# Patient Record
Sex: FEMALE | Race: WHITE | Employment: OTHER | URBAN - METROPOLITAN AREA
[De-identification: names, ages, dates, MRNs, and addresses within clinical notes are randomized per-mention and may not be internally consistent; named-entity substitution may affect disease eponyms.]

---

## 2020-01-06 ENCOUNTER — OFFICE VISIT (OUTPATIENT)
Dept: ORTHOPEDIC SURGERY | Age: 80
End: 2020-01-06
Payer: MEDICARE

## 2020-01-06 VITALS — WEIGHT: 134.04 LBS | HEIGHT: 62 IN | BODY MASS INDEX: 24.67 KG/M2

## 2020-01-06 PROCEDURE — G8400 PT W/DXA NO RESULTS DOC: HCPCS | Performed by: ORTHOPAEDIC SURGERY

## 2020-01-06 PROCEDURE — 4040F PNEUMOC VAC/ADMIN/RCVD: CPT | Performed by: ORTHOPAEDIC SURGERY

## 2020-01-06 PROCEDURE — 1090F PRES/ABSN URINE INCON ASSESS: CPT | Performed by: ORTHOPAEDIC SURGERY

## 2020-01-06 PROCEDURE — G8427 DOCREV CUR MEDS BY ELIG CLIN: HCPCS | Performed by: ORTHOPAEDIC SURGERY

## 2020-01-06 PROCEDURE — 99203 OFFICE O/P NEW LOW 30 MIN: CPT | Performed by: ORTHOPAEDIC SURGERY

## 2020-01-06 PROCEDURE — G8484 FLU IMMUNIZE NO ADMIN: HCPCS | Performed by: ORTHOPAEDIC SURGERY

## 2020-01-06 PROCEDURE — 1123F ACP DISCUSS/DSCN MKR DOCD: CPT | Performed by: ORTHOPAEDIC SURGERY

## 2020-01-06 PROCEDURE — 1036F TOBACCO NON-USER: CPT | Performed by: ORTHOPAEDIC SURGERY

## 2020-01-06 PROCEDURE — G8420 CALC BMI NORM PARAMETERS: HCPCS | Performed by: ORTHOPAEDIC SURGERY

## 2020-01-14 ENCOUNTER — HOSPITAL ENCOUNTER (OUTPATIENT)
Dept: PHYSICAL THERAPY | Age: 80
Setting detail: THERAPIES SERIES
Discharge: HOME | End: 2020-01-14
Payer: MEDICARE

## 2020-01-14 PROCEDURE — 97112 NEUROMUSCULAR REEDUCATION: CPT | Performed by: SPECIALIST

## 2020-01-14 PROCEDURE — 97161 PT EVAL LOW COMPLEX 20 MIN: CPT | Performed by: SPECIALIST

## 2020-01-14 PROCEDURE — G0283 ELEC STIM OTHER THAN WOUND: HCPCS | Performed by: SPECIALIST

## 2020-01-14 PROCEDURE — 97110 THERAPEUTIC EXERCISES: CPT | Performed by: SPECIALIST
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
## 2020-01-14 NOTE — FLOWSHEET NOTE
[address] [facility], [address] [place] [address] [address]  Phone: [phone]   Fax:     [phone]      Physical Therapy Treatment Note/ Progress Report:     Date:  2020    Patient Name:  Valencia Hernandez    :  1940  MRN: [id number]  Restrictions/Precautions:    Medical/Treatment Diagnosis Information:  ·   M25.511 (ICD-10-CM) - Right shoulder pain, unspecified chronicity  ·    Insurance/Certification information:   [facility]  Physician Information:   Dr Jose Moncada  Has the plan of care been signed (Y/N):        []  Yes  [x]  No     Date of Patient follow up with Physician: NS      Is this a Progress Report:     []  Yes  [x]  No        If Yes:  Date Range for reporting period:  Beginning 20  Ending20    Progress report will be due (10 Rx or 30 days whichever is less):        Recertification will be due (POC Duration  / 90 days whichever is less): 20       Visit # Insurance Allowable Auth Required   1 [facility] $35 []  Yes [x]  No        Functional Scale: Quick Dash 70%    Date assessed:  20      Latex Allergy:  [x]NO      []YES  Preferred Language for Healthcare:   [x]English       []other:      Pain level:  4-10/10     SUBJECTIVE:  See eval    OBJECTIVE: See eval   Observation:    Test measurements:      RESTRICTIONS/PRECAUTIONS: none    Exercises/Interventions:   Therapeutic Ex (66664) Sets/sec Reps Notes/CUES HEP   PS  5x  x   Chin tucks  5x  x   PB rows OR 10x  x   SG  10x  x   Table slides flex/ER  5x  x                                                    Manual Intervention (25872)       Gentle ROM  NV                                        NMR re-education (68856)   CUES NEEDED                                                                   Therapeutic Activity (26080)                                          ESU/CP  10 min         Therapeutic Exercise and NMR EXR  [x] (77436) Provided verbal/tactile cueing for activities related to strengthening, flexibility, endurance, ROM  for improvements in scapular, scapulothoracic and UE control with self care, reaching, carrying, lifting, house/yardwork, driving/computer work.    [] (47601) Provided verbal/tactile cueing for activities related to improving balance, coordination, kinesthetic sense, posture, motor skill, proprioception  to assist with  scapular, scapulothoracic and UE control with self care, reaching, carrying, lifting, house/yardwork, driving/computer work. Therapeutic Activities:    [x] (50355 or 31835) Provided verbal/tactile cueing for activities related to improving balance, coordination, kinesthetic sense, posture, motor skill, proprioception and motor activation to allow for proper function of scapular, scapulothoracic and UE control with self care, carrying, lifting, driving/computer work.      Home Exercise Program:    [x] (06290) Reviewed/Progressed HEP activities related to strengthening, flexibility, endurance, ROM of scapular, scapulothoracic and UE control with self care, reaching, carrying, lifting, house/yardwork, driving/computer work  [] (13698) Reviewed/Progressed HEP activities related to improving balance, coordination, kinesthetic sense, posture, motor skill, proprioception of scapular, scapulothoracic and UE control with self care, reaching, carrying, lifting, house/yardwork, driving/computer work      Manual Treatments:  PROM / STM / Oscillations-Mobs:  G-I, II, III, IV (PA's, Inf., Post.)  [x] (03039) Provided manual therapy to mobilize soft tissue/joints of cervical/CT, scapular GHJ and UE for the purpose of modulating pain, promoting relaxation,  increasing ROM, reducing/eliminating soft tissue swelling/inflammation/restriction, improving soft tissue extensibility and allowing for proper ROM for normal function with self care, reaching, carrying, lifting, house/yardwork, driving/computer work    Modalities:     [x] GAME READY (VASO)- for significant edema, swelling, pain control. Charges:  Timed Code Treatment Minutes: 25   Total Treatment Minutes: 55      [x] EVAL (LOW) 76636 (typically 20 minutes face-to-face)  [] EVAL (MOD) 35857 (typically 30 minutes face-to-face)  [] EVAL (HIGH) 38703 (typically 45 minutes face-to-face)  [] RE-EVAL     [x] RT(58698) x   1  [] IONTO  [x] NMR (42494) x  1   [] VASO  [] Manual (19795) x     [] Other:  [] TA x      [] Mech Traction (38782)  [] ES(attended) (21076)      [x] ES (un) (38906):    ASSESSMENT:  See eval      GOALS:      Patient stated goal: Reach behind back    Therapist goals for Patient:   Short Term Goals: To be achieved in: 2 weeks  1. Independent in HEP and progression per patient tolerance, in order to prevent re-injury. [x] Progressing: [] Met: [] Not Met: [] Adjusted     2. Patient will have a decrease in pain to facilitate improvement in movement, function, and ADLs as indicated by Functional Deficits. [x] Progressing: [] Met: [] Not Met: [] Adjusted     Long Term Goals: To be achieved in: 4 weeks  1. Disability index score of 35% or less for the DASH to assist with reaching prior level of function. [x] Progressing: [] Met: [] Not Met: [] Adjusted     2. Patient will demonstrate increased AROM to 150 to allow for proper joint functioning as indicated by patients Functional Deficits. [x] Progressing: [] Met: [] Not Met: [] Adjusted     3. Patient will demonstrate an increase in Strength to 4/5 to allow for proper functional mobility as indicated by patients Functional Deficits. [x] Progressing: [] Met: [] Not Met: [] Adjusted     4. Patient will return to [facility] for functional activities without increased symptoms or restriction. [x] Progressing: [] Met: [] Not Met: [] Adjusted     5.  Reach behind back with min to no limitations(patient specific functional goal)    [x] Progressing: [] Met: [] Not Met: [] Adjusted            Overall Progression Towards Functional goals/ Treatment Progress Update:  [] Patient is progressing as expected towards functional goals listed. [] Progression is slowed due to complexities/Impairments listed. [] Progression has been slowed due to co-morbidities. [x] Plan just implemented, too soon to assess goals progression <30days   [] Goals require adjustment due to lack of progress  [] Patient is not progressing as expected and requires additional follow up with physician  [] Other    Prognosis for POC: [x] Good [] Fair  [] Poor      Patient requires continued skilled intervention: [x] Yes  [] No    Treatment/Activity Tolerance:  [x] Patient able to complete treatment  [] Patient limited by fatigue  [] Patient limited by pain    [] Patient limited by other medical complications  [] Other:       PLAN: See eval  [] Continue per plan of care [] Alter current plan (see comments above)  [x] Plan of care initiated [] Hold pending MD visit [] Discharge    Electronically signed by:  Mag Ceron PT    Note: If patient does not return for scheduled/ recommended follow up visits, this note will serve as a discharge from care along with most recent update on progress.

## 2020-02-04 ENCOUNTER — OFFICE VISIT (OUTPATIENT)
Dept: ORTHOPEDIC SURGERY | Age: 80
End: 2020-02-04
Payer: MEDICARE

## 2020-02-04 VITALS — WEIGHT: 134.04 LBS | HEIGHT: 62 IN | BODY MASS INDEX: 24.67 KG/M2

## 2020-02-04 PROCEDURE — 4040F PNEUMOC VAC/ADMIN/RCVD: CPT | Performed by: ORTHOPAEDIC SURGERY

## 2020-02-04 PROCEDURE — G8400 PT W/DXA NO RESULTS DOC: HCPCS | Performed by: ORTHOPAEDIC SURGERY

## 2020-02-04 PROCEDURE — G8420 CALC BMI NORM PARAMETERS: HCPCS | Performed by: ORTHOPAEDIC SURGERY

## 2020-02-04 PROCEDURE — G8427 DOCREV CUR MEDS BY ELIG CLIN: HCPCS | Performed by: ORTHOPAEDIC SURGERY

## 2020-02-04 PROCEDURE — 1036F TOBACCO NON-USER: CPT | Performed by: ORTHOPAEDIC SURGERY

## 2020-02-04 PROCEDURE — 1090F PRES/ABSN URINE INCON ASSESS: CPT | Performed by: ORTHOPAEDIC SURGERY

## 2020-02-04 PROCEDURE — 99213 OFFICE O/P EST LOW 20 MIN: CPT | Performed by: ORTHOPAEDIC SURGERY

## 2020-02-04 PROCEDURE — G8484 FLU IMMUNIZE NO ADMIN: HCPCS | Performed by: ORTHOPAEDIC SURGERY

## 2020-02-04 PROCEDURE — 1123F ACP DISCUSS/DSCN MKR DOCD: CPT | Performed by: ORTHOPAEDIC SURGERY

## 2020-02-04 NOTE — PROGRESS NOTES
organization: Not on file     Attends meetings of clubs or organizations: Not on file     Relationship status: Not on file    Intimate partner violence:     Fear of current or ex partner: Not on file     Emotionally abused: Not on file     Physically abused: Not on file     Forced sexual activity: Not on file   Other Topics Concern    Not on file   Social History Narrative    Not on file       No family history on file. Review of Systems  Pertinent items are noted in HPI  Review of systems reviewed from Patient History Form dated on 1/6/2020 and available in the patient's chart under the Media tab. Vital Signs  There were no vitals filed for this visit. General Exam:   Constitutional: Patient is adequately groomed with no evidence of malnutrition  Mental Status: The patient is oriented to time, place and person. The patient's mood and affect are appropriate. Lymphatic: The lymphatic examination bilaterally reveals all areas to be without enlargement or induration. Neurological: The patient has good coordination. There is no weakness or sensory deficit. Gait: Normal    Right shoulder examination  Inspection: No bruising or atrophy    Palpation: Numbness over biceps, minimal tenderness over AC joint, tenderness anterolateral aspect of the shoulder    Range of Motion: Forward elevation 160, external rotation 40, internal rotation to L3    Sensation: In tact to light touch all nerve distributions     Strength: 4+/5 supraspinatus, 4+/5 infraspinatus, negative belly press    Special Tests: Positive Mayen, positive speeds, positive Wilkes's, positive Neer    Skin: There are no additional worrisome rashes, ulcerations or lesions. Circulation normal    Additional Examinations:  Left Upper Extremity: Examination of the left upper extremity does not show any tenderness, deformity or injury. Range of motion is unremarkable. There is no gross instability.   There are no rashes, ulcerations or and if there are issues interim she will contact the office    Frandy Covarrubias MD  [address] [facility] partner of [facility] ([facility])        Voice Recognition Dictation disclaimer: Please note that portions of this chart were generated using Dragon dictation software. Although every effort was made to ensure the accuracy of this automated transcription, some errors in transcription may have occurred.

## 2020-02-10 ENCOUNTER — HOSPITAL ENCOUNTER (OUTPATIENT)
Dept: MRI IMAGING | Age: 80
Discharge: HOME | End: 2020-02-10
Payer: MEDICARE

## 2020-02-10 PROCEDURE — 73221 MRI JOINT UPR EXTREM W/O DYE: CPT

## 2020-02-14 ENCOUNTER — OFFICE VISIT (OUTPATIENT)
Dept: ORTHOPEDIC SURGERY | Age: 80
End: 2020-02-14
Payer: MEDICARE

## 2020-02-14 VITALS — WEIGHT: 134.04 LBS | HEIGHT: 62 IN | BODY MASS INDEX: 24.67 KG/M2

## 2020-02-14 PROCEDURE — G8420 CALC BMI NORM PARAMETERS: HCPCS | Performed by: ORTHOPAEDIC SURGERY

## 2020-02-14 PROCEDURE — 99213 OFFICE O/P EST LOW 20 MIN: CPT | Performed by: ORTHOPAEDIC SURGERY

## 2020-02-14 PROCEDURE — 4040F PNEUMOC VAC/ADMIN/RCVD: CPT | Performed by: ORTHOPAEDIC SURGERY

## 2020-02-14 PROCEDURE — L3670 SO ACRO/CLAV CAN WEB PRE OTS: HCPCS | Performed by: ORTHOPAEDIC SURGERY

## 2020-02-14 PROCEDURE — G8400 PT W/DXA NO RESULTS DOC: HCPCS | Performed by: ORTHOPAEDIC SURGERY

## 2020-02-14 PROCEDURE — G8427 DOCREV CUR MEDS BY ELIG CLIN: HCPCS | Performed by: ORTHOPAEDIC SURGERY

## 2020-02-14 PROCEDURE — 1090F PRES/ABSN URINE INCON ASSESS: CPT | Performed by: ORTHOPAEDIC SURGERY

## 2020-02-14 PROCEDURE — 1123F ACP DISCUSS/DSCN MKR DOCD: CPT | Performed by: ORTHOPAEDIC SURGERY

## 2020-02-14 PROCEDURE — 1036F TOBACCO NON-USER: CPT | Performed by: ORTHOPAEDIC SURGERY

## 2020-02-14 PROCEDURE — G8484 FLU IMMUNIZE NO ADMIN: HCPCS | Performed by: ORTHOPAEDIC SURGERY

## 2020-02-14 NOTE — PROGRESS NOTES
Chief Complaint  Follow-up (Right Shoulder: MRI review )      History of Present Illness:  Candelaria Bryant is a 67 y.o. y/o female who presents today for follow up of her right shoulder. She is here today to review her MRI. She has done physical therapy and had 2 prior corticosteroid injections in her shoulder and continues having pain. No new falls or acute trauma. Today she is also interested in having a small skin lesion evaluated in the anterior aspect of her shoulder.       Medical History  Past Medical History:   Diagnosis Date    Cancer [facility])     CVA (cerebral vascular accident) ([facility] [address]     Hyperlipidemia     Hypertension     Lyme disease        Past Surgical History:   Procedure Laterality Date    APPENDECTOMY      BACK SURGERY         Social History     Socioeconomic History    Marital status:      Spouse name: Not on file    Number of children: Not on file    Years of education: Not on file    Highest education level: Not on file   Occupational History    Not on file   Social Needs    Financial resource strain: Not on file    Food insecurity:     Worry: Not on file     Inability: Not on file    Transportation needs:     Medical: Not on file     Non-medical: Not on file   Tobacco Use    Smoking status: Former Smoker     Packs/day: 1.50     Types: Cigarettes    Smokeless tobacco: Never Used   Substance and Sexual Activity    Alcohol use: Yes     Comment: occ    Drug use: Yes     Types: Marijuana    Sexual activity: Not on file   Lifestyle    Physical activity:     Days per week: Not on file     Minutes per session: Not on file    Stress: Not on file   Relationships    Social connections:     Talks on phone: Not on file     Gets together: Not on file     Attends Denominational service: Not on file     Active member of club or organization: Not on file     Attends meetings of clubs or organizations: Not on file     Relationship status: Not on file    Intimate partner violence:     Fear of GLENOHUMERAL JOINT: No sizable glenohumeral joint effusion.  Inferior   glenohumeral ligament appears intact.  Mild glenohumeral chondromalacia.       AC JOINT AND ACROMIOCLAVICULAR ARCH: Mild degenerative changes of the right   acromioclavicular joint.  Type 2 acromion.       BONE MARROW: Subcortical cystic changes at the lateral humeral head.  Bone   marrow signal intensity within the remaining visualized osseous structures is   grossly unremarkable.       OUTLET SPACES: Subscapularis and quadrilateral space grossly unremarkable in   appearance.       No right axillary lymphadenopathy.           Impression   1. Less than 50% partial-thickness articular surface tearing of posterior   supraspinatus between critical zone and footplate. 2. Moderate underlying supraspinatus and infraspinatus tendinopathy. 3. Low-grade partial-thickness interstitial tearing of the insertional fibers   of subscapularis. 4. Mild labral degeneration. 5. Mild glenohumeral chondromalacia. 6. Mild tendinosis of the intra-articular long head of biceps tendon. 7. Mild degenerative changes of the right AC joint. Assessment:  80-year-old female with a right shoulder partial-thickness rotator cuff tear, longitudinal biceps tendon tear and tendinosis, labral degeneration, subacromial impingement, skin lesion anterior aspect right shoulder    Office Procedures:  Orders Placed This Encounter   Procedures    Sling Shot II Breg Brace     Patient was prescribed a Breg Sling Shot II Shoulder Brace. The right shoulder will require stabilization / immobilization from this orthosis. The orthosis will assist in protecting the affected area, provide functional support and facilitate healing. The device was ordered and fit on 2/14/2020. The patient was educated and fit by a healthcare professional with expert knowledge and specialization in brace application while under the direct supervision of the treating physician.   Verbal and written instructions for the use of and application of this item were provided. They were instructed to contact the office immediately should the brace result in increased pain, decreased sensation, increased swelling or worsening of the condition. Plan:   -We discussed her condition at length. At this point she has done physical therapy as well as had 2 subacromial cortisone injections for her partial-thickness rotator cuff tear. She continues having significant pain which is limiting her activities and causing significant disability. Given this information she does wish to move forward with surgical treatment. We did discuss the postoperative restrictions as well as risks and rehab expectations and she wishes to move forward.  -We discussed that the small skin lesion which is been present for over 5 years anterior superior shoulder is likely going to be in the surgical field near her anterior portal.  Given that it does catch and cause some irritation we could proceed with a excision of the skin lesion as well     Surgery: Right shoulder arthroscopy with debridement, rotator cuff repair with Regeneten, subacromial decompression, biceps tenodesis, excision skin lesion    After discussing the pros and cons of treatment options and risks and benefits of surgical intervention, Litzy Alas  has elected to proceed with surgery at this time. She understands that there are no guarantee of results with surgery and there are always risks of infection, stiff joint, injury to nerve or blood vessel, blood clot, anesthesia complications, etc. The procedure and recovery were discussed and all questions answered. Litzy Alas understands that this is an elective procedure and should they have any further questions they may give me a call. Surgery will be set up in the near future.     Risks and benefits of the procedure were fully explained in detail, including but not limited to infection, neurovascular injury, continued pain, arthritis, stiffness, need for further surgery, re-injury, DVT, PE, general risks of anesthesia and loss of limb or life. The patient understands all the risks and does wish to proceed with written consent.     -We will provide her with a sling today which she will wear after surgery  -She will get a preoperative medical evaluation prior to surgery  -We will see her back for surgery in the near future and if there are issues interim she will contact the office    MD Juan Arredondo 59 partner of [facility])      Voice Recognition Dictation disclaimer: Please note that portions of this chart were generated using Dragon dictation software. Although every effort was made to ensure the accuracy of this automated transcription, some errors in transcription may have occurred.

## 2020-02-17 ENCOUNTER — TELEPHONE (OUTPATIENT)
Dept: ORTHOPEDIC SURGERY | Age: 80
End: 2020-02-17

## 2020-02-17 NOTE — TELEPHONE ENCOUNTER
Auth: NPR  Date: 02/26/20  Reference #[id number]  Spoke with: NONE  Type of SX: OUTPATIENT  Location: [facility]  CPT [id number], [id number], [id number], [id number], [id number]   SX area: [facility]

## 2020-02-24 ENCOUNTER — ANESTHESIA EVENT (OUTPATIENT)
Dept: OPERATING ROOM | Age: 80
End: 2020-02-24
Payer: MEDICARE

## 2020-02-26 ENCOUNTER — HOSPITAL ENCOUNTER (OUTPATIENT)
Age: 80
Setting detail: OUTPATIENT SURGERY
Discharge: HOME | End: 2020-02-26
Attending: ORTHOPAEDIC SURGERY | Admitting: ORTHOPAEDIC SURGERY
Payer: MEDICARE

## 2020-02-26 ENCOUNTER — ANESTHESIA (OUTPATIENT)
Dept: OPERATING ROOM | Age: 80
End: 2020-02-26
Payer: MEDICARE

## 2020-02-26 VITALS
RESPIRATION RATE: 5 BRPM | SYSTOLIC BLOOD PRESSURE: 160 MMHG | DIASTOLIC BLOOD PRESSURE: 72 MMHG | TEMPERATURE: 96.8 F | OXYGEN SATURATION: 90 %

## 2020-02-26 VITALS
SYSTOLIC BLOOD PRESSURE: 184 MMHG | DIASTOLIC BLOOD PRESSURE: 87 MMHG | HEIGHT: 62 IN | RESPIRATION RATE: 16 BRPM | BODY MASS INDEX: 24.66 KG/M2 | HEART RATE: 68 BPM | OXYGEN SATURATION: 92 % | WEIGHT: 134 LBS | TEMPERATURE: 97.1 F

## 2020-02-26 PROCEDURE — 2500000003 HC RX 250 WO HCPCS: Performed by: ANESTHESIOLOGY

## 2020-02-26 PROCEDURE — 7100000010 HC PHASE II RECOVERY - FIRST 15 MIN: Performed by: ORTHOPAEDIC SURGERY

## 2020-02-26 PROCEDURE — 6360000002 HC RX W HCPCS: Performed by: ANESTHESIOLOGY

## 2020-02-26 PROCEDURE — 2580000003 HC RX 258: Performed by: ANESTHESIOLOGY

## 2020-02-26 PROCEDURE — 7100000011 HC PHASE II RECOVERY - ADDTL 15 MIN: Performed by: ORTHOPAEDIC SURGERY

## 2020-02-26 PROCEDURE — 6360000002 HC RX W HCPCS: Performed by: ORTHOPAEDIC SURGERY

## 2020-02-26 PROCEDURE — 7100000001 HC PACU RECOVERY - ADDTL 15 MIN: Performed by: ORTHOPAEDIC SURGERY

## 2020-02-26 PROCEDURE — 2500000003 HC RX 250 WO HCPCS: Performed by: NURSE ANESTHETIST, CERTIFIED REGISTERED

## 2020-02-26 PROCEDURE — 7100000000 HC PACU RECOVERY - FIRST 15 MIN: Performed by: ORTHOPAEDIC SURGERY

## 2020-02-26 PROCEDURE — 3700000000 HC ANESTHESIA ATTENDED CARE: Performed by: ORTHOPAEDIC SURGERY

## 2020-02-26 PROCEDURE — 88304 TISSUE EXAM BY PATHOLOGIST: CPT

## 2020-02-26 PROCEDURE — 6360000002 HC RX W HCPCS

## 2020-02-26 PROCEDURE — 3600000004 HC SURGERY LEVEL 4 BASE: Performed by: ORTHOPAEDIC SURGERY

## 2020-02-26 PROCEDURE — 64415 NJX AA&/STRD BRCH PLXS IMG: CPT | Performed by: ANESTHESIOLOGY

## 2020-02-26 PROCEDURE — 2720000010 HC SURG SUPPLY STERILE: Performed by: ORTHOPAEDIC SURGERY

## 2020-02-26 PROCEDURE — 3600000014 HC SURGERY LEVEL 4 ADDTL 15MIN: Performed by: ORTHOPAEDIC SURGERY

## 2020-02-26 PROCEDURE — 2709999900 HC NON-CHARGEABLE SUPPLY: Performed by: ORTHOPAEDIC SURGERY

## 2020-02-26 PROCEDURE — C1713 ANCHOR/SCREW BN/BN,TIS/BN: HCPCS | Performed by: ORTHOPAEDIC SURGERY

## 2020-02-26 PROCEDURE — 88341 IMHCHEM/IMCYTCHM EA ADD ANTB: CPT

## 2020-02-26 PROCEDURE — 2580000003 HC RX 258: Performed by: ORTHOPAEDIC SURGERY

## 2020-02-26 PROCEDURE — 2500000003 HC RX 250 WO HCPCS

## 2020-02-26 PROCEDURE — 88342 IMHCHEM/IMCYTCHM 1ST ANTB: CPT

## 2020-02-26 PROCEDURE — 6360000002 HC RX W HCPCS: Performed by: NURSE ANESTHETIST, CERTIFIED REGISTERED

## 2020-02-26 PROCEDURE — 3700000001 HC ADD 15 MINUTES (ANESTHESIA): Performed by: ORTHOPAEDIC SURGERY

## 2020-02-26 PROCEDURE — 2780000010 HC IMPLANT OTHER: Performed by: ORTHOPAEDIC SURGERY

## 2020-02-26 DEVICE — BONE ANCHORS 3 WITH ARTHROSCOPIC DELIVERY SYSTEM ADVANCED
Type: IMPLANTABLE DEVICE | Status: FUNCTIONAL
Brand: BONE ANCHORS WITH ARTHROSCOPIC DELIVERY SYSTEM - ADVANCED

## 2020-02-26 DEVICE — Z INACTIVE USE 2600835 ANCHOR TEND 8 FOR REGENETEN BIOINDUCTIVE IMPL SYS: Type: IMPLANTABLE DEVICE | Status: FUNCTIONAL

## 2020-02-26 DEVICE — IMPLANTABLE DEVICE
Type: IMPLANTABLE DEVICE | Status: FUNCTIONAL
Brand: BIOINDUCTIVE IMPLANT WITH ARTHROSCOPIC DELIVERY SYSTEM - MEDIUM

## 2020-02-26 RX ORDER — DOCUSATE SODIUM 100 MG/1
100 CAPSULE, LIQUID FILLED ORAL 2 TIMES DAILY PRN
Qty: 20 CAPSULE | Refills: 0 | Status: SHIPPED | OUTPATIENT
Start: 2020-02-26 | End: 2020-03-27

## 2020-02-26 RX ORDER — BUPIVACAINE HYDROCHLORIDE 5 MG/ML
INJECTION, SOLUTION EPIDURAL; INTRACAUDAL
Status: COMPLETED
Start: 2020-02-26 | End: 2020-02-26

## 2020-02-26 RX ORDER — PROPOFOL 10 MG/ML
INJECTION, EMULSION INTRAVENOUS PRN
Status: DISCONTINUED | OUTPATIENT
Start: 2020-02-26 | End: 2020-02-26 | Stop reason: SDUPTHER

## 2020-02-26 RX ORDER — ACETAMINOPHEN 10 MG/ML
1000 INJECTION, SOLUTION INTRAVENOUS ONCE
Status: COMPLETED | OUTPATIENT
Start: 2020-02-26 | End: 2020-02-26

## 2020-02-26 RX ORDER — FENTANYL CITRATE 50 UG/ML
INJECTION, SOLUTION INTRAMUSCULAR; INTRAVENOUS
Status: COMPLETED
Start: 2020-02-26 | End: 2020-02-26

## 2020-02-26 RX ORDER — EPHEDRINE SULFATE 50 MG/ML
INJECTION INTRAVENOUS PRN
Status: DISCONTINUED | OUTPATIENT
Start: 2020-02-26 | End: 2020-02-26 | Stop reason: SDUPTHER

## 2020-02-26 RX ORDER — OXYCODONE HYDROCHLORIDE AND ACETAMINOPHEN 5; 325 MG/1; MG/1
1 TABLET ORAL EVERY 6 HOURS PRN
Qty: 28 TABLET | Refills: 0 | Status: SHIPPED | OUTPATIENT
Start: 2020-02-26 | End: 2020-03-04

## 2020-02-26 RX ORDER — DEXAMETHASONE SODIUM PHOSPHATE 4 MG/ML
INJECTION, SOLUTION INTRA-ARTICULAR; INTRALESIONAL; INTRAMUSCULAR; INTRAVENOUS; SOFT TISSUE PRN
Status: DISCONTINUED | OUTPATIENT
Start: 2020-02-26 | End: 2020-02-26 | Stop reason: SDUPTHER

## 2020-02-26 RX ORDER — BUPIVACAINE HYDROCHLORIDE 5 MG/ML
INJECTION, SOLUTION EPIDURAL; INTRACAUDAL
Status: DISCONTINUED
Start: 2020-02-26 | End: 2020-02-26 | Stop reason: HOSPADM

## 2020-02-26 RX ORDER — ACETAMINOPHEN 10 MG/ML
INJECTION, SOLUTION INTRAVENOUS
Status: COMPLETED
Start: 2020-02-26 | End: 2020-02-26

## 2020-02-26 RX ORDER — BUPIVACAINE HYDROCHLORIDE 5 MG/ML
INJECTION, SOLUTION EPIDURAL; INTRACAUDAL PRN
Status: DISCONTINUED | OUTPATIENT
Start: 2020-02-26 | End: 2020-02-26 | Stop reason: SDUPTHER

## 2020-02-26 RX ORDER — SODIUM CHLORIDE 0.9 % (FLUSH) 0.9 %
10 SYRINGE (ML) INJECTION PRN
Status: DISCONTINUED | OUTPATIENT
Start: 2020-02-26 | End: 2020-02-26 | Stop reason: HOSPADM

## 2020-02-26 RX ORDER — SODIUM CHLORIDE 0.9 % (FLUSH) 0.9 %
10 SYRINGE (ML) INJECTION EVERY 12 HOURS SCHEDULED
Status: DISCONTINUED | OUTPATIENT
Start: 2020-02-26 | End: 2020-02-26 | Stop reason: HOSPADM

## 2020-02-26 RX ORDER — ROCURONIUM BROMIDE 10 MG/ML
INJECTION, SOLUTION INTRAVENOUS PRN
Status: DISCONTINUED | OUTPATIENT
Start: 2020-02-26 | End: 2020-02-26 | Stop reason: SDUPTHER

## 2020-02-26 RX ORDER — SODIUM CHLORIDE, SODIUM LACTATE, POTASSIUM CHLORIDE, CALCIUM CHLORIDE 600; 310; 30; 20 MG/100ML; MG/100ML; MG/100ML; MG/100ML
INJECTION, SOLUTION INTRAVENOUS CONTINUOUS
Status: DISCONTINUED | OUTPATIENT
Start: 2020-02-26 | End: 2020-02-26 | Stop reason: HOSPADM

## 2020-02-26 RX ORDER — GLYCOPYRROLATE 0.2 MG/ML
INJECTION INTRAMUSCULAR; INTRAVENOUS PRN
Status: DISCONTINUED | OUTPATIENT
Start: 2020-02-26 | End: 2020-02-26 | Stop reason: SDUPTHER

## 2020-02-26 RX ORDER — LIDOCAINE HYDROCHLORIDE 10 MG/ML
INJECTION, SOLUTION EPIDURAL; INFILTRATION; INTRACAUDAL; PERINEURAL
Status: COMPLETED
Start: 2020-02-26 | End: 2020-02-26

## 2020-02-26 RX ORDER — ONDANSETRON 4 MG/1
4 TABLET, FILM COATED ORAL EVERY 4 HOURS PRN
Qty: 20 TABLET | Refills: 0 | Status: SHIPPED | OUTPATIENT
Start: 2020-02-26

## 2020-02-26 RX ORDER — ONDANSETRON 2 MG/ML
INJECTION INTRAMUSCULAR; INTRAVENOUS PRN
Status: DISCONTINUED | OUTPATIENT
Start: 2020-02-26 | End: 2020-02-26 | Stop reason: SDUPTHER

## 2020-02-26 RX ORDER — FENTANYL CITRATE 50 UG/ML
INJECTION, SOLUTION INTRAMUSCULAR; INTRAVENOUS PRN
Status: DISCONTINUED | OUTPATIENT
Start: 2020-02-26 | End: 2020-02-26 | Stop reason: SDUPTHER

## 2020-02-26 RX ADMIN — ACETAMINOPHEN 1000 MG: 10 INJECTION, SOLUTION INTRAVENOUS at 10:45

## 2020-02-26 RX ADMIN — ONDANSETRON 4 MG: 2 INJECTION, SOLUTION INTRAMUSCULAR; INTRAVENOUS at 13:44

## 2020-02-26 RX ADMIN — SUGAMMADEX 200 MG: 100 INJECTION, SOLUTION INTRAVENOUS at 13:47

## 2020-02-26 RX ADMIN — ROCURONIUM BROMIDE 50 MG: 10 INJECTION INTRAVENOUS at 12:21

## 2020-02-26 RX ADMIN — GLYCOPYRROLATE 0.2 MG: 0.2 INJECTION, SOLUTION INTRAMUSCULAR; INTRAVENOUS at 13:03

## 2020-02-26 RX ADMIN — BUPIVACAINE HYDROCHLORIDE 30 ML: 5 INJECTION, SOLUTION EPIDURAL; INTRACAUDAL at 11:40

## 2020-02-26 RX ADMIN — SODIUM CHLORIDE, POTASSIUM CHLORIDE, SODIUM LACTATE AND CALCIUM CHLORIDE: 600; 310; 30; 20 INJECTION, SOLUTION INTRAVENOUS at 13:24

## 2020-02-26 RX ADMIN — DEXAMETHASONE SODIUM PHOSPHATE 4 MG: 4 INJECTION, SOLUTION INTRAMUSCULAR; INTRAVENOUS at 13:13

## 2020-02-26 RX ADMIN — EPHEDRINE SULFATE 20 MG: 50 INJECTION INTRAVENOUS at 13:05

## 2020-02-26 RX ADMIN — SODIUM CHLORIDE, POTASSIUM CHLORIDE, SODIUM LACTATE AND CALCIUM CHLORIDE: 600; 310; 30; 20 INJECTION, SOLUTION INTRAVENOUS at 10:44

## 2020-02-26 RX ADMIN — PHENYLEPHRINE HYDROCHLORIDE 100 MCG: 10 INJECTION INTRAVENOUS at 13:06

## 2020-02-26 RX ADMIN — LIDOCAINE HYDROCHLORIDE 0.1 ML: 10 INJECTION, SOLUTION EPIDURAL; INFILTRATION; INTRACAUDAL; PERINEURAL at 10:45

## 2020-02-26 RX ADMIN — EPHEDRINE SULFATE 10 MG: 50 INJECTION INTRAVENOUS at 12:58

## 2020-02-26 RX ADMIN — PROPOFOL 120 MG: 10 INJECTION, EMULSION INTRAVENOUS at 12:21

## 2020-02-26 RX ADMIN — Medication 2 G: at 12:28

## 2020-02-26 RX ADMIN — FENTANYL CITRATE 100 MCG: 50 INJECTION INTRAMUSCULAR; INTRAVENOUS at 11:37

## 2020-02-26 ASSESSMENT — PULMONARY FUNCTION TESTS
PIF_VALUE: 21
PIF_VALUE: 25
PIF_VALUE: 22
PIF_VALUE: 19
PIF_VALUE: 17
PIF_VALUE: 10
PIF_VALUE: 22
PIF_VALUE: 22
PIF_VALUE: 17
PIF_VALUE: 20
PIF_VALUE: 20
PIF_VALUE: 1
PIF_VALUE: 1
PIF_VALUE: 17
PIF_VALUE: 1
PIF_VALUE: 17
PIF_VALUE: 22
PIF_VALUE: 24
PIF_VALUE: 18
PIF_VALUE: 17
PIF_VALUE: 19
PIF_VALUE: 23
PIF_VALUE: 14
PIF_VALUE: 1
PIF_VALUE: 20
PIF_VALUE: 23
PIF_VALUE: 27
PIF_VALUE: 19
PIF_VALUE: 21
PIF_VALUE: 20
PIF_VALUE: 21
PIF_VALUE: 19
PIF_VALUE: 20
PIF_VALUE: 19
PIF_VALUE: 20
PIF_VALUE: 23
PIF_VALUE: 17
PIF_VALUE: 15
PIF_VALUE: 20
PIF_VALUE: 22
PIF_VALUE: 19
PIF_VALUE: 19
PIF_VALUE: 6
PIF_VALUE: 24
PIF_VALUE: 19
PIF_VALUE: 19
PIF_VALUE: 22
PIF_VALUE: 17
PIF_VALUE: 20
PIF_VALUE: 17
PIF_VALUE: 20
PIF_VALUE: 13
PIF_VALUE: 2
PIF_VALUE: 26
PIF_VALUE: 17
PIF_VALUE: 24
PIF_VALUE: 20
PIF_VALUE: 20
PIF_VALUE: 29
PIF_VALUE: 19
PIF_VALUE: 25
PIF_VALUE: 25
PIF_VALUE: 22
PIF_VALUE: 19
PIF_VALUE: 23
PIF_VALUE: 20
PIF_VALUE: 23
PIF_VALUE: 22
PIF_VALUE: 28
PIF_VALUE: 21
PIF_VALUE: 17
PIF_VALUE: 20
PIF_VALUE: 22
PIF_VALUE: 22
PIF_VALUE: 19
PIF_VALUE: 20
PIF_VALUE: 26
PIF_VALUE: 17
PIF_VALUE: 6
PIF_VALUE: 19
PIF_VALUE: 1
PIF_VALUE: 17
PIF_VALUE: 17
PIF_VALUE: 19
PIF_VALUE: 20
PIF_VALUE: 19
PIF_VALUE: 1
PIF_VALUE: 20
PIF_VALUE: 19
PIF_VALUE: 29
PIF_VALUE: 19

## 2020-02-26 ASSESSMENT — PAIN DESCRIPTION - DESCRIPTORS: DESCRIPTORS: ACHING

## 2020-02-26 ASSESSMENT — PAIN - FUNCTIONAL ASSESSMENT
PAIN_FUNCTIONAL_ASSESSMENT: 0-10
PAIN_FUNCTIONAL_ASSESSMENT: PREVENTS OR INTERFERES SOME ACTIVE ACTIVITIES AND ADLS

## 2020-02-26 ASSESSMENT — LIFESTYLE VARIABLES: SMOKING_STATUS: 0

## 2020-02-26 NOTE — PROGRESS NOTES
DISCHARGE INSTRUCTIONS GIVEN TO FAMILY/. VERBALIZED UNDERSTANDING OF INSTRUCTIONS AND WRITTEN INSTRUCTIONS GIVEN. DISCHARGED PER WHEELCHAIR. ASSESSMENT UNCHANGED EXCEPT AS NOTED.

## 2020-02-26 NOTE — ANESTHESIA PRE PROCEDURE
Department of Anesthesiology  Preprocedure Note       Name:  Rosie Aguilar   Age:  67 y.o.  :  1940                                          MRN:  [id number]         Date:  2020      Surgeon: Diamond Choi):  Tasha Mckeon MD    Procedure: RIGHT SHOULDER ARTHROSCOPY WITH DEBRIDEMENT, ROTATOR CUFF REPAIR WITH REGENETEN PATCH, SUBACROMIAL DECOMPRESSION, BICEPS TENODESIS, EXCISION OF MASS  -BLOCK (Right )    Medications prior to admission:   Prior to Admission medications    Medication Sig Start Date End Date Taking?  Authorizing Provider   Probiotic Product (PROBIOTIC ADVANCED PO) Take by mouth   Yes Historical Provider, MD   Coenzyme Q10 (COQ10 PO) Take by mouth   Yes Historical Provider, MD   atorvastatin (LIPITOR) 40 MG tablet Take 40 mg by mouth daily   Yes Historical Provider, MD   AMLODIPINE BESYLATE PO Take by mouth   Yes Historical Provider, MD   VENLAFAXINE HCL PO Take by mouth   Yes Historical Provider, MD   Multiple Vitamins-Minerals (THERAPEUTIC MULTIVITAMIN-MINERALS) tablet Take 1 tablet by mouth daily   Yes Historical Provider, MD   METOPROLOL TARTRATE PO Take by mouth   Yes Historical Provider, MD   aspirin 81 MG tablet Take 81 mg by mouth daily    Historical Provider, MD       Current medications:    Current Facility-Administered Medications   Medication Dose Route Frequency Provider Last Rate Last Dose    EPINEPHrine 1 MG/ML injection             fentaNYL (SUBLIMAZE) 100 MCG/2ML injection             bupivacaine (PF) (MARCAINE) 0.5 % injection             lactated ringers infusion   Intravenous Continuous Betito Hsu  mL/hr at 20 1044      sodium chloride flush 0.9 % injection 10 mL  10 mL Intravenous 2 times per day Betito Hsu MD        sodium chloride flush 0.9 % injection 10 mL  10 mL Intravenous PRN Betito Hsu MD        famotidine (PEPCID) injection 20 mg  20 mg Intravenous Once Betito Hsu MD        ceFAZolin ALT    POC Tests: No results for input(s): POCGLU, POCNA, POCK, POCCL, POCBUN, POCHEMO, POCHCT in the last 72 hours. Coags: No results found for: PROTIME, INR, APTT    HCG (If Applicable): No results found for: PREGTESTUR, PREGSERUM, HCG, HCGQUANT     ABGs: No results found for: PHART, PO2ART, CRC1TQX, SCT5VMI, BEART, C3IGJRQF     Type & Screen (If Applicable):  No results found for: LABABO, [address]    Anesthesia Evaluation  Patient summary reviewed and Nursing notes reviewed no history of anesthetic complications:   Airway: Mallampati: II  TM distance: >3 FB   Neck ROM: full  Mouth opening: > = 3 FB Dental:    (+) upper dentures and lower dentures      Pulmonary:   (+) COPD:      (-) not a current smoker                           Cardiovascular:    (+) hypertension:,         Rhythm: regular  Rate: normal           Beta Blocker:  Dose within 24 Hrs         Neuro/Psych:   (+) CVA:,             GI/Hepatic/Renal: Neg GI/Hepatic/Renal ROS            Endo/Other: Negative Endo/Other ROS                    Abdominal:           Vascular:                                        Anesthesia Plan      regional and general     ASA 3     (Interscalene nerve block for post op analgesia PSR)  Induction: intravenous. MIPS: Prophylactic antiemetics administered. Anesthetic plan and risks discussed with patient. Plan discussed with CRNA.                   Janna Bautista DO   2/26/2020

## 2020-02-26 NOTE — ANESTHESIA PROCEDURE NOTES
Peripheral Block    Patient location during procedure: pre-op  Staffing  Anesthesiologist: Yamilet Wilson DO  Preanesthetic Checklist  Completed: patient identified, site marked, surgical consent, pre-op evaluation, timeout performed, IV checked, risks and benefits discussed, monitors and equipment checked, anesthesia consent given, oxygen available and patient being monitored  Peripheral Block  Patient position: supine  Prep: ChloraPrep  Patient monitoring: cardiac monitor, continuous pulse ox, continuous capnometry, frequent blood pressure checks and IV access  Block type: Brachial plexus  Laterality: right  Injection technique: single-shot  Procedures: ultrasound guided  Interscalene  Needle  Needle gauge: 22 G  Needle localization: anatomical landmarks and ultrasound guidance  Assessment  Injection assessment: negative aspiration for heme, no paresthesia on injection and local visualized surrounding nerve on ultrasound  Paresthesia pain: none  Slow fractionated injection: yes  Hemodynamics: stable  Additional Notes  Sterile prep. Timeout with SDS RN. 100 micrograms fentanyl administered for pt comfort. 30 mL 0.5% Ropivacaine injected in 5 mL increments following negative aspiration. Tip of needle in view at all times. No pain or paresthesias on injection. Pt tolerated the procedure well.    Reason for block: procedure for pain, post-op pain management and at surgeon's request

## 2020-02-26 NOTE — OP NOTE
Operative Note    Patient: Trecia Brittle  YOB: 1940  MRN: [id number]  Date of Procedure: 2/26/2020    Pre-Op Diagnosis: RIGHT SHOULDER ROTATOR CUFF TEAR, SKIN LESION    Post-Op Diagnosis: Same       Procedure(s):  1. Right shoulder arthroscopy, rotator cuff repair with Regeneten patch (45451)  2. Right shoulder arthroscopy, biceps tenodesis (41954)  3. Right shoulder arthroscopy, extensive debridement of labrum and subscapularis (48906)  4. Right shoulder arthroscopy, subacromial decompression (96309)  5. Excision benign skin lesion right shoulder 2.1-3 cm (60940)    Anesthesia: Regional, General    Surgeon(s):  Adry Sanders MD    Assistant: House Staff    Estimated Blood Loss (mL): less than 50     Complications: None    Specimens:   ID Type Source Tests Collected by Time Destination   A : skin lesion right shoulder Tissue Tissue SURGICAL PATHOLOGY Adry Sanders MD 2/26/2020 1259        Implants:  * No implants in log *      Drains: * No LDAs found *    Antibiotics: 2 g ancef IV prior to incision    Findings: Partial-thickness tear right supraspinatus rotator cuff approximately 50%, labral tear anterior, superior, and posterior labrum, acromial impingement, 2 cm skin lesion lateral aspect of right shoulder. Indications: This is a 78-year-old female with a history of right shoulder pain with a partial-thickness rotator cuff tear of the right shoulder. She has done physical therapy and tried medications as well as 2 corticosteroid injections, but continued having significant pain. In addition she had a small benign skin lesion in the lateral aspect of the right shoulder which was irritated by clothing. After discussing the pros and cons of treatment options and risks and benefits of surgical intervention, the patient elected to proceed with surgery.  They understood that there are no guarantee of results with surgery and there are always risks of infection, stiff joint, injury to nerve or blood vessel, blood clot, anesthesia complications, etc. The procedure and recovery were discussed and all questions answered. Risks and benefits of the procedure were fully explained in detail, including but not limited to infection, neurovascular injury, continued pain, arthritis, stiffness, need for further surgery, re-injury, DVT, PE, general risks of anesthesia and loss of limb or life. She was seen in the preoperative holding area where the right upper extremity was marked with indelible marker. An interscalene block was performed by anesthesia. Please see their notes regarding this procedure. From this point on anesthesia controlled the head, neck and airway throughout the procedure and please see their notes for documentation regarding this. They were transferred to the operating room where Anesthesia was administered and the patient was intubated. She was placed in the beachchair position with all extremities well-padded and a pillow/pad below the legs. The right upper extremity was then prepped and draped in a sterile normal orthopedic fashion and a spider arm positioner device was used. 2 g Ancef was administered IV prior to incision. A timeout was performed prior to incision. Exam under anesthesia revealed full range of motion and no instability. Initially there was a 2 cm round mobile raised reddish color skin lesion in the lateral aspect of the shoulder. Arthroscopy  All relevant landmarks were marked with a marker and incision was made in the posterior portal position. The trocar was inserted into the shoulder joint and the joint was encountered. Arthroscopic fluid was turned on and diagnostic arthroscopy commenced. The subscapularis tendon was visualized and there was some tearing at the superior border near the footprint without detachment. The biceps tendon was visualized and was intact with some signs of tenosynovitis.   There were minimal chondral changes to the glenoid as well as a small amount of grade 1-2 chondral changes to the humeral head. The supraspinatus did have a partial-thickness tear which appeared to be about 50% of the footprint from the undersurface and the remainder of the rotator cuff appeared intact. No loose bodies were found in the axillary pouch. There was also some tearing and fraying of the superior labrum near the biceps anchor point as well as the anterior and posterior labrum as well. We then took a spinal needle inserted at the anterior rotator cuff interval tissue and incision was made through the skin a cannula was used to create an anterior portal.  A probe was then inserted from probing of the biceps and other structures. JONATHAN Biceps Tenodesis  At this point we then took a spinal needle placed through the rotator interval and through the biceps tendon. Passed a Prolene and used this to shuttle a #2 FiberWire through the biceps tendon. We then repeated this process with the second passed through the biceps tendon with a #2 FiberWire. These were pulled out the skin for a percutaneous intratendinous tenodesis arthroscopically. After this was performed we cut the biceps tendon near the biceps anchor. There was a fair amount of fraying and tearing of the biceps tendon and anchor. Labral and subscapularis tendon debridement   The labrum was probed and there was tearing and fraying of the anterior, superior, and posterior labrum. An arthroscopic shaver was used to debride the torn and frayed tissue to stable tissue and all loose fragments removed. The subscapularis tendon was also debrided with the arthroscopic shaver near its attachment and the attachment was once again visualized to ensure it was not detached. Less than 50% of the subscapularis tendon superior border was involved. The arthroscope was then removed and the trocar placed in the subacromial space and the arthroscopic fluid turned on.     A spinal needle was used to localize a lateral portal and then a scalpel used incise the skin and a cannula was inserted through the lateral aspect of the shoulder.  At this point we then debrided the subacromial bursa with a combination of mechanical shaver as well as radiofrequency cautery. The biceps sutures were identified and the sutures were tied arthroscopically completing arthroscopic JONATHAN biceps tenodesis. Subacromial Decompression and Acromioplasty  Radiofrequency electrocautery was then used to remove remaining subacromial bursa as well as to clear off the coracoacromial ligament and peel it off of the acromion.  A spurring on the undersurface the acromion was noted.  A 4.0 mm barrel bur was used to debride the undersurface of the acromion and flatten out the spur.  After this is performed there was adequate room in the subacromial space for the rotator cuff tendon which did not appear to have any further impingement. Rotator Cuff Repair with REGENETEN Bioinductive Implant  The rotator cuff was then inspected carefully. A probe was used to probe the tendons for any defects or full-thickness tears. There was a significant of bursal fraying as well, however this did not communicate and was not a full-thickness tear of the of the supraspinatus. The decision was then made to move forward with rotator cuff repair with Regeneten bioinductive implant. The implant was loaded appropriately on its  and placed percutaneously through the lateral portal and then deployed. The implant was laid flat on top of the supraspinatus tendon with the lateral aspect covering the top of the greater tuberosity. A medium size implant was selected. When the implant was appropriately placed a spinal needle was used to localize a portal just off the edge of the acromion for tendon staples. A knife was used to create a portal and a cannula was placed through the skin for insertion of the tendon staples.   The first tendon staple was placed through the patch and into the tendon in the central medial aspect of the patch and deployed with good fixation. 5 additional tendon staples were placed both anterior and posterior on the patch for a total of 6 tendon staples and the  was removed. The cannula was then removed. The percutaneous bone staple  was then inserted percutaneously through the lateral portal.  The tines for the  were deployed. They were then inserted through the patch into the greater tuberosity in the anterior aspect of the patch. This was then tapped down in the central portion of the  was removed and a bone staple was inserted down the  and tapped into place and was deployed.  was then removed and re-loaded. This was then repeated for a second bone staple in the posterior aspect of the lateral aspect of the patch for a total of 2 staples. The  was then removed and the patch was inspected and found to be well fixed in good position. The arthroscope was then removed and all arthroscopic fluid was drained. Excision skin lesion right shoulder  Attention was then turned to the small skin lesion on the lateral aspect of the shoulder. It was mobile beneath fingers. An ellipse was drawn from the superior aspect of the lesion distally. Encircling the lesion planning out for incision. The length of the incision was approximately 3 cm. A full-thickness cut was made through the epidermis and dermis to the subcutaneous tissue along the elliptical incision lines excising the mass entire in its entirety and there was good hemostasis. The subcutaneous tissue was then visualized and appeared normal.  The tissue was then sent off for pathology.   Interrupted 2-0 Vicryl's were then placed in the incision to approximate the skin normally which appeared to approximate very well.      Closure  Incisions were copiously irrigated and closed with 2-0 vycril, 4-0 Monocryl in a buried fashion.  Steri-Strips were placed in a sterile dressing of 4 x 4's, ABDs, and foam tape was placed.  The patient was placed in a sling. They were awakened in the OR and transferred to PACU in stable condition.     Frandy Covarrubias MD  [id number] [facility] partner of [facility] ([facility])

## 2020-03-04 ENCOUNTER — HOSPITAL ENCOUNTER (OUTPATIENT)
Dept: PHYSICAL THERAPY | Age: 80
Setting detail: THERAPIES SERIES
Discharge: HOME | End: 2020-03-04
Payer: MEDICARE

## 2020-03-04 PROCEDURE — 97110 THERAPEUTIC EXERCISES: CPT

## 2020-03-04 PROCEDURE — 97112 NEUROMUSCULAR REEDUCATION: CPT

## 2020-03-04 PROCEDURE — 97161 PT EVAL LOW COMPLEX 20 MIN: CPT

## 2020-03-04 NOTE — FLOWSHEET NOTE
[place]                                          NMR re-education (17030)   CUES NEEDED                                                                   Therapeutic Activity (43243)                                                     Therapeutic Exercise and NMR EXR  [x] (12782) Provided verbal/tactile cueing for activities related to strengthening, flexibility, endurance, ROM  for improvements in scapular, scapulothoracic and UE control with self care, reaching, carrying, lifting, house/yardwork, driving/computer work. [x] (32507) Provided verbal/tactile cueing for activities related to improving balance, coordination, kinesthetic sense, posture, motor skill, proprioception  to assist with  scapular, scapulothoracic and UE control with self care, reaching, carrying, lifting, house/yardwork, driving/computer work. Therapeutic Activities:    [x] (96011 or 49372) Provided verbal/tactile cueing for activities related to improving balance, coordination, kinesthetic sense, posture, motor skill, proprioception and motor activation to allow for proper function of scapular, scapulothoracic and UE control with self care, carrying, lifting, driving/computer work.      Home Exercise Program:    [x] (17071) Reviewed/Progressed HEP activities related to strengthening, flexibility, endurance, ROM of scapular, scapulothoracic and UE control with self care, reaching, carrying, lifting, house/yardwork, driving/computer work  [] (47614) Reviewed/Progressed HEP activities related to improving balance, coordination, kinesthetic sense, posture, motor skill, proprioception of scapular, scapulothoracic and UE control with self care, reaching, carrying, lifting, house/yardwork, driving/computer work      Manual Treatments:  PROM / STM / Oscillations-Mobs:  G-I, II, III, IV (PA's, Inf., Post.)  [x] (74362) Provided manual therapy to mobilize soft tissue/joints of cervical/CT, scapular GHJ and UE for the purpose of modulating pain, promoting relaxation,  increasing ROM, reducing/eliminating soft tissue swelling/inflammation/restriction, improving soft tissue extensibility and allowing for proper ROM for normal function with self care, reaching, carrying, lifting, house/yardwork, driving/computer work    Modalities:     [x] GAME READY (VASO)- for significant edema, swelling, pain control. Charges:  Timed Code Treatment Minutes: 30   Total Treatment Minutes: 45      [x] EVAL (LOW) 88942 (typically 20 minutes face-to-face)  [] EVAL (MOD) 28214 (typically 30 minutes face-to-face)  [] EVAL (HIGH) 49548 (typically 45 minutes face-to-face)  [] RE-EVAL     [x] QB(32015) x 1    [] IONTO  [x] NMR (38861) x 1    [] VASO  [] Manual (84340) x     [] Other:  [] TA x      [] Mech Traction (27470)  [] ES(attended) (64546)      [] ES (un) (15767):    ASSESSMENT:  See eval      GOALS:  Patient stated goal: Pt would like to decrease her pain.     Therapist goals for Patient:   Short Term Goals: To be achieved in: 2 weeks  1. Independent in HEP and progression per patient tolerance, in order to prevent re-injury. [x]? Progressing: []? Met: []? Not Met: []? Adjusted      2. Patient will have a decrease in pain to facilitate improvement in movement, function, and ADLs as indicated by Functional Deficits. [x]? Progressing: []? Met: []? Not Met: []? Adjusted      Long Term Goals: To be achieved in: 12 weeks  1. Disability index score of 10% or less for the DASH to assist with reaching prior level of function. [x]? Progressing: []? Met: []? Not Met: []? Adjusted      2. Patient will demonstrate increased AROM to R shoulder = to L shoulder to allow for proper joint functioning as indicated by patients Functional Deficits. [x]? Progressing: []? Met: []? Not Met: []? Adjusted      3. Patient will demonstrate an increase in Strength to 5/5 in all planes of R shoulder to allow for proper functional mobility as indicated by patients Functional Deficits. [x]?

## 2020-03-04 NOTE — PLAN OF CARE
[facility], [address] [place] [address] [place], [address] [facility] [address]  Phone: [phone]   Fax:     [phone]                                                       Physical Therapy Certification    Dear Referring Practitioner: Dr. Ayanna Do ,    We had the pleasure of evaluating the following patient for physical therapy services at [address] [place] [address]. A summary of our findings can be found in the initial assessment below. This includes our plan of care. If you have any questions or concerns regarding these findings, please do not hesitate to contact me at the office phone number checked above. Thank you for the referral.       Physician Signature:_______________________________Date:__________________  By signing above (or electronic signature), therapists plan is approved by physician      Patient: Bridget Montenegroies   : 1940   MRN: [id number]  Referring Physician: Referring Practitioner: Dr. Ayanna Do       Evaluation Date: 3/4/2020      Medical Diagnosis Information:  Diagnosis: M75.111 (ICD-10-CM) - Incomplete tear of right rotator cuff, unspecified whether traumatic   Treatment Diagnosis: R shoulder pain, s/p R RCR with regeneten patch and biceps tenodesis with labral debridement DOS 20                                         Insurance information: PT Insurance Information: [facility]/MEDICARE    Precautions/ Contra-indications: REGENETEN PATCH PROTOCOL, PROTECT BICEPS, HTN controlled, previous stroke 1982 weak on the left side. Latex Allergy:  [x]NO      []YES  Preferred Language for Healthcare:   [x]English       []other:    SUBJECTIVE: Patient stated complaint:Pt reports R shoulder pain s/p R RCR with regeneten patch with a biceps tenodesis and extensive debridement DOS 20.      Relevant Medical History:none  Functional Disability Index:  QD 91%     Pain Scale: 4-6/10  Easing factors: rest  Provocative factors: movement     Type: []Constant   []Intermittent  []Radiating []Localized []other:     Numbness/Tingling: none    Occupation/School: retired    Living Status/Prior Level of Function: Independent with ADLs and IADLs,     OBJECTIVE:     CERV ROM     Cervical Flexion     Cervical Extension     Cervical SB     Cervical rotation          ROM Left Right   Shoulder Flex WFL all planes 35 deg PROM   Shoulder Abd  nt   Shoulder ER  To neutral   Shoulder IR  nt                  Strength  Left Right   Shoulder Flex 5/5 all planes nt   Shoulder Scap  nt   Shoulder ER  nt   Shoulder IR  nt               Reflexes/Sensation:    [x]Dermatomes/Myotomes intact    [x]Reflexes equal and normal bilaterally   []Other:    Joint mobility: na   []Normal    []Hypo   []Hyper    Palpation: palpable edema throughout R shoulder    Functional Mobility/Transfers: na    Posture: rounded shoulders    Bandages/Dressings/Incisions: incisions healing as expected with no SOI. Gait: (include devices/WB status): WNL    Orthopedic Special Tests: none                       [x] Patient history, allergies, meds reviewed. Medical chart reviewed. See intake form. Review Of Systems (ROS):  [x]Performed Review of systems (Integumentary, CardioPulmonary, Neurological) by intake and observation. Intake form has been scanned into medical record. Patient has been instructed to contact their primary care physician regarding ROS issues if not already being addressed at this time.       Co-morbidities/Complexities (which will affect course of rehabilitation):   []None           Arthritic conditions   []Rheumatoid arthritis (M05.9)  [x]Osteoarthritis (M19.91)   Cardiovascular conditions   [x]Hypertension (I10)  []Hyperlipidemia (E78.5)  []Angina pectoris (I20)  []Atherosclerosis (I70)   Musculoskeletal conditions   []Disc pathology   []Congenital spine pathologies   []Prior surgical intervention  []Osteoporosis (M81.8)  []Osteopenia (M85.8)   Endocrine conditions   []Hypothyroid (E03.9)  []Hyperthyroid Gastrointestinal conditions   []Constipation (P73.01)   Metabolic conditions   []Morbid obesity (E66.01)  []Diabetes type 1(E10.65) or 2 (E11.65)   []Neuropathy (G60.9)     Pulmonary conditions   []Asthma (J45)  []Coughing   []COPD (J44.9)   Psychological Disorders  []Anxiety (F41.9)  []Depression (F32.9)   []Other:   []Other:          Barriers to/and or personal factors that will affect rehab potential:              []Age  []Sex              []Motivation/Lack of Motivation                        []Co-Morbidities              []Cognitive Function, education/learning barriers              []Environmental, home barriers              []profession/work barriers  []past PT/medical experience  []other:  Justification:      Falls Risk Assessment (30 days):   [x] Falls Risk assessed and no intervention required.   [] Falls Risk assessed and Patient requires intervention due to being higher risk   TUG score (>12s at risk):     [] Falls education provided, including       G-Codes:       ASSESSMENT:   Functional Impairments   []Noted spinal or UE joint hypomobility   []Noted spinal or UE joint hypermobility   [x]Decreased UE functional ROM   [x]Decreased UE functional strength   []Abnormal reflexes/sensation/myotomal/dermatomal deficits   [x]Decreased RC/scapular/core strength and neuromuscular control   []other:      Functional Activity Limitations (from functional questionnaire and intake)   [x]Reduced ability to tolerate prolonged functional positions   [x]Reduced ability or difficulty with changes of positions or transfers between positions   [x]Reduced ability to maintain good posture and demonstrate good body mechanics with sitting, bending, and lifting   [x] Reduced ability or tolerance with driving and/or computer work   [x]Reduced ability to sleep   [x]Reduced ability to perform lifting, reaching, carrying tasks   [x]Reduced ability to tolerate elements   [x] A clinical presentation with:  [] stable and/or uncomplicated characteristics   [] evolving clinical presentation with changing characteristics  [] unstable and unpredictable characteristics;   [x] Clinical decision making of [x] low, [] moderate, [] high complexity using standardized patient assessment instrument and/or measurable assessment of functional outcome. [x] EVAL (LOW) 69591 (typically 20 minutes face-to-face)  [] EVAL (MOD) 15953 (typically 30 minutes face-to-face)  [] EVAL (HIGH) 13186 (typically 45 minutes face-to-face)  [] RE-EVAL     PLAN:  Frequency/Duration:  2 days per week for 12 Weeks:  INTERVENTIONS:  [x] Therapeutic exercise including: strength training, ROM, for Upper extremity and core   [x]  NMR activation and proprioception for UE, scap and Core   [x] Manual therapy as indicated for shoulder, scapula and spine to include: Dry Needling/IASTM, STM, PROM, Gr I-IV mobilizations, manipulation. [x] Modalities as needed that may include: thermal agents, E-stim, Biofeedback, US, iontophoresis as indicated  [x] Patient education on joint protection, postural re-education, activity modification, progression of HEP. HEP instruction: (see scanned forms)    GOALS:  Patient stated goal: Pt would like to decrease her pain. Therapist goals for Patient:   Short Term Goals: To be achieved in: 2 weeks  1. Independent in HEP and progression per patient tolerance, in order to prevent re-injury. [x] Progressing: [] Met: [] Not Met: [] Adjusted     2. Patient will have a decrease in pain to facilitate improvement in movement, function, and ADLs as indicated by Functional Deficits. [x] Progressing: [] Met: [] Not Met: [] Adjusted     Long Term Goals: To be achieved in: 12 weeks  1. Disability index score of 10% or less for the DASH to assist with reaching prior level of function. [x] Progressing: [] Met: [] Not Met: [] Adjusted     2.  Patient will demonstrate increased AROM to R shoulder = to L shoulder to allow for proper joint functioning as indicated by patients Functional Deficits. [x] Progressing: [] Met: [] Not Met: [] Adjusted     3. Patient will demonstrate an increase in Strength to 5/5 in all planes of R shoulder to allow for proper functional mobility as indicated by patients Functional Deficits. [x] Progressing: [] Met: [] Not Met: [] Adjusted     4. Patient will return to all functional activities without increased symptoms or restriction. [x] Progressing: [] Met: [] Not Met: [] Adjusted     5.  Pt will demonstrate the ability to perform all ADL's with 0/10 pain. (patient specific functional goal)    [x] Progressing: [] Met: [] Not Met: [] Adjusted      Electronically signed by:  Antwan Zarate PT

## 2020-03-05 RX ORDER — OXYCODONE HYDROCHLORIDE AND ACETAMINOPHEN 5; 325 MG/1; MG/1
1 TABLET ORAL EVERY 6 HOURS PRN
Qty: 28 TABLET | Refills: 0 | Status: SHIPPED | OUTPATIENT
Start: 2020-03-05 | End: 2020-03-10 | Stop reason: ALTCHOICE

## 2020-03-10 ENCOUNTER — HOSPITAL ENCOUNTER (OUTPATIENT)
Dept: PHYSICAL THERAPY | Age: 80
Setting detail: THERAPIES SERIES
Discharge: HOME | End: 2020-03-10
Payer: MEDICARE

## 2020-03-10 ENCOUNTER — OFFICE VISIT (OUTPATIENT)
Dept: ORTHOPEDIC SURGERY | Age: 80
End: 2020-03-10

## 2020-03-10 VITALS — HEIGHT: 62 IN | BODY MASS INDEX: 24.67 KG/M2 | WEIGHT: 134.04 LBS

## 2020-03-10 PROCEDURE — 97110 THERAPEUTIC EXERCISES: CPT

## 2020-03-10 PROCEDURE — 99024 POSTOP FOLLOW-UP VISIT: CPT | Performed by: ORTHOPAEDIC SURGERY

## 2020-03-10 PROCEDURE — 97112 NEUROMUSCULAR REEDUCATION: CPT

## 2020-03-10 PROCEDURE — 97140 MANUAL THERAPY 1/> REGIONS: CPT

## 2020-03-10 RX ORDER — HYDROCODONE BITARTRATE AND ACETAMINOPHEN 5; 325 MG/1; MG/1
1 TABLET ORAL EVERY 6 HOURS PRN
Qty: 28 TABLET | Refills: 0 | Status: SHIPPED | OUTPATIENT
Start: 2020-03-10 | End: 2020-03-17

## 2020-03-10 NOTE — PROGRESS NOTES
Chief Complaint  Post-Op Check (Right Shoulder: rotator cuff repair with Regeneten patch, biceps tenodesis, extensive debridement of labrum and subscapularis, subacromial decompression, and Excision benign skin lesion right shoulder )      History of Present Illness:  Darrick Lane is a 67 y.o. y/o female who presents post op 2 weeks status post right shoulder arthroscopy with debridement, rotator cuff repair with Regeneten, subacromial decompression, biceps tenodesis, excision benign skin lesion right shoulder. She is doing well with only some pain with certain activities. She is weaning out of her sling. She is doing physical therapy. She has therapy scheduled today after this. She is taking Norco that she has been borrowing from someone else as she did not tolerate Percocet well and is requesting Spero Dredge today for after therapy. She denies any fevers, chills, night sweats, nausea, vomiting. She denies excessive numbness, tingling, calf pain, chest pain, shortness of breath. She denies erythema, warmth, excessive drainage from the surgical site. Vital Signs  There were no vitals filed for this visit. General Exam:   Constitutional: Patient is adequately groomed with no evidence of malnutrition  Mental Status: The patient is oriented to time, place and person. The patient's mood and affect are appropriate. Lymphatic: The lymphatic examination bilaterally reveals all areas to be without enlargement or induration. Neurological: The patient has good coordination. There is no weakness or sensory deficit. Gait: Normal    Right shoulder examination  Inspection:  Incisions clean, dry, and in tact without erythema, warmth, purulent drainage, or other signs of infection. Palpation:  Mild swelling and tenderness near the operative site    Range of Motion:  Forward elevation 140, external rotation 30    Sensation: In tact to light touch all nerve distributions     Strength:  Normal motor exam limited accuracy of this automated transcription, some errors in transcription may have occurred.

## 2020-03-10 NOTE — FLOWSHEET NOTE
Intervention ([id number])       PROM  15'                                         NMR re-education (30087)   CUES NEEDED                                                                   Therapeutic Activity (25890)                                                     Therapeutic Exercise and NMR EXR  [x] (04258) Provided verbal/tactile cueing for activities related to strengthening, flexibility, endurance, ROM  for improvements in scapular, scapulothoracic and UE control with self care, reaching, carrying, lifting, house/yardwork, driving/computer work. [x] (21313) Provided verbal/tactile cueing for activities related to improving balance, coordination, kinesthetic sense, posture, motor skill, proprioception  to assist with  scapular, scapulothoracic and UE control with self care, reaching, carrying, lifting, house/yardwork, driving/computer work. Therapeutic Activities:    [x] (99631 or 51678) Provided verbal/tactile cueing for activities related to improving balance, coordination, kinesthetic sense, posture, motor skill, proprioception and motor activation to allow for proper function of scapular, scapulothoracic and UE control with self care, carrying, lifting, driving/computer work.      Home Exercise Program:    [x] (03441) Reviewed/Progressed HEP activities related to strengthening, flexibility, endurance, ROM of scapular, scapulothoracic and UE control with self care, reaching, carrying, lifting, house/yardwork, driving/computer work  [] (88414) Reviewed/Progressed HEP activities related to improving balance, coordination, kinesthetic sense, posture, motor skill, proprioception of scapular, scapulothoracic and UE control with self care, reaching, carrying, lifting, house/yardwork, driving/computer work      Manual Treatments:  PROM / STM / Oscillations-Mobs:  G-I, II, III, IV (PA's, Inf., Post.)  [x] (34688) Provided manual therapy to mobilize soft tissue/joints of cervical/CT, scapular GHJ and UE for the purpose of modulating pain, promoting relaxation,  increasing ROM, reducing/eliminating soft tissue swelling/inflammation/restriction, improving soft tissue extensibility and allowing for proper ROM for normal function with self care, reaching, carrying, lifting, house/yardwork, driving/computer work    Modalities:     [] GAME READY (VASO)- for significant edema, swelling, pain control. Charges:  Timed Code Treatment Minutes: 38   Total Treatment Minutes: 38      [] EVAL (LOW) 93182 (typically 20 minutes face-to-face)  [] EVAL (MOD) 18264 (typically 30 minutes face-to-face)  [] EVAL (HIGH) 24451 (typically 45 minutes face-to-face)  [] RE-EVAL     [x] HI(48195) x 1    [] IONTO  [x] NMR (61664) x 1    [] VASO  [x] Manual (58225) x 1    [] Other:  [] TA x      [] Mech Traction (41541)  [] ES(attended) (89471)      [] ES (un) (40661):    ASSESSMENT:  See eval      GOALS:  Patient stated goal: Pt would like to decrease her pain.     Therapist goals for Patient:   Short Term Goals: To be achieved in: 2 weeks  1. Independent in HEP and progression per patient tolerance, in order to prevent re-injury. [x]? Progressing: []? Met: []? Not Met: []? Adjusted      2. Patient will have a decrease in pain to facilitate improvement in movement, function, and ADLs as indicated by Functional Deficits. [x]? Progressing: []? Met: []? Not Met: []? Adjusted      Long Term Goals: To be achieved in: 12 weeks  1. Disability index score of 10% or less for the DASH to assist with reaching prior level of function. [x]? Progressing: []? Met: []? Not Met: []? Adjusted      2. Patient will demonstrate increased AROM to R shoulder = to L shoulder to allow for proper joint functioning as indicated by patients Functional Deficits. [x]? Progressing: []? Met: []? Not Met: []? Adjusted      3.  Patient will demonstrate an increase in Strength to 5/5 in all planes of R shoulder to allow for proper functional mobility as indicated by patients Functional Deficits. [x]? Progressing: []? Met: []? Not Met: []? Adjusted      4. Patient will return to all functional activities without increased symptoms or restriction. [x]? Progressing: []? Met: []? Not Met: []? Adjusted      5. Pt will demonstrate the ability to perform all ADL's with 0/10 pain. (patient specific functional goal)    [x]? Progressing: []? Met: []? Not Met: []? Adjusted             Overall Progression Towards Functional goals/ Treatment Progress Update:  [] Patient is progressing as expected towards functional goals listed. [] Progression is slowed due to complexities/Impairments listed. [] Progression has been slowed due to co-morbidities. [x] Plan just implemented, too soon to assess goals progression <30days   [] Goals require adjustment due to lack of progress  [] Patient is not progressing as expected and requires additional follow up with physician  [] Other    Prognosis for POC: [x] Good [] Fair  [] Poor      Patient requires continued skilled intervention: [x] Yes  [] No    Treatment/Activity Tolerance:  [x] Patient able to complete treatment  [] Patient limited by fatigue  [] Patient limited by pain    [] Patient limited by other medical complications  [] Other:       PLAN: See eval  [] Continue per plan of care [] Alter current plan (see comments above)  [x] Plan of care initiated [] Hold pending MD visit [] Discharge    Electronically signed by:  James Medina PT    Note: If patient does not return for scheduled/ recommended follow up visits, this note will serve as a discharge from care along with most recent update on progress.

## 2020-03-13 ENCOUNTER — HOSPITAL ENCOUNTER (OUTPATIENT)
Dept: PHYSICAL THERAPY | Age: 80
Setting detail: THERAPIES SERIES
Discharge: HOME | End: 2020-03-13
Payer: MEDICARE

## 2020-03-13 PROCEDURE — 97140 MANUAL THERAPY 1/> REGIONS: CPT

## 2020-03-13 PROCEDURE — 97112 NEUROMUSCULAR REEDUCATION: CPT

## 2020-03-13 PROCEDURE — 97110 THERAPEUTIC EXERCISES: CPT

## 2020-03-13 NOTE — FLOWSHEET NOTE
AAROM elbow flexion 2 10     ER S PROM 5'' 10     Table slide flexion 2 10     pendulums 2 10     pulleys 5'      ER/ext isometric 3\" 20 ea    Concentric circles 1 20                          Manual Intervention (02305)       PROM  15'                                         NMR re-education (72128)   CUES NEEDED                                                                   Therapeutic Activity (87429)                                                     Therapeutic Exercise and NMR EXR  [x] (20127) Provided verbal/tactile cueing for activities related to strengthening, flexibility, endurance, ROM  for improvements in scapular, scapulothoracic and UE control with self care, reaching, carrying, lifting, house/yardwork, driving/computer work. [x] (25051) Provided verbal/tactile cueing for activities related to improving balance, coordination, kinesthetic sense, posture, motor skill, proprioception  to assist with  scapular, scapulothoracic and UE control with self care, reaching, carrying, lifting, house/yardwork, driving/computer work. Therapeutic Activities:    [x] (04435 or 76606) Provided verbal/tactile cueing for activities related to improving balance, coordination, kinesthetic sense, posture, motor skill, proprioception and motor activation to allow for proper function of scapular, scapulothoracic and UE control with self care, carrying, lifting, driving/computer work.      Home Exercise Program:    [x] (62291) Reviewed/Progressed HEP activities related to strengthening, flexibility, endurance, ROM of scapular, scapulothoracic and UE control with self care, reaching, carrying, lifting, house/yardwork, driving/computer work  [] (83463) Reviewed/Progressed HEP activities related to improving balance, coordination, kinesthetic sense, posture, motor skill, proprioception of scapular, scapulothoracic and UE control with self care, reaching, carrying, lifting, house/yardwork, driving/computer work Manual Treatments:  PROM / STM / Oscillations-Mobs:  G-I, II, III, IV (PA's, Inf., Post.)  [x] (58645) Provided manual therapy to mobilize soft tissue/joints of cervical/CT, scapular GHJ and UE for the purpose of modulating pain, promoting relaxation,  increasing ROM, reducing/eliminating soft tissue swelling/inflammation/restriction, improving soft tissue extensibility and allowing for proper ROM for normal function with self care, reaching, carrying, lifting, house/yardwork, driving/computer work    Modalities:     [] GAME READY (VASO)- for significant edema, swelling, pain control. Charges:  Timed Code Treatment Minutes: 38   Total Treatment Minutes: 38      [] EVAL (LOW) 98606 (typically 20 minutes face-to-face)  [] EVAL (MOD) 65001 (typically 30 minutes face-to-face)  [] EVAL (HIGH) 99816 (typically 45 minutes face-to-face)  [] RE-EVAL     [x] QC(56403) x 1    [] IONTO  [x] NMR (06835) x 1    [] VASO  [x] Manual (61503) x 1    [] Other:  [] TA x      [] Mech Traction (45508)  [] ES(attended) (28506)      [] ES (un) (66546):    ASSESSMENT:  Pt juany session well. Stiff and painful about 120° flexion. GOALS:  Patient stated goal: Pt would like to decrease her pain.     Therapist goals for Patient:   Short Term Goals: To be achieved in: 2 weeks  1. Independent in HEP and progression per patient tolerance, in order to prevent re-injury. [x]? Progressing: []? Met: []? Not Met: []? Adjusted      2. Patient will have a decrease in pain to facilitate improvement in movement, function, and ADLs as indicated by Functional Deficits. [x]? Progressing: []? Met: []? Not Met: []? Adjusted      Long Term Goals: To be achieved in: 12 weeks  1. Disability index score of 10% or less for the DASH to assist with reaching prior level of function. [x]? Progressing: []? Met: []? Not Met: []? Adjusted      2.  Patient will demonstrate increased AROM to R shoulder = to L shoulder to allow for proper joint functioning as

## 2020-03-16 ENCOUNTER — HOSPITAL ENCOUNTER (OUTPATIENT)
Dept: PHYSICAL THERAPY | Age: 80
Setting detail: THERAPIES SERIES
Discharge: HOME | End: 2020-03-16
Payer: MEDICARE

## 2020-03-19 ENCOUNTER — HOSPITAL ENCOUNTER (OUTPATIENT)
Dept: PHYSICAL THERAPY | Age: 80
Setting detail: THERAPIES SERIES
Discharge: HOME | End: 2020-03-19
Payer: MEDICARE

## 2020-03-19 NOTE — FLOWSHEET NOTE
[facility], [address] [place] [address] [address]  Phone: [phone]   Fax:     [phone]    Physical Therapy  Cancellation/No-show Note  Patient Name:  James Gentile  :  1940   Date:  3/19/2020    Cancelled visits to date: 2  No-shows to date: 0    For today's appointment patient:  [x]  Cancelled  []  Rescheduled appointment  []  No-show     Reason given by patient:  [x]  Patient ill  []  Conflicting appointment  []  No transportation    []  Conflict with work  []  No reason given  []  Other:     Comments:      Phone call information:   []  Phone call made today to patient at _ time at number provided:      []  Patient answered, conversation as follows:    []  Patient did not answer, message left as follows:  []  Phone call not made today  [x]  Phone call not needed - pt contacted us to cancel and provided reason for cancellation.      Electronically signed by:  Joe Lay PT

## 2020-03-24 ENCOUNTER — APPOINTMENT (OUTPATIENT)
Dept: PHYSICAL THERAPY | Age: 80
End: 2020-03-24
Payer: MEDICARE

## 2020-03-26 ENCOUNTER — APPOINTMENT (OUTPATIENT)
Dept: PHYSICAL THERAPY | Age: 80
End: 2020-03-26
Payer: MEDICARE

## 2020-04-07 ENCOUNTER — VIRTUAL VISIT (OUTPATIENT)
Dept: ORTHOPEDIC SURGERY | Age: 80
End: 2020-04-07

## 2020-04-07 NOTE — PROGRESS NOTES
Called to speak with patient regarding her right shoulder. Patient did not answer the phone. Message was left with return number as well. We will attempt to contact patient again. Frandy Covarrubias MD  [facility] partner of [facility] ([facility])

## 2020-04-17 RX ORDER — TRAMADOL HYDROCHLORIDE 50 MG/1
50 TABLET ORAL EVERY 6 HOURS PRN
Qty: 28 TABLET | Refills: 0 | Status: SHIPPED | OUTPATIENT
Start: 2020-04-17 | End: 2020-04-24

## 2020-06-23 ENCOUNTER — APPOINTMENT (OUTPATIENT)
Dept: GENERAL RADIOLOGY | Age: 80
End: 2020-06-23
Payer: MEDICARE

## 2020-06-23 ENCOUNTER — APPOINTMENT (OUTPATIENT)
Dept: CT IMAGING | Age: 80
End: 2020-06-23
Payer: MEDICARE

## 2020-06-23 ENCOUNTER — HOSPITAL ENCOUNTER (EMERGENCY)
Age: 80
Discharge: HOME | End: 2020-06-23
Attending: EMERGENCY MEDICINE
Payer: MEDICARE

## 2020-06-23 VITALS
HEIGHT: 61 IN | WEIGHT: 128 LBS | TEMPERATURE: 98.4 F | OXYGEN SATURATION: 99 % | SYSTOLIC BLOOD PRESSURE: 129 MMHG | DIASTOLIC BLOOD PRESSURE: 81 MMHG | BODY MASS INDEX: 24.17 KG/M2 | RESPIRATION RATE: 16 BRPM | HEART RATE: 70 BPM

## 2020-06-23 LAB
A/G RATIO: 1 (ref 1.1–2.2)
ALBUMIN SERPL-MCNC: 3.6 G/DL (ref 3.4–5)
ALP BLD-CCNC: 147 U/L (ref 40–129)
ALT SERPL-CCNC: 10 U/L (ref 10–40)
AMORPHOUS: ABNORMAL /HPF
ANION GAP SERPL CALCULATED.3IONS-SCNC: 10 MMOL/L (ref 3–16)
AST SERPL-CCNC: 24 U/L (ref 15–37)
BACTERIA: ABNORMAL /HPF
BASOPHILS ABSOLUTE: 0.1 K/UL (ref 0–0.2)
BASOPHILS RELATIVE PERCENT: 0.7 %
BILIRUB SERPL-MCNC: 0.7 MG/DL (ref 0–1)
BILIRUBIN URINE: NEGATIVE
BLOOD, URINE: ABNORMAL
BUN BLDV-MCNC: 13 MG/DL (ref 7–20)
CALCIUM SERPL-MCNC: 9 MG/DL (ref 8.3–10.6)
CHLORIDE BLD-SCNC: 95 MMOL/L (ref 99–110)
CLARITY: CLEAR
CO2: 27 MMOL/L (ref 21–32)
COLOR: YELLOW
CREAT SERPL-MCNC: 0.7 MG/DL (ref 0.6–1.2)
EOSINOPHILS ABSOLUTE: 0 K/UL (ref 0–0.6)
EOSINOPHILS RELATIVE PERCENT: 0.1 %
EPITHELIAL CELLS, UA: ABNORMAL /HPF (ref 0–5)
GFR AFRICAN AMERICAN: >60
GFR NON-AFRICAN AMERICAN: >60
GLOBULIN: 3.7 G/DL
GLUCOSE BLD-MCNC: 160 MG/DL (ref 70–99)
GLUCOSE URINE: NEGATIVE MG/DL
HCT VFR BLD CALC: 42.8 % (ref 36–48)
HEMOGLOBIN: 13.9 G/DL (ref 12–16)
KETONES, URINE: NEGATIVE MG/DL
LEUKOCYTE ESTERASE, URINE: ABNORMAL
LYMPHOCYTES ABSOLUTE: 1.2 K/UL (ref 1–5.1)
LYMPHOCYTES RELATIVE PERCENT: 11.9 %
MCH RBC QN AUTO: 29.4 PG (ref 26–34)
MCHC RBC AUTO-ENTMCNC: 32.5 G/DL (ref 31–36)
MCV RBC AUTO: 90.6 FL (ref 80–100)
MICROSCOPIC EXAMINATION: YES
MONOCYTES ABSOLUTE: 0.9 K/UL (ref 0–1.3)
MONOCYTES RELATIVE PERCENT: 8.8 %
MUCUS: ABNORMAL /LPF
NEUTROPHILS ABSOLUTE: 7.7 K/UL (ref 1.7–7.7)
NEUTROPHILS RELATIVE PERCENT: 78.5 %
NITRITE, URINE: NEGATIVE
PDW BLD-RTO: 14.3 % (ref 12.4–15.4)
PH UA: 6.5 (ref 5–8)
PLATELET # BLD: 217 K/UL (ref 135–450)
PMV BLD AUTO: 8.5 FL (ref 5–10.5)
POTASSIUM SERPL-SCNC: 3.9 MMOL/L (ref 3.5–5.1)
PROTEIN UA: NEGATIVE MG/DL
RBC # BLD: 4.73 M/UL (ref 4–5.2)
RBC UA: ABNORMAL /HPF (ref 0–4)
SODIUM BLD-SCNC: 132 MMOL/L (ref 136–145)
SPECIFIC GRAVITY UA: 1.01 (ref 1–1.03)
TOTAL PROTEIN: 7.3 G/DL (ref 6.4–8.2)
TROPONIN: <0.01 NG/ML
URINE REFLEX TO CULTURE: ABNORMAL
URINE TYPE: ABNORMAL
UROBILINOGEN, URINE: 1 E.U./DL
WBC # BLD: 9.8 K/UL (ref 4–11)
WBC UA: ABNORMAL /HPF (ref 0–5)

## 2020-06-23 PROCEDURE — 70450 CT HEAD/BRAIN W/O DYE: CPT

## 2020-06-23 PROCEDURE — 84484 ASSAY OF TROPONIN QUANT: CPT

## 2020-06-23 PROCEDURE — 93005 ELECTROCARDIOGRAM TRACING: CPT | Performed by: EMERGENCY MEDICINE

## 2020-06-23 PROCEDURE — 73030 X-RAY EXAM OF SHOULDER: CPT

## 2020-06-23 PROCEDURE — 71046 X-RAY EXAM CHEST 2 VIEWS: CPT

## 2020-06-23 PROCEDURE — 87086 URINE CULTURE/COLONY COUNT: CPT

## 2020-06-23 PROCEDURE — 80053 COMPREHEN METABOLIC PANEL: CPT

## 2020-06-23 PROCEDURE — 81001 URINALYSIS AUTO W/SCOPE: CPT

## 2020-06-23 PROCEDURE — 72125 CT NECK SPINE W/O DYE: CPT

## 2020-06-23 PROCEDURE — 99284 EMERGENCY DEPT VISIT MOD MDM: CPT

## 2020-06-23 PROCEDURE — 85025 COMPLETE CBC W/AUTO DIFF WBC: CPT

## 2020-06-23 ASSESSMENT — ENCOUNTER SYMPTOMS
ABDOMINAL PAIN: 0
COLOR CHANGE: 0
SORE THROAT: 0
RHINORRHEA: 0
SHORTNESS OF BREATH: 0

## 2020-06-23 ASSESSMENT — PAIN DESCRIPTION - LOCATION: LOCATION: SHOULDER

## 2020-06-23 ASSESSMENT — PAIN SCALES - GENERAL: PAINLEVEL_OUTOF10: 6

## 2020-06-23 ASSESSMENT — PAIN DESCRIPTION - ORIENTATION: ORIENTATION: RIGHT

## 2020-06-24 LAB
EKG ATRIAL RATE: 75 BPM
EKG DIAGNOSIS: NORMAL
EKG P AXIS: 69 DEGREES
EKG P-R INTERVAL: 136 MS
EKG Q-T INTERVAL: 420 MS
EKG QRS DURATION: 102 MS
EKG QTC CALCULATION (BAZETT): 469 MS
EKG R AXIS: -38 DEGREES
EKG T AXIS: 33 DEGREES
EKG VENTRICULAR RATE: 75 BPM
URINE CULTURE, ROUTINE: NORMAL

## 2020-06-24 PROCEDURE — 93010 ELECTROCARDIOGRAM REPORT: CPT | Performed by: INTERNAL MEDICINE

## 2020-06-24 NOTE — ED PROVIDER NOTES
She had full mental capacity to make her own medical decisions. She understands if it was related to an arrhythmia, it could cause severe disability or death if it returns but again she is willing to accept this risk. She knows that she can always return to the ED if she develops severe headache, vomiting, passes out, worsening fever, other concerning symptoms, but otherwise follow-up as we discussed urgently over the next few days. She was well-appearing and in no acute distress at time of discharge and felt comfortable with this plan.      Kavitha Mixon MD  06/24/20 2266

## 2020-06-24 NOTE — ED PROVIDER NOTES
Medical: Not on file     Non-medical: Not on file   Tobacco Use    Smoking status: Former Smoker     Packs/day: 1.50     Types: Cigarettes    Smokeless tobacco: Never Used   Substance and Sexual Activity    Alcohol use: Not Currently    Drug use: Not Currently     Types: Marijuana    Sexual activity: Not on file   Lifestyle    Physical activity     Days per week: Not on file     Minutes per session: Not on file    Stress: Not on file   Relationships    Social connections     Talks on phone: Not on file     Gets together: Not on file     Attends [employer] service: Not on file     Active member of club or organization: Not on file     Attends meetings of clubs or organizations: Not on file     Relationship status: Not on file    Intimate partner violence     Fear of current or ex partner: Not on file     Emotionally abused: Not on file     Physically abused: Not on file     Forced sexual activity: Not on file   Other Topics Concern    Not on file   Social History Narrative    Not on file       SCREENINGS             PHYSICAL EXAM    (up to 7 for level 4, 8 ormore for level 5)     ED Triage Vitals [06/23/20 1742]   BP Temp Temp Source Pulse Resp SpO2 Height Weight   130/72 100.4 °F (38 °C) Tympanic 68 18 96 % 5' 1\" (1.549 m) 128 lb (58.1 kg)       Physical Exam  Constitutional:       Appearance: She is well-developed. HENT:      Head: Normocephalic and atraumatic. Neck:      Musculoskeletal: Normal range of motion. No spinous process tenderness or muscular tenderness. Cardiovascular:      Rate and Rhythm: Normal rate. Pulmonary:      Effort: Pulmonary effort is normal. No respiratory distress. Abdominal:      General: There is no distension. Palpations: Abdomen is soft. Musculoskeletal:         General: Tenderness present. No swelling, deformity or signs of injury. Comments: Decreased range of motion to right shoulder due to pain elicited with movement.   Sensation strength and pulse intact to right hand. No edema or ecchymosis noted. Skin:     General: Skin is warm and dry. Neurological:      Mental Status: She is alert and oriented to person, place, and time. DIAGNOSTIC RESULTS   LABS:    Labs Reviewed   COMPREHENSIVE METABOLIC PANEL - Abnormal; Notable for the following components:       Result Value    Sodium 132 (*)     Chloride 95 (*)     Glucose 160 (*)     Albumin/Globulin Ratio 1.0 (*)     Alkaline Phosphatase 147 (*)     All other components within normal limits    Narrative:     Performed at:  [facility] [address],  [employer] [facility] [place]   Phone [phone]   URINE RT REFLEX TO CULTURE - Abnormal; Notable for the following components:    Blood, Urine SMALL (*)     Leukocyte Esterase, Urine SMALL (*)     All other components within normal limits    Narrative:     Performed at:  [facility] [address],  [employer], [facility]   Phone [phone]   MICROSCOPIC URINALYSIS - Abnormal; Notable for the following components:    Mucus, UA 2+ (*)     WBC, UA 6-9 (*)     Epithelial Cells, UA 21-50 (*)     Bacteria, UA Rare (*)     All other components within normal limits    Narrative:     Performed at:  [place] [address],  [employer], [facility] [place] [facility]   Phone [phone]   CULTURE, URINE   CBC WITH AUTO DIFFERENTIAL    Narrative:     Performed at:  [facility] [address],  [employer], [place]   Phone [phone]   TROPONIN    Narrative:     Performed at:  [facility]) [facility] [address],  [place], [place]   Phone [phone]       All other labs were within normal range or notreturned as of this dictation. EKG:  All EKG's are interpreted by the Emergency Department Physician who either signs or Co-signs this chart in the absence of a cardiologist.  Please see their note for identified. SINUSES: The visualized paranasal sinuses and mastoid air cells are clear. SOFT TISSUES/SKULL: The calvarium is intact. Extracranial soft tissues are unremarkable. CERVICAL SPINE CT: BONES/ALIGNMENT: No acute fracture or traumatic malalignment is seen. DEGENERATIVE CHANGES: Multilevel degenerative disc and facet disease noted. No high-grade central canal stenosis is found. SOFT TISSUES: Prevertebral soft tissues are unremarkable for the patient's age. Visualized lungs are clear. Head CT: No acute intracranial abnormality detected. Cervical spine CT: No acute abnormality detected. Ct Cervical Spine Wo Contrast    Result Date: 6/23/2020  EXAMINATION: CT OF THE HEAD WITHOUT CONTRAST; CT OF THE CERVICAL SPINE WITHOUT CONTRAST 6/23/2020 5:55 pm TECHNIQUE: CT of the head was performed without the administration of intravenous contrast. Dose modulation, iterative reconstruction, and/or weight based adjustment of the mA/kV was utilized to reduce the radiation dose to as low as reasonably achievable.; CT of the cervical spine was performed without the administration of intravenous contrast. Multiplanar reformatted images are provided for review. Dose modulation, iterative reconstruction, and/or weight based adjustment of the mA/kV was utilized to reduce the radiation dose to as low as reasonably achievable. COMPARISON: None. HISTORY: ORDERING SYSTEM PROVIDED HISTORY: fall TECHNOLOGIST PROVIDED HISTORY: If patient is on cardiac monitor and/or pulse ox, they may be taken off cardiac monitor and pulse ox, left on O2 if currently on. All monitors reattached when patient returns to room. Has a \"code stroke\" or \"stroke alert\" been called? ->No Reason for exam:->fall Reason for Exam: Pt fell today, c/o pain Acuity: Acute Type of Exam: Initial FINDINGS: HEAD CT: BRAIN/VENTRICLES:  No acute loss of the gray-white matter differentiation is identified to suggest acute or subacute infarct.   There is encephalomalacia within the right corona radiata and right basal ganglia. No masses or hemorrhages within the brain parenchyma are found. No evidence of midline shift. There is moderate to severe periventricular low-attenuation, compatible with chronic small vessel ischemic disease. The intracranial vasculature, including the dural venous sinuses, is within normal limits. ORBITS: No acute orbital abnormalities are identified. SINUSES: The visualized paranasal sinuses and mastoid air cells are clear. SOFT TISSUES/SKULL: The calvarium is intact. Extracranial soft tissues are unremarkable. CERVICAL SPINE CT: BONES/ALIGNMENT: No acute fracture or traumatic malalignment is seen. DEGENERATIVE CHANGES: Multilevel degenerative disc and facet disease noted. No high-grade central canal stenosis is found. SOFT TISSUES: Prevertebral soft tissues are unremarkable for the patient's age. Visualized lungs are clear. Head CT: No acute intracranial abnormality detected. Cervical spine CT: No acute abnormality detected. PROCEDURES   Unless otherwise noted below, none     Procedures    CRITICAL CARE TIME   N/A    CONSULTS:  None      EMERGENCY DEPARTMENT COURSE and DIFFERENTIAL DIAGNOSIS/MDM:   Vitals:    Vitals:    06/23/20 2134 06/23/20 2145 06/23/20 2204 06/23/20 2327   BP: 138/65  (!) 150/69 129/81   Pulse: 77 87 76 70   Resp: 15 17 21 16   Temp:    98.4 °F (36.9 °C)   TempSrc:       SpO2: 95% 90% 94% 99%   Weight:       Height:           Patient was given the following medications:  Medications - No data to display    Patient was seen and evaluated by myself and Dr. Jonny Bull. Patient here for complaints of a fall that occurred yesterday. Patient states that she was outside getting her cat and dog when she suspects that they knocked her over. Patient states she fell striking her head. She also complains of right shoulder pain. On exam she is awake and alert hemodynamically stable nontoxic in appearance.   She is neurologically

## 2020-06-25 ENCOUNTER — HOSPITAL ENCOUNTER (EMERGENCY)
Age: 80
Discharge: HOME | End: 2020-06-25
Payer: MEDICARE

## 2020-06-25 VITALS
HEART RATE: 86 BPM | HEIGHT: 60 IN | WEIGHT: 130 LBS | RESPIRATION RATE: 16 BRPM | BODY MASS INDEX: 25.52 KG/M2 | SYSTOLIC BLOOD PRESSURE: 145 MMHG | DIASTOLIC BLOOD PRESSURE: 80 MMHG | OXYGEN SATURATION: 96 % | TEMPERATURE: 98.3 F

## 2020-06-25 LAB
A/G RATIO: 1 (ref 1.1–2.2)
ALBUMIN SERPL-MCNC: 3.9 G/DL (ref 3.4–5)
ALP BLD-CCNC: 148 U/L (ref 40–129)
ALT SERPL-CCNC: 14 U/L (ref 10–40)
ANION GAP SERPL CALCULATED.3IONS-SCNC: 13 MMOL/L (ref 3–16)
AST SERPL-CCNC: 30 U/L (ref 15–37)
BASOPHILS ABSOLUTE: 0.1 K/UL (ref 0–0.2)
BASOPHILS RELATIVE PERCENT: 0.8 %
BILIRUB SERPL-MCNC: 0.5 MG/DL (ref 0–1)
BUN BLDV-MCNC: 13 MG/DL (ref 7–20)
CALCIUM SERPL-MCNC: 9.3 MG/DL (ref 8.3–10.6)
CHLORIDE BLD-SCNC: 96 MMOL/L (ref 99–110)
CO2: 26 MMOL/L (ref 21–32)
CREAT SERPL-MCNC: 0.7 MG/DL (ref 0.6–1.2)
EOSINOPHILS ABSOLUTE: 0 K/UL (ref 0–0.6)
EOSINOPHILS RELATIVE PERCENT: 0.1 %
GFR AFRICAN AMERICAN: >60
GFR NON-AFRICAN AMERICAN: >60
GLOBULIN: 3.8 G/DL
GLUCOSE BLD-MCNC: 152 MG/DL (ref 70–99)
HCT VFR BLD CALC: 44.9 % (ref 36–48)
HEMOGLOBIN: 14.7 G/DL (ref 12–16)
LYMPHOCYTES ABSOLUTE: 1.9 K/UL (ref 1–5.1)
LYMPHOCYTES RELATIVE PERCENT: 20.7 %
MCH RBC QN AUTO: 30.1 PG (ref 26–34)
MCHC RBC AUTO-ENTMCNC: 32.8 G/DL (ref 31–36)
MCV RBC AUTO: 91.7 FL (ref 80–100)
MONOCYTES ABSOLUTE: 1 K/UL (ref 0–1.3)
MONOCYTES RELATIVE PERCENT: 11 %
NEUTROPHILS ABSOLUTE: 6.3 K/UL (ref 1.7–7.7)
NEUTROPHILS RELATIVE PERCENT: 67.4 %
PDW BLD-RTO: 13.8 % (ref 12.4–15.4)
PLATELET # BLD: 267 K/UL (ref 135–450)
PMV BLD AUTO: 8.9 FL (ref 5–10.5)
POTASSIUM REFLEX MAGNESIUM: 4 MMOL/L (ref 3.5–5.1)
RBC # BLD: 4.89 M/UL (ref 4–5.2)
SODIUM BLD-SCNC: 135 MMOL/L (ref 136–145)
TOTAL PROTEIN: 7.7 G/DL (ref 6.4–8.2)
WBC # BLD: 9.4 K/UL (ref 4–11)

## 2020-06-25 PROCEDURE — 6370000000 HC RX 637 (ALT 250 FOR IP): Performed by: NURSE PRACTITIONER

## 2020-06-25 PROCEDURE — 96375 TX/PRO/DX INJ NEW DRUG ADDON: CPT

## 2020-06-25 PROCEDURE — 85025 COMPLETE CBC W/AUTO DIFF WBC: CPT

## 2020-06-25 PROCEDURE — 99283 EMERGENCY DEPT VISIT LOW MDM: CPT

## 2020-06-25 PROCEDURE — 80053 COMPREHEN METABOLIC PANEL: CPT

## 2020-06-25 PROCEDURE — 96374 THER/PROPH/DIAG INJ IV PUSH: CPT

## 2020-06-25 PROCEDURE — 6360000002 HC RX W HCPCS: Performed by: NURSE PRACTITIONER

## 2020-06-25 RX ORDER — PREDNISONE 20 MG/1
TABLET ORAL
Qty: 18 TABLET | Refills: 0 | Status: ON HOLD | OUTPATIENT
Start: 2020-06-25 | End: 2021-12-19 | Stop reason: HOSPADM

## 2020-06-25 RX ORDER — DIPHENHYDRAMINE HYDROCHLORIDE 50 MG/ML
12.5 INJECTION INTRAMUSCULAR; INTRAVENOUS ONCE
Status: COMPLETED | OUTPATIENT
Start: 2020-06-25 | End: 2020-06-25

## 2020-06-25 RX ORDER — HYDROXYZINE HYDROCHLORIDE 25 MG/1
25 TABLET, FILM COATED ORAL EVERY 8 HOURS PRN
Qty: 15 TABLET | Refills: 0 | Status: SHIPPED | OUTPATIENT
Start: 2020-06-25 | End: 2020-06-30

## 2020-06-25 RX ORDER — RANITIDINE 150 MG/1
150 TABLET ORAL 2 TIMES DAILY
Qty: 10 TABLET | Refills: 0 | Status: SHIPPED | OUTPATIENT
Start: 2020-06-25 | End: 2020-06-30

## 2020-06-25 RX ORDER — RANITIDINE 15 MG/ML
150 SOLUTION ORAL ONCE
Status: COMPLETED | OUTPATIENT
Start: 2020-06-25 | End: 2020-06-25

## 2020-06-25 RX ORDER — METHYLPREDNISOLONE SODIUM SUCCINATE 125 MG/2ML
80 INJECTION, POWDER, LYOPHILIZED, FOR SOLUTION INTRAMUSCULAR; INTRAVENOUS ONCE
Status: COMPLETED | OUTPATIENT
Start: 2020-06-25 | End: 2020-06-25

## 2020-06-25 RX ADMIN — DIPHENHYDRAMINE HYDROCHLORIDE 12.5 MG: 50 INJECTION INTRAMUSCULAR; INTRAVENOUS at 12:43

## 2020-06-25 RX ADMIN — RANITIDINE 150 MG: 15 SYRUP ORAL at 13:17

## 2020-06-25 RX ADMIN — METHYLPREDNISOLONE SODIUM SUCCINATE 80 MG: 125 INJECTION, POWDER, FOR SOLUTION INTRAMUSCULAR; INTRAVENOUS at 12:45

## 2020-06-25 ASSESSMENT — ENCOUNTER SYMPTOMS
SORE THROAT: 0
COLOR CHANGE: 0
SHORTNESS OF BREATH: 0
RHINORRHEA: 0
ABDOMINAL PAIN: 0

## 2020-06-25 ASSESSMENT — PAIN SCALES - GENERAL: PAINLEVEL_OUTOF10: 3

## 2020-06-25 ASSESSMENT — PAIN DESCRIPTION - PAIN TYPE: TYPE: ACUTE PAIN

## 2020-06-25 ASSESSMENT — PAIN DESCRIPTION - LOCATION: LOCATION: HEAD

## 2020-06-25 NOTE — ED PROVIDER NOTES
Exam  Constitutional:       Appearance: She is well-developed. HENT:      Head: Normocephalic. Comments: No additional rash to the trunk or extremities. Neck:      Musculoskeletal: Normal range of motion. Cardiovascular:      Rate and Rhythm: Normal rate. Pulmonary:      Effort: Pulmonary effort is normal. No respiratory distress. Abdominal:      General: There is no distension. Palpations: Abdomen is soft. Musculoskeletal: Normal range of motion. Skin:     General: Skin is warm and dry. Neurological:      Mental Status: She is alert and oriented to person, place, and time. DIAGNOSTIC RESULTS   LABS:    Labs Reviewed   COMPREHENSIVE METABOLIC PANEL W/ REFLEX TO MG FOR LOW K - Abnormal; Notable for the following components:       Result Value    Sodium 135 (*)     Chloride 96 (*)     Glucose 152 (*)     Albumin/Globulin Ratio 1.0 (*)     Alkaline Phosphatase 148 (*)     All other components within normal limits    Narrative:     Performed at:  [facility] [address],  [place], [facility]   Phone [phone]   CBC WITH AUTO DIFFERENTIAL    Narrative:     Performed at:  [facility]) [facility] [address],  [place], [facility]   Phone [phone]       All other labs werewithin normal range or not returned as of this dictation. EKG: All EKG's are interpreted by the Emergency Department Physician who either signs or Co-signs this chart in the absence of acardiologist.  Please see their note for interpretation of EKG. RADIOLOGY:           Interpretation per the Radiologist below, if available at the time of this note:    No orders to display     No results found.       PROCEDURES   Unless otherwise noted below, none     Procedures     CRITICAL CARE TIME   N/A    CONSULTS:  None      EMERGENCYDEPARTMENT COURSE and DIFFERENTIAL DIAGNOSIS/MDM:   Vitals:    Vitals:    06/25/20 1226 06/25/20 1317 06/25/20 1513 DISPOSITION Decision To Discharge 06/25/2020 02:45:17 PM      PATIENT REFERRED TO:  Mckinley Ni MD  [address] [place] [address]  [phone]    Schedule an appointment as soon as possible for a visit in 2 days  for re-evaluation    [place] [facility]  [address] [facility]  [phone]    If symptoms worsen      DISCHARGE MEDICATIONS:  Discharge Medication List as of 6/25/2020  2:57 PM      START taking these medications    Details   predniSONE (DELTASONE) 20 MG tablet Take 3 tabs orally daily for 3 days, then take 2 tabs orally for 3 days then take 1 tab orally for 3 days. , Disp-18 tablet, R-0Print      raNITIdine (ZANTAC) 150 MG tablet Take 1 tablet by mouth 2 times daily for 5 days, Disp-10 tablet, R-0Print      hydrOXYzine (ATARAX) 25 MG tablet Take 1 tablet by mouth every 8 hours as needed for Itching, Disp-15 tablet, R-0Print             DISCONTINUED MEDICATIONS:  Discharge Medication List as of 6/25/2020  2:57 PM                 (Please note that portions of this note were completed with a voice recognition program.  Efforts were made to edit the dictations but occasionally words are mis-transcribed.)    AGAPITO Brito CNP (electronically signed)         AGAPITO Brito CNP  06/25/20 3584

## 2021-06-21 ENCOUNTER — APPOINTMENT (OUTPATIENT)
Dept: GENERAL RADIOLOGY | Facility: HOSPITAL | Age: 81
End: 2021-06-21

## 2021-06-21 ENCOUNTER — HOSPITAL ENCOUNTER (EMERGENCY)
Facility: HOSPITAL | Age: 81
Discharge: HOME | End: 2021-06-21
Attending: EMERGENCY MEDICINE | Admitting: EMERGENCY MEDICINE

## 2021-06-21 ENCOUNTER — APPOINTMENT (OUTPATIENT)
Dept: CT IMAGING | Facility: HOSPITAL | Age: 81
End: 2021-06-21

## 2021-06-21 VITALS
WEIGHT: 130.29 LBS | HEART RATE: 62 BPM | SYSTOLIC BLOOD PRESSURE: 138 MMHG | OXYGEN SATURATION: 96 % | RESPIRATION RATE: 17 BRPM | BODY MASS INDEX: 25.58 KG/M2 | HEIGHT: 60 IN | TEMPERATURE: 98 F | DIASTOLIC BLOOD PRESSURE: 72 MMHG

## 2021-06-21 DIAGNOSIS — T50.905A ADVERSE EFFECT OF DRUG, INITIAL ENCOUNTER: Primary | ICD-10-CM

## 2021-06-21 DIAGNOSIS — N30.00 ACUTE CYSTITIS WITHOUT HEMATURIA: ICD-10-CM

## 2021-06-21 LAB
ABO GROUP BLD: NORMAL
ALBUMIN SERPL-MCNC: 3.8 G/DL (ref 3.5–5.2)
ALBUMIN/GLOB SERPL: 1.2 G/DL
ALP SERPL-CCNC: 155 U/L (ref 39–117)
ALT SERPL W P-5'-P-CCNC: 12 U/L (ref 1–33)
ANION GAP SERPL CALCULATED.3IONS-SCNC: 9.5 MMOL/L (ref 5–15)
AST SERPL-CCNC: 29 U/L (ref 1–32)
BACTERIA UR QL AUTO: ABNORMAL /HPF
BASOPHILS # BLD AUTO: 0.1 10*3/MM3 (ref 0–0.2)
BASOPHILS NFR BLD AUTO: 1.1 % (ref 0–1.5)
BILIRUB SERPL-MCNC: 0.3 MG/DL (ref 0–1.2)
BILIRUB UR QL STRIP: NEGATIVE
BLD GP AB SCN SERPL QL: NEGATIVE
BUN SERPL-MCNC: 22 MG/DL (ref 8–23)
BUN/CREAT SERPL: 16.4 (ref 7–25)
CALCIUM SPEC-SCNC: 9.3 MG/DL (ref 8.6–10.5)
CHLORIDE SERPL-SCNC: 101 MMOL/L (ref 98–107)
CLARITY UR: CLEAR
CO2 SERPL-SCNC: 28.5 MMOL/L (ref 22–29)
COLOR UR: YELLOW
CREAT SERPL-MCNC: 1.34 MG/DL (ref 0.57–1)
DEPRECATED RDW RBC AUTO: 41.1 FL (ref 37–54)
EOSINOPHIL # BLD AUTO: 0.28 10*3/MM3 (ref 0–0.4)
EOSINOPHIL NFR BLD AUTO: 3 % (ref 0.3–6.2)
ERYTHROCYTE [DISTWIDTH] IN BLOOD BY AUTOMATED COUNT: 12.3 % (ref 12.3–15.4)
GFR SERPL CREATININE-BSD FRML MDRD: 38 ML/MIN/1.73
GFR SERPL CREATININE-BSD FRML MDRD: 46 ML/MIN/1.73
GLOBULIN UR ELPH-MCNC: 3.3 GM/DL
GLUCOSE BLDC GLUCOMTR-MCNC: 90 MG/DL (ref 70–130)
GLUCOSE SERPL-MCNC: 96 MG/DL (ref 65–99)
GLUCOSE UR STRIP-MCNC: NEGATIVE MG/DL
HCT VFR BLD AUTO: 41.5 % (ref 34–46.6)
HGB BLD-MCNC: 13.6 G/DL (ref 12–15.9)
HGB UR QL STRIP.AUTO: NEGATIVE
HOLD SPECIMEN: NORMAL
HOLD SPECIMEN: NORMAL
HYALINE CASTS UR QL AUTO: ABNORMAL /LPF
IMM GRANULOCYTES # BLD AUTO: 0.02 10*3/MM3 (ref 0–0.05)
IMM GRANULOCYTES NFR BLD AUTO: 0.2 % (ref 0–0.5)
INR PPP: 0.89 (ref 2–3)
KETONES UR QL STRIP: NEGATIVE
LEUKOCYTE ESTERASE UR QL STRIP.AUTO: ABNORMAL
LYMPHOCYTES # BLD AUTO: 2.12 10*3/MM3 (ref 0.7–3.1)
LYMPHOCYTES NFR BLD AUTO: 22.9 % (ref 19.6–45.3)
MCH RBC QN AUTO: 30 PG (ref 26.6–33)
MCHC RBC AUTO-ENTMCNC: 32.8 G/DL (ref 31.5–35.7)
MCV RBC AUTO: 91.6 FL (ref 79–97)
MONOCYTES # BLD AUTO: 0.68 10*3/MM3 (ref 0.1–0.9)
MONOCYTES NFR BLD AUTO: 7.3 % (ref 5–12)
NEUTROPHILS NFR BLD AUTO: 6.07 10*3/MM3 (ref 1.7–7)
NEUTROPHILS NFR BLD AUTO: 65.5 % (ref 42.7–76)
NITRITE UR QL STRIP: NEGATIVE
NRBC BLD AUTO-RTO: 0 /100 WBC (ref 0–0.2)
PH UR STRIP.AUTO: 6 [PH] (ref 5–8)
PLATELET # BLD AUTO: 271 10*3/MM3 (ref 140–450)
PMV BLD AUTO: 10.2 FL (ref 6–12)
POTASSIUM SERPL-SCNC: 3.9 MMOL/L (ref 3.5–5.2)
PROT SERPL-MCNC: 7.1 G/DL (ref 6–8.5)
PROT UR QL STRIP: NEGATIVE
PROTHROMBIN TIME: 9.9 SECONDS (ref 9.4–12)
QT INTERVAL: 474 MS
RBC # BLD AUTO: 4.53 10*6/MM3 (ref 3.77–5.28)
RBC # UR: ABNORMAL /HPF
REF LAB TEST METHOD: ABNORMAL
RH BLD: POSITIVE
SODIUM SERPL-SCNC: 139 MMOL/L (ref 136–145)
SP GR UR STRIP: 1.01 (ref 1–1.03)
SQUAMOUS #/AREA URNS HPF: ABNORMAL /HPF
T&S EXPIRATION DATE: NORMAL
UROBILINOGEN UR QL STRIP: ABNORMAL
WBC # BLD AUTO: 9.27 10*3/MM3 (ref 3.4–10.8)
WBC UR QL AUTO: ABNORMAL /HPF
WHOLE BLOOD HOLD SPECIMEN: NORMAL

## 2021-06-21 PROCEDURE — 82962 GLUCOSE BLOOD TEST: CPT

## 2021-06-21 PROCEDURE — 36415 COLL VENOUS BLD VENIPUNCTURE: CPT | Performed by: EMERGENCY MEDICINE

## 2021-06-21 PROCEDURE — 85610 PROTHROMBIN TIME: CPT | Performed by: EMERGENCY MEDICINE

## 2021-06-21 PROCEDURE — 86901 BLOOD TYPING SEROLOGIC RH(D): CPT | Performed by: EMERGENCY MEDICINE

## 2021-06-21 PROCEDURE — 70450 CT HEAD/BRAIN W/O DYE: CPT

## 2021-06-21 PROCEDURE — 71045 X-RAY EXAM CHEST 1 VIEW: CPT

## 2021-06-21 PROCEDURE — 80053 COMPREHEN METABOLIC PANEL: CPT | Performed by: EMERGENCY MEDICINE

## 2021-06-21 PROCEDURE — 93005 ELECTROCARDIOGRAM TRACING: CPT | Performed by: EMERGENCY MEDICINE

## 2021-06-21 PROCEDURE — 99283 EMERGENCY DEPT VISIT LOW MDM: CPT

## 2021-06-21 PROCEDURE — 81001 URINALYSIS AUTO W/SCOPE: CPT | Performed by: EMERGENCY MEDICINE

## 2021-06-21 PROCEDURE — 86850 RBC ANTIBODY SCREEN: CPT | Performed by: EMERGENCY MEDICINE

## 2021-06-21 PROCEDURE — 85025 COMPLETE CBC W/AUTO DIFF WBC: CPT | Performed by: EMERGENCY MEDICINE

## 2021-06-21 PROCEDURE — 86900 BLOOD TYPING SEROLOGIC ABO: CPT | Performed by: EMERGENCY MEDICINE

## 2021-06-21 RX ORDER — CEPHALEXIN 500 MG/1
500 CAPSULE ORAL 3 TIMES DAILY
Qty: 21 CAPSULE | Refills: 0 | Status: SHIPPED | OUTPATIENT
Start: 2021-06-21

## 2021-06-21 RX ORDER — SODIUM CHLORIDE 0.9 % (FLUSH) 0.9 %
10 SYRINGE (ML) INJECTION AS NEEDED
Status: DISCONTINUED | OUTPATIENT
Start: 2021-06-21 | End: 2021-06-21 | Stop reason: HOSPADM

## 2021-06-21 RX ORDER — CEPHALEXIN 250 MG/1
500 CAPSULE ORAL ONCE
Status: COMPLETED | OUTPATIENT
Start: 2021-06-21 | End: 2021-06-21

## 2021-06-21 RX ADMIN — CEPHALEXIN 500 MG: 250 CAPSULE ORAL at 16:54

## 2021-06-21 NOTE — ED PROVIDER NOTES
"Subjective   Patient is an 81-yo female who presents to the ED with c/o altered mental status that started this morning. Pt family member reports that pt is a \"light-weight\" and is more sleepy than usual. Pt family member thinks that it may be one of the medications she has been taking. Pt did start a new medication 10 days ago.     Pt has a history of dementia. Pt has had a previous stroke.       History provided by:  Relative and patient   used: No        Review of Systems   Constitutional: Negative for chills and fever.   Cardiovascular: Negative for chest pain.   Gastrointestinal: Negative for diarrhea, nausea and vomiting.   All other systems reviewed and are negative.      History reviewed. No pertinent past medical history.    Allergies   Allergen Reactions   • Penicillins Unknown - Low Severity   • Sulfa Antibiotics Hives       History reviewed. No pertinent surgical history.    History reviewed. No pertinent family history.    Social History     Socioeconomic History   • Marital status: Single     Spouse name: Not on file   • Number of children: Not on file   • Years of education: Not on file   • Highest education level: Not on file   Tobacco Use   • Smoking status: Never Smoker   • Smokeless tobacco: Never Used   Vaping Use   • Vaping Use: Never used   Substance and Sexual Activity   • Alcohol use: Never   • Drug use: Never   • Sexual activity: Defer         Objective   Physical Exam  Vitals and nursing note reviewed.   Constitutional:       General: She is not in acute distress.  HENT:      Head: Normocephalic and atraumatic.      Nose: Nose normal.      Mouth/Throat:      Mouth: Mucous membranes are moist.   Eyes:      General: No scleral icterus.     Pupils: Pupils are equal, round, and reactive to light.   Cardiovascular:      Rate and Rhythm: Normal rate and regular rhythm.      Heart sounds: Normal heart sounds. No murmur heard.     Pulmonary:      Effort: Pulmonary effort is " normal. No respiratory distress.      Breath sounds: Normal breath sounds and air entry. No decreased breath sounds, wheezing, rhonchi or rales.   Abdominal:      Palpations: Abdomen is soft.      Tenderness: There is no abdominal tenderness. There is no guarding or rebound.      Hernia: No hernia is present.   Musculoskeletal:         General: No tenderness. Normal range of motion.      Cervical back: Normal range of motion and neck supple. No tenderness.      Right lower leg: No edema.      Left lower leg: No edema.   Skin:     General: Skin is warm and dry.   Neurological:      General: No focal deficit present.      Mental Status: She is oriented to person, place, and time and easily aroused. Mental status is at baseline. She is lethargic.      Cranial Nerves: Cranial nerves are intact. No facial asymmetry.      Sensory: Sensation is intact. No sensory deficit.      Motor: Motor function is intact. No weakness.      Comments: Somnolent, but easily aroused.    Psychiatric:         Speech: Speech is not slurred.         Behavior: Behavior normal.         Procedures         ED Course  ED Course as of Jun 21 1627   Mon Jun 21, 2021   1308 EKG: normal EKG, normal sinus rhythm rate 63, normal P waves, LVH, there are no previous tracings available for comparison, normal sinus rhythm, mild ST depression in V3 V4 V5 V6 II III aVF.    [JW]      ED Course User Index  [JW] Erin Beckford                                        This patient is a pleasant 81-year-old female recently started on Aricept medication this week who has been having insomnia followed by daytime fatigue.    CT of her head was negative for any bleed and neurologic exam is normal here although she appears somnolent during the daytime.    She has a steady gait without ataxia and no neuro deficits such as concerning for stroke today.    All of her blood work is reassuring but her urinalysis is positive for UTI and I gave her IV Rocephin antibiotic  here and will transition to oral Keflex antibiotics this week.    In addition I think she is having an adverse medication reaction to her Aricept and I will have her discontinue and follow-up with her doctor.    MDM    For my differential diagnosis in this patient with altered mental status, I considered low blood sugar, head injury, alcohol intoxication, substance abuse, toxic or metabolic encephalopathy, less likely seizure.      Final diagnoses:   Adverse effect of drug, initial encounter   Acute cystitis without hematuria       Documentation assistance provided by isidro Beckford.  Information recorded by the isidro was done at my direction and has been verified and validated by me.     Erin Beckford  06/21/21 1338       Erin Beckford  06/21/21 1338       Erin Beckford  06/21/21 1335       Kasi Esquivel MD  06/21/21 1814

## 2021-06-21 NOTE — DISCHARGE INSTRUCTIONS
Your blood work and CT of the head came back normal today and no signs of a stroke were seen.    However, you do have a urinary tract infection for which she did take antibiotics this week.    It also looks like your Aricept medication can be causing you to have insomnia, followed by fatigue during the day.

## 2021-06-21 NOTE — ED TRIAGE NOTES
AMS, dizziness, slurred speech upon waking this am.  LKW last night 1230am per family. Pt presents A&OX4, resp evn and NL, NAD.

## 2021-12-17 ENCOUNTER — HOSPITAL ENCOUNTER (INPATIENT)
Age: 81
LOS: 4 days | Discharge: NURSING, REHAB OR LONG-TERM CARE | DRG: 522 | End: 2021-12-21
Attending: EMERGENCY MEDICINE | Admitting: INTERNAL MEDICINE
Payer: MEDICARE

## 2021-12-17 ENCOUNTER — APPOINTMENT (OUTPATIENT)
Dept: GENERAL RADIOLOGY | Age: 81
DRG: 522 | End: 2021-12-17
Payer: MEDICARE

## 2021-12-17 ENCOUNTER — ANESTHESIA (OUTPATIENT)
Dept: OPERATING ROOM | Age: 81
DRG: 522 | End: 2021-12-17
Payer: MEDICARE

## 2021-12-17 ENCOUNTER — ANESTHESIA EVENT (OUTPATIENT)
Dept: OPERATING ROOM | Age: 81
DRG: 522 | End: 2021-12-17
Payer: MEDICARE

## 2021-12-17 VITALS — OXYGEN SATURATION: 81 % | DIASTOLIC BLOOD PRESSURE: 59 MMHG | SYSTOLIC BLOOD PRESSURE: 129 MMHG

## 2021-12-17 DIAGNOSIS — S72.002A LEFT DISPLACED FEMORAL NECK FRACTURE (HCC): Primary | ICD-10-CM

## 2021-12-17 DIAGNOSIS — S99.912A INJURY OF LEFT ANKLE, INITIAL ENCOUNTER: ICD-10-CM

## 2021-12-17 DIAGNOSIS — M25.552 LEFT HIP PAIN: ICD-10-CM
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
## 2021-12-17 DIAGNOSIS — S59.902A ELBOW INJURY, LEFT, INITIAL ENCOUNTER: ICD-10-CM

## 2021-12-17 PROBLEM — I71.40 AAA (ABDOMINAL AORTIC ANEURYSM): Status: ACTIVE | Noted: 2021-12-17

## 2021-12-17 PROBLEM — S72.002P: Status: ACTIVE | Noted: 2021-12-17

## 2021-12-17 PROBLEM — J44.9 COPD (CHRONIC OBSTRUCTIVE PULMONARY DISEASE) (HCC): Status: ACTIVE | Noted: 2021-12-17

## 2021-12-17 PROBLEM — I35.0 MILD AORTIC STENOSIS: Status: ACTIVE | Noted: 2021-12-17

## 2021-12-17 PROBLEM — I10 HYPERTENSION: Status: ACTIVE | Noted: 2021-12-17

## 2021-12-17 PROBLEM — E78.5 HYPERLIPIDEMIA: Status: ACTIVE | Noted: 2021-12-17

## 2021-12-17 PROBLEM — Z86.73 HISTORY OF CVA (CEREBROVASCULAR ACCIDENT): Status: ACTIVE | Noted: 2021-12-17

## 2021-12-17 LAB
A/G RATIO: 1.2 (ref 1.1–2.2)
ALBUMIN SERPL-MCNC: 3.7 G/DL (ref 3.4–5)
ALP BLD-CCNC: 136 U/L (ref 40–129)
ALT SERPL-CCNC: 20 U/L (ref 10–40)
ANION GAP SERPL CALCULATED.3IONS-SCNC: 7 MMOL/L (ref 3–16)
AST SERPL-CCNC: 30 U/L (ref 15–37)
BASOPHILS ABSOLUTE: 0.1 K/UL (ref 0–0.2)
BASOPHILS RELATIVE PERCENT: 0.3 %
BILIRUB SERPL-MCNC: 0.3 MG/DL (ref 0–1)
BUN BLDV-MCNC: 32 MG/DL (ref 7–20)
CALCIUM SERPL-MCNC: 9.2 MG/DL (ref 8.3–10.6)
CHLORIDE BLD-SCNC: 97 MMOL/L (ref 99–110)
CO2: 34 MMOL/L (ref 21–32)
CREAT SERPL-MCNC: 1.2 MG/DL (ref 0.6–1.2)
EKG ATRIAL RATE: 72 BPM
EKG DIAGNOSIS: NORMAL
EKG P AXIS: 62 DEGREES
EKG P-R INTERVAL: 198 MS
EKG Q-T INTERVAL: 472 MS
EKG QRS DURATION: 106 MS
EKG QTC CALCULATION (BAZETT): 516 MS
EKG R AXIS: -16 DEGREES
EKG T AXIS: 56 DEGREES
EKG VENTRICULAR RATE: 72 BPM
EOSINOPHILS ABSOLUTE: 0.3 K/UL (ref 0–0.6)
EOSINOPHILS RELATIVE PERCENT: 1.6 %
GFR AFRICAN AMERICAN: 52
GFR NON-AFRICAN AMERICAN: 43
GLUCOSE BLD-MCNC: 102 MG/DL (ref 70–99)
HCT VFR BLD CALC: 40.8 % (ref 36–48)
HEMOGLOBIN: 13.2 G/DL (ref 12–16)
LYMPHOCYTES ABSOLUTE: 2.1 K/UL (ref 1–5.1)
LYMPHOCYTES RELATIVE PERCENT: 11.9 %
MAGNESIUM: 2.1 MG/DL (ref 1.8–2.4)
MCH RBC QN AUTO: 29.2 PG (ref 26–34)
MCHC RBC AUTO-ENTMCNC: 32.5 G/DL (ref 31–36)
MCV RBC AUTO: 89.8 FL (ref 80–100)
MONOCYTES ABSOLUTE: 1 K/UL (ref 0–1.3)
MONOCYTES RELATIVE PERCENT: 5.7 %
NEUTROPHILS ABSOLUTE: 14.4 K/UL (ref 1.7–7.7)
NEUTROPHILS RELATIVE PERCENT: 80.5 %
PDW BLD-RTO: 13.7 % (ref 12.4–15.4)
PLATELET # BLD: 239 K/UL (ref 135–450)
PMV BLD AUTO: 8.5 FL (ref 5–10.5)
POTASSIUM REFLEX MAGNESIUM: 3.3 MMOL/L (ref 3.5–5.1)
RBC # BLD: 4.54 M/UL (ref 4–5.2)
SARS-COV-2, NAAT: NOT DETECTED
SODIUM BLD-SCNC: 138 MMOL/L (ref 136–145)
TOTAL PROTEIN: 6.8 G/DL (ref 6.4–8.2)
WBC # BLD: 17.8 K/UL (ref 4–11)

## 2021-12-17 PROCEDURE — 3700000001 HC ADD 15 MINUTES (ANESTHESIA): Performed by: ORTHOPAEDIC SURGERY

## 2021-12-17 PROCEDURE — 2580000003 HC RX 258: Performed by: ORTHOPAEDIC SURGERY

## 2021-12-17 PROCEDURE — 6360000002 HC RX W HCPCS: Performed by: EMERGENCY MEDICINE

## 2021-12-17 PROCEDURE — 99223 1ST HOSP IP/OBS HIGH 75: CPT | Performed by: INTERNAL MEDICINE

## 2021-12-17 PROCEDURE — 2709999900 HC NON-CHARGEABLE SUPPLY: Performed by: ORTHOPAEDIC SURGERY

## 2021-12-17 PROCEDURE — 80053 COMPREHEN METABOLIC PANEL: CPT

## 2021-12-17 PROCEDURE — 7100000001 HC PACU RECOVERY - ADDTL 15 MIN: Performed by: ORTHOPAEDIC SURGERY

## 2021-12-17 PROCEDURE — 2580000003 HC RX 258: Performed by: NURSE ANESTHETIST, CERTIFIED REGISTERED

## 2021-12-17 PROCEDURE — 76942 ECHO GUIDE FOR BIOPSY: CPT | Performed by: ANESTHESIOLOGY

## 2021-12-17 PROCEDURE — 99221 1ST HOSP IP/OBS SF/LOW 40: CPT | Performed by: ORTHOPAEDIC SURGERY

## 2021-12-17 PROCEDURE — C1713 ANCHOR/SCREW BN/BN,TIS/BN: HCPCS | Performed by: ORTHOPAEDIC SURGERY

## 2021-12-17 PROCEDURE — 6360000002 HC RX W HCPCS: Performed by: ORTHOPAEDIC SURGERY

## 2021-12-17 PROCEDURE — 6360000002 HC RX W HCPCS: Performed by: NURSE ANESTHETIST, CERTIFIED REGISTERED

## 2021-12-17 PROCEDURE — 73502 X-RAY EXAM HIP UNI 2-3 VIEWS: CPT

## 2021-12-17 PROCEDURE — 73600 X-RAY EXAM OF ANKLE: CPT

## 2021-12-17 PROCEDURE — 6360000002 HC RX W HCPCS: Performed by: INTERNAL MEDICINE

## 2021-12-17 PROCEDURE — 2700000000 HC OXYGEN THERAPY PER DAY

## 2021-12-17 PROCEDURE — 94761 N-INVAS EAR/PLS OXIMETRY MLT: CPT

## 2021-12-17 PROCEDURE — 73070 X-RAY EXAM OF ELBOW: CPT

## 2021-12-17 PROCEDURE — 85025 COMPLETE CBC W/AUTO DIFF WBC: CPT

## 2021-12-17 PROCEDURE — 3600000005 HC SURGERY LEVEL 5 BASE: Performed by: ORTHOPAEDIC SURGERY

## 2021-12-17 PROCEDURE — 2580000003 HC RX 258: Performed by: EMERGENCY MEDICINE

## 2021-12-17 PROCEDURE — 96374 THER/PROPH/DIAG INJ IV PUSH: CPT

## 2021-12-17 PROCEDURE — 7100000000 HC PACU RECOVERY - FIRST 15 MIN: Performed by: ORTHOPAEDIC SURGERY

## 2021-12-17 PROCEDURE — 3600000015 HC SURGERY LEVEL 5 ADDTL 15MIN: Performed by: ORTHOPAEDIC SURGERY

## 2021-12-17 PROCEDURE — 87635 SARS-COV-2 COVID-19 AMP PRB: CPT

## 2021-12-17 PROCEDURE — 0SRS0J9 REPLACEMENT OF LEFT HIP JOINT, FEMORAL SURFACE WITH SYNTHETIC SUBSTITUTE, CEMENTED, OPEN APPROACH: ICD-10-PCS | Performed by: ORTHOPAEDIC SURGERY

## 2021-12-17 PROCEDURE — 2500000003 HC RX 250 WO HCPCS: Performed by: NURSE ANESTHETIST, CERTIFIED REGISTERED

## 2021-12-17 PROCEDURE — 2580000003 HC RX 258: Performed by: INTERNAL MEDICINE

## 2021-12-17 PROCEDURE — 2500000003 HC RX 250 WO HCPCS: Performed by: ANESTHESIOLOGY

## 2021-12-17 PROCEDURE — 71045 X-RAY EXAM CHEST 1 VIEW: CPT

## 2021-12-17 PROCEDURE — 6370000000 HC RX 637 (ALT 250 FOR IP): Performed by: ORTHOPAEDIC SURGERY

## 2021-12-17 PROCEDURE — C1776 JOINT DEVICE (IMPLANTABLE): HCPCS | Performed by: ORTHOPAEDIC SURGERY

## 2021-12-17 PROCEDURE — 93005 ELECTROCARDIOGRAM TRACING: CPT | Performed by: EMERGENCY MEDICINE

## 2021-12-17 PROCEDURE — 83735 ASSAY OF MAGNESIUM: CPT

## 2021-12-17 PROCEDURE — 6370000000 HC RX 637 (ALT 250 FOR IP): Performed by: INTERNAL MEDICINE

## 2021-12-17 PROCEDURE — 99285 EMERGENCY DEPT VISIT HI MDM: CPT

## 2021-12-17 PROCEDURE — 3700000000 HC ANESTHESIA ATTENDED CARE: Performed by: ORTHOPAEDIC SURGERY

## 2021-12-17 PROCEDURE — 1200000000 HC SEMI PRIVATE

## 2021-12-17 PROCEDURE — 73552 X-RAY EXAM OF FEMUR 2/>: CPT

## 2021-12-17 PROCEDURE — 93010 ELECTROCARDIOGRAM REPORT: CPT | Performed by: INTERNAL MEDICINE

## 2021-12-17 DEVICE — VERSYS CEM LD/FX SZ 12X125MM: Type: IMPLANTABLE DEVICE | Site: HIP | Status: FUNCTIONAL

## 2021-12-17 DEVICE — BIPOLAR LINER 44/45/46MM OD X 28MM ID: Type: IMPLANTABLE DEVICE | Site: HIP | Status: FUNCTIONAL

## 2021-12-17 DEVICE — VERSYS DISTAL CENTRALIZER 9MM: Type: IMPLANTABLE DEVICE | Site: HIP | Status: FUNCTIONAL

## 2021-12-17 DEVICE — CEMENT BIOPREP ST: Type: IMPLANTABLE DEVICE | Site: HIP | Status: FUNCTIONAL

## 2021-12-17 DEVICE — CEMENT BNE 20ML 41GM FULL DOSE PMMA W/ TOBRA M VISC RADPQ: Type: IMPLANTABLE DEVICE | Site: HIP | Status: FUNCTIONAL

## 2021-12-17 DEVICE — BIPOLAR SHELL 44MM OD: Type: IMPLANTABLE DEVICE | Site: HIP | Status: FUNCTIONAL

## 2021-12-17 RX ORDER — ONDANSETRON 2 MG/ML
INJECTION INTRAMUSCULAR; INTRAVENOUS PRN
Status: DISCONTINUED | OUTPATIENT
Start: 2021-12-17 | End: 2021-12-17 | Stop reason: SDUPTHER

## 2021-12-17 RX ORDER — ONDANSETRON 2 MG/ML
4 INJECTION INTRAMUSCULAR; INTRAVENOUS EVERY 6 HOURS PRN
Status: DISCONTINUED | OUTPATIENT
Start: 2021-12-17 | End: 2021-12-17 | Stop reason: ALTCHOICE

## 2021-12-17 RX ORDER — SODIUM CHLORIDE 9 MG/ML
25 INJECTION, SOLUTION INTRAVENOUS PRN
Status: DISCONTINUED | OUTPATIENT
Start: 2021-12-17 | End: 2021-12-21 | Stop reason: HOSPADM

## 2021-12-17 RX ORDER — ONDANSETRON 2 MG/ML
4 INJECTION INTRAMUSCULAR; INTRAVENOUS EVERY 6 HOURS PRN
Status: DISCONTINUED | OUTPATIENT
Start: 2021-12-17 | End: 2021-12-21 | Stop reason: HOSPADM

## 2021-12-17 RX ORDER — ACETAMINOPHEN 325 MG/1
650 TABLET ORAL EVERY 6 HOURS PRN
Status: DISCONTINUED | OUTPATIENT
Start: 2021-12-17 | End: 2021-12-21 | Stop reason: HOSPADM

## 2021-12-17 RX ORDER — PROPOFOL 10 MG/ML
INJECTION, EMULSION INTRAVENOUS PRN
Status: DISCONTINUED | OUTPATIENT
Start: 2021-12-17 | End: 2021-12-17 | Stop reason: SDUPTHER

## 2021-12-17 RX ORDER — 0.9 % SODIUM CHLORIDE 0.9 %
500 INTRAVENOUS SOLUTION INTRAVENOUS ONCE
Status: COMPLETED | OUTPATIENT
Start: 2021-12-17 | End: 2021-12-17

## 2021-12-17 RX ORDER — LIDOCAINE HYDROCHLORIDE 20 MG/ML
INJECTION, SOLUTION INFILTRATION; PERINEURAL PRN
Status: DISCONTINUED | OUTPATIENT
Start: 2021-12-17 | End: 2021-12-17 | Stop reason: SDUPTHER

## 2021-12-17 RX ORDER — TRAMADOL HYDROCHLORIDE 50 MG/1
50 TABLET ORAL EVERY 6 HOURS PRN
Status: DISCONTINUED | OUTPATIENT
Start: 2021-12-17 | End: 2021-12-21 | Stop reason: HOSPADM

## 2021-12-17 RX ORDER — ASPIRIN 81 MG/1
81 TABLET ORAL DAILY
Status: DISCONTINUED | OUTPATIENT
Start: 2021-12-17 | End: 2021-12-21 | Stop reason: HOSPADM

## 2021-12-17 RX ORDER — OXYCODONE HYDROCHLORIDE 5 MG/1
10 TABLET ORAL EVERY 4 HOURS PRN
Status: DISCONTINUED | OUTPATIENT
Start: 2021-12-17 | End: 2021-12-18

## 2021-12-17 RX ORDER — PROCHLORPERAZINE EDISYLATE 5 MG/ML
10 INJECTION INTRAMUSCULAR; INTRAVENOUS EVERY 6 HOURS PRN
Status: DISCONTINUED | OUTPATIENT
Start: 2021-12-17 | End: 2021-12-21 | Stop reason: HOSPADM

## 2021-12-17 RX ORDER — SODIUM CHLORIDE, SODIUM LACTATE, POTASSIUM CHLORIDE, CALCIUM CHLORIDE 600; 310; 30; 20 MG/100ML; MG/100ML; MG/100ML; MG/100ML
INJECTION, SOLUTION INTRAVENOUS CONTINUOUS PRN
Status: DISCONTINUED | OUTPATIENT
Start: 2021-12-17 | End: 2021-12-17 | Stop reason: SDUPTHER

## 2021-12-17 RX ORDER — ONDANSETRON 4 MG/1
4 TABLET, ORALLY DISINTEGRATING ORAL EVERY 8 HOURS PRN
Status: DISCONTINUED | OUTPATIENT
Start: 2021-12-17 | End: 2021-12-17 | Stop reason: ALTCHOICE

## 2021-12-17 RX ORDER — SODIUM CHLORIDE 0.9 % (FLUSH) 0.9 %
5-40 SYRINGE (ML) INJECTION PRN
Status: DISCONTINUED | OUTPATIENT
Start: 2021-12-17 | End: 2021-12-21 | Stop reason: HOSPADM

## 2021-12-17 RX ORDER — ROCURONIUM BROMIDE 10 MG/ML
INJECTION, SOLUTION INTRAVENOUS PRN
Status: DISCONTINUED | OUTPATIENT
Start: 2021-12-17 | End: 2021-12-17 | Stop reason: SDUPTHER

## 2021-12-17 RX ORDER — POLYETHYLENE GLYCOL 3350 17 G/17G
17 POWDER, FOR SOLUTION ORAL DAILY PRN
Status: DISCONTINUED | OUTPATIENT
Start: 2021-12-17 | End: 2021-12-21 | Stop reason: HOSPADM

## 2021-12-17 RX ORDER — PROMETHAZINE HYDROCHLORIDE 25 MG/1
12.5 TABLET ORAL EVERY 6 HOURS PRN
Status: DISCONTINUED | OUTPATIENT
Start: 2021-12-17 | End: 2021-12-21 | Stop reason: HOSPADM

## 2021-12-17 RX ORDER — SODIUM CHLORIDE 9 MG/ML
INJECTION, SOLUTION INTRAVENOUS CONTINUOUS
Status: DISCONTINUED | OUTPATIENT
Start: 2021-12-17 | End: 2021-12-21 | Stop reason: HOSPADM

## 2021-12-17 RX ORDER — MORPHINE SULFATE 4 MG/ML
4 INJECTION, SOLUTION INTRAMUSCULAR; INTRAVENOUS EVERY 30 MIN PRN
Status: DISCONTINUED | OUTPATIENT
Start: 2021-12-17 | End: 2021-12-17 | Stop reason: ALTCHOICE

## 2021-12-17 RX ORDER — TRANEXAMIC ACID 100 MG/ML
INJECTION, SOLUTION INTRAVENOUS PRN
Status: DISCONTINUED | OUTPATIENT
Start: 2021-12-17 | End: 2021-12-17 | Stop reason: SDUPTHER

## 2021-12-17 RX ORDER — ATORVASTATIN CALCIUM 40 MG/1
40 TABLET, FILM COATED ORAL DAILY
Status: DISCONTINUED | OUTPATIENT
Start: 2021-12-17 | End: 2021-12-18

## 2021-12-17 RX ORDER — BUPIVACAINE HYDROCHLORIDE 2.5 MG/ML
INJECTION, SOLUTION EPIDURAL; INFILTRATION; INTRACAUDAL PRN
Status: DISCONTINUED | OUTPATIENT
Start: 2021-12-17 | End: 2021-12-17 | Stop reason: SDUPTHER

## 2021-12-17 RX ORDER — SODIUM CHLORIDE 0.9 % (FLUSH) 0.9 %
5-40 SYRINGE (ML) INJECTION EVERY 12 HOURS SCHEDULED
Status: DISCONTINUED | OUTPATIENT
Start: 2021-12-17 | End: 2021-12-21 | Stop reason: HOSPADM

## 2021-12-17 RX ORDER — IBUPROFEN 400 MG/1
400 TABLET ORAL EVERY 12 HOURS
Status: DISPENSED | OUTPATIENT
Start: 2021-12-17 | End: 2021-12-20

## 2021-12-17 RX ORDER — DEXAMETHASONE SODIUM PHOSPHATE 4 MG/ML
INJECTION, SOLUTION INTRA-ARTICULAR; INTRALESIONAL; INTRAMUSCULAR; INTRAVENOUS; SOFT TISSUE PRN
Status: DISCONTINUED | OUTPATIENT
Start: 2021-12-17 | End: 2021-12-17 | Stop reason: SDUPTHER

## 2021-12-17 RX ORDER — ACETAMINOPHEN 325 MG/1
650 TABLET ORAL EVERY 4 HOURS PRN
Status: DISCONTINUED | OUTPATIENT
Start: 2021-12-17 | End: 2021-12-21 | Stop reason: HOSPADM

## 2021-12-17 RX ORDER — SODIUM CHLORIDE 0.9 % (FLUSH) 0.9 %
10 SYRINGE (ML) INJECTION EVERY 12 HOURS SCHEDULED
Status: DISCONTINUED | OUTPATIENT
Start: 2021-12-17 | End: 2021-12-21 | Stop reason: HOSPADM

## 2021-12-17 RX ORDER — SODIUM CHLORIDE 0.9 % (FLUSH) 0.9 %
10 SYRINGE (ML) INJECTION PRN
Status: DISCONTINUED | OUTPATIENT
Start: 2021-12-17 | End: 2021-12-21 | Stop reason: HOSPADM

## 2021-12-17 RX ORDER — OXYCODONE HYDROCHLORIDE 5 MG/1
5 TABLET ORAL EVERY 4 HOURS PRN
Status: DISCONTINUED | OUTPATIENT
Start: 2021-12-17 | End: 2021-12-18

## 2021-12-17 RX ORDER — MAGNESIUM HYDROXIDE 1200 MG/15ML
LIQUID ORAL CONTINUOUS PRN
Status: COMPLETED | OUTPATIENT
Start: 2021-12-17 | End: 2021-12-17

## 2021-12-17 RX ORDER — ACETAMINOPHEN 650 MG/1
650 SUPPOSITORY RECTAL EVERY 6 HOURS PRN
Status: DISCONTINUED | OUTPATIENT
Start: 2021-12-17 | End: 2021-12-21 | Stop reason: HOSPADM

## 2021-12-17 RX ADMIN — TRANEXAMIC ACID 1000 MG: 1 INJECTION, SOLUTION INTRAVENOUS at 15:08

## 2021-12-17 RX ADMIN — PHENYLEPHRINE HYDROCHLORIDE 100 MCG: 10 INJECTION INTRAVENOUS at 15:58

## 2021-12-17 RX ADMIN — SODIUM CHLORIDE: 9 INJECTION, SOLUTION INTRAVENOUS at 08:07

## 2021-12-17 RX ADMIN — ROCURONIUM BROMIDE 40 MG: 10 INJECTION, SOLUTION INTRAVENOUS at 14:52

## 2021-12-17 RX ADMIN — DEXAMETHASONE SODIUM PHOSPHATE 4 MG: 4 INJECTION, SOLUTION INTRAMUSCULAR; INTRAVENOUS at 16:16

## 2021-12-17 RX ADMIN — PHENYLEPHRINE HYDROCHLORIDE 50 MCG: 10 INJECTION INTRAVENOUS at 16:19

## 2021-12-17 RX ADMIN — MORPHINE SULFATE 4 MG: 4 INJECTION INTRAVENOUS at 04:38

## 2021-12-17 RX ADMIN — ASPIRIN 81 MG: 81 TABLET, COATED ORAL at 08:29

## 2021-12-17 RX ADMIN — BUPIVACAINE HYDROCHLORIDE 30 ML: 2.5 INJECTION, SOLUTION EPIDURAL; INFILTRATION; INTRACAUDAL; PERINEURAL at 14:15

## 2021-12-17 RX ADMIN — SODIUM CHLORIDE, POTASSIUM CHLORIDE, SODIUM LACTATE AND CALCIUM CHLORIDE: 600; 310; 30; 20 INJECTION, SOLUTION INTRAVENOUS at 16:38

## 2021-12-17 RX ADMIN — ONDANSETRON HYDROCHLORIDE 4 MG: 2 INJECTION, SOLUTION INTRAMUSCULAR; INTRAVENOUS at 15:11

## 2021-12-17 RX ADMIN — SODIUM CHLORIDE, PRESERVATIVE FREE 10 ML: 5 INJECTION INTRAVENOUS at 20:00

## 2021-12-17 RX ADMIN — SODIUM CHLORIDE 500 ML: 9 INJECTION, SOLUTION INTRAVENOUS at 04:37

## 2021-12-17 RX ADMIN — SODIUM CHLORIDE, POTASSIUM CHLORIDE, SODIUM LACTATE AND CALCIUM CHLORIDE: 600; 310; 30; 20 INJECTION, SOLUTION INTRAVENOUS at 14:49

## 2021-12-17 RX ADMIN — PROPOFOL 120 MG: 10 INJECTION, EMULSION INTRAVENOUS at 14:52

## 2021-12-17 RX ADMIN — ENOXAPARIN SODIUM 30 MG: 100 INJECTION SUBCUTANEOUS at 08:30

## 2021-12-17 RX ADMIN — PHENYLEPHRINE HYDROCHLORIDE 100 MCG: 10 INJECTION INTRAVENOUS at 16:01

## 2021-12-17 RX ADMIN — IBUPROFEN 400 MG: 400 TABLET, FILM COATED ORAL at 19:59

## 2021-12-17 RX ADMIN — Medication 2000 MG: at 14:45

## 2021-12-17 RX ADMIN — ATORVASTATIN CALCIUM 40 MG: 40 TABLET, FILM COATED ORAL at 08:30

## 2021-12-17 RX ADMIN — SUGAMMADEX 100 MG: 100 INJECTION, SOLUTION INTRAVENOUS at 16:35

## 2021-12-17 RX ADMIN — TRAMADOL HYDROCHLORIDE 50 MG: 50 TABLET, COATED ORAL at 08:30

## 2021-12-17 RX ADMIN — LIDOCAINE HYDROCHLORIDE 30 MG: 20 INJECTION, SOLUTION INFILTRATION; PERINEURAL at 14:52

## 2021-12-17 RX ADMIN — Medication 2000 MG: at 22:10

## 2021-12-17 ASSESSMENT — PULMONARY FUNCTION TESTS
PIF_VALUE: 17
PIF_VALUE: 17
PIF_VALUE: 15
PIF_VALUE: 17
PIF_VALUE: 18
PIF_VALUE: 17
PIF_VALUE: 17
PIF_VALUE: 15
PIF_VALUE: 17
PIF_VALUE: 18
PIF_VALUE: 17
PIF_VALUE: 16
PIF_VALUE: 2
PIF_VALUE: 17
PIF_VALUE: 16
PIF_VALUE: 17
PIF_VALUE: 18
PIF_VALUE: 17
PIF_VALUE: 17
PIF_VALUE: 2
PIF_VALUE: 17
PIF_VALUE: 1
PIF_VALUE: 18
PIF_VALUE: 17
PIF_VALUE: 30
PIF_VALUE: 17
PIF_VALUE: 16
PIF_VALUE: 17
PIF_VALUE: 17
PIF_VALUE: 20
PIF_VALUE: 18
PIF_VALUE: 17
PIF_VALUE: 17
PIF_VALUE: 27
PIF_VALUE: 17
PIF_VALUE: 2
PIF_VALUE: 17
PIF_VALUE: 19
PIF_VALUE: 17
PIF_VALUE: 19
PIF_VALUE: 17
PIF_VALUE: 16
PIF_VALUE: 1
PIF_VALUE: 17
PIF_VALUE: 17
PIF_VALUE: 9
PIF_VALUE: 17
PIF_VALUE: 7
PIF_VALUE: 17
PIF_VALUE: 18
PIF_VALUE: 28
PIF_VALUE: 3
PIF_VALUE: 17
PIF_VALUE: 17
PIF_VALUE: 18
PIF_VALUE: 17
PIF_VALUE: 18

## 2021-12-17 ASSESSMENT — PAIN DESCRIPTION - ORIENTATION
ORIENTATION: LEFT
ORIENTATION: LEFT

## 2021-12-17 ASSESSMENT — PAIN SCALES - GENERAL
PAINLEVEL_OUTOF10: 0
PAINLEVEL_OUTOF10: 0
PAINLEVEL_OUTOF10: 7
PAINLEVEL_OUTOF10: 10
PAINLEVEL_OUTOF10: 4
PAINLEVEL_OUTOF10: 10
PAINLEVEL_OUTOF10: 7
PAINLEVEL_OUTOF10: 0
PAINLEVEL_OUTOF10: 10

## 2021-12-17 ASSESSMENT — PAIN DESCRIPTION - PAIN TYPE
TYPE: SURGICAL PAIN
TYPE: ACUTE PAIN

## 2021-12-17 ASSESSMENT — PAIN DESCRIPTION - PROGRESSION: CLINICAL_PROGRESSION: GRADUALLY WORSENING

## 2021-12-17 ASSESSMENT — PAIN DESCRIPTION - DESCRIPTORS: DESCRIPTORS: ACHING;DISCOMFORT

## 2021-12-17 ASSESSMENT — PAIN DESCRIPTION - ONSET: ONSET: GRADUAL

## 2021-12-17 ASSESSMENT — PAIN DESCRIPTION - LOCATION
LOCATION: HIP
LOCATION: HIP

## 2021-12-17 ASSESSMENT — PAIN - FUNCTIONAL ASSESSMENT: PAIN_FUNCTIONAL_ASSESSMENT: PREVENTS OR INTERFERES SOME ACTIVE ACTIVITIES AND ADLS

## 2021-12-17 ASSESSMENT — PAIN DESCRIPTION - FREQUENCY: FREQUENCY: INTERMITTENT

## 2021-12-17 NOTE — PLAN OF CARE
History  Chief Complaint   Patient presents with    Toe Laceration     Pt states that he slipped in the shower and cut his 1st and 5th toe on the right foot     Sandra Schwartz is a 37 y o  male w PMH none significant who presents for evaluation of toe laceration  Pt w a laceration to the R toe  He slipped in the shower today  He reports there were a bunch of small glass decorative vials and candles in the area and he ended up hitting one of these and breaking the glass  He has mild pain, no numbness  Tetanus is utd  He is here bc the wound is still oozing  None       History reviewed  No pertinent past medical history  Past Surgical History:   Procedure Laterality Date    VASECTOMY         History reviewed  No pertinent family history  I have reviewed and agree with the history as documented  Social History     Tobacco Use    Smoking status: Never Smoker    Smokeless tobacco: Never Used   Substance Use Topics    Alcohol use: No    Drug use: No        Review of Systems   Constitutional: Negative for activity change, chills, diaphoresis, fatigue and fever  HENT: Negative for congestion and rhinorrhea  Eyes: Negative for pain  Respiratory: Negative for cough, chest tightness, shortness of breath and wheezing  Cardiovascular: Negative for chest pain and palpitations  Gastrointestinal: Negative for abdominal distention, constipation, diarrhea, nausea and vomiting  Genitourinary: Negative for difficulty urinating and dysuria  Musculoskeletal: Negative for arthralgias and myalgias  Skin: Positive for wound  Neurological: Negative for dizziness, weakness, light-headedness and headaches  Psychiatric/Behavioral: The patient is not nervous/anxious  Physical Exam  Physical Exam   Constitutional: He is oriented to person, place, and time  He appears well-developed and well-nourished  No distress  HENT:   Head: Normocephalic and atraumatic     Eyes: Pupils are equal, round, Left hip fracture.   NPO, ortho consult and reactive to light  Neck: Neck supple  No tracheal deviation present  Cardiovascular: Normal rate, regular rhythm, normal heart sounds and intact distal pulses  Exam reveals no gallop and no friction rub  No murmur heard  Pulmonary/Chest: Effort normal and breath sounds normal  No respiratory distress  He has no wheezes  He has no rales  Abdominal: Soft  Bowel sounds are normal  He exhibits no distension and no mass  There is no tenderness  There is no guarding  Musculoskeletal: He exhibits no edema or deformity  Normal rom of R foot and toes, nv intact    Neurological: He is alert and oriented to person, place, and time  Skin: Skin is warm and dry  He is not diaphoretic  There was a laceration to the first toe and 5th toe on the R foot  There is a 1cm curvilnear lac to the 5th toe along medial aspect but there is only some mild oozing, no active bleeding  There is a 3mm lac to the lateral distal tip of the R great toe, minimal oozing  Psychiatric: He has a normal mood and affect  His behavior is normal    Nursing note and vitals reviewed        Vital Signs  ED Triage Vitals [02/17/19 1852]   Temperature Pulse Respirations Blood Pressure SpO2   (!) 97 3 °F (36 3 °C) 62 20 (!) 140/103 98 %      Temp Source Heart Rate Source Patient Position - Orthostatic VS BP Location FiO2 (%)   Oral Monitor Sitting Left arm --      Pain Score       6           Vitals:    02/17/19 1852   BP: (!) 140/103   Pulse: 62   Patient Position - Orthostatic VS: Sitting       Visual Acuity      ED Medications  Medications - No data to display    Diagnostic Studies  Results Reviewed     None                 XR foot 3+ views RIGHT   ED Interpretation by Jing Maza PA-C (02/17 1946)   No obvious retained FB or fracture                 Procedures  Procedures       Phone Contacts  ED Phone Contact    ED Course                               MDM  Number of Diagnoses or Management Options  Laceration of great toe of right foot: Laceration of lesser toe:   Diagnosis management comments: DDX includes but not ltd to: Toe lacerations to 5th and great toe of the R foot  There is possible FB although non palpable or visible on exam  Consider retained shards of glass  Doubt fracture  Plan is to obtain:  XR of affected joint(s) for acute osseous abnormality or FB    Based on results:  Used histacryl to repair toe lacerations  Cleansed wounds w hydrogen peroxide  No visible FB on xr  Return parameters discussed  Pt requires f/u as an outpt  Pt expresses understanding w above treatment plan  All questions answered prior to d/c  Portions of the record may have been created with voice recognition software   Occasional wrong word or "sound a like" substitutions may have occurred due to the inherent limitations of voice recognition software   Read the chart carefully and recognize, using context, where substitutions have occurred  Disposition  Final diagnoses:   Laceration of great toe of right foot   Laceration of lesser toe - R 5th toe     Time reflects when diagnosis was documented in both MDM as applicable and the Disposition within this note     Time User Action Codes Description Comment    2/17/2019  7:47 PM Ciaran Segura Add [G88 293Z] Laceration of great toe of right foot     2/17/2019  7:47 PM Ciaran Segura Add [B18 943I] Laceration of lesser toe     2/17/2019  7:48 PM Ciaran Segura Modify [T36 [id number]] Laceration of lesser toe R 5th toe      ED Disposition     ED Disposition Condition Date/Time Comment    Discharge Stable Sun Feb 17, 2019  7:47 PM Michi Martinez discharge to home/self care              Follow-up Information     Follow up With Specialties Details Why Contact Info Additional Information    [facility] Emergency Department Emergency Medicine  If symptoms worsen [address] [place], [place] [address] [id number]  [address] [place] [address] ED, [address] [place] [address], [id number] There are no discharge medications for this patient  No discharge procedures on file      ED Provider  Electronically Signed by           Ed Queen PA-C  02/17/19 8819

## 2021-12-17 NOTE — H&P
Hospital Medicine History & Physical      PCP: Micki Aguero, APRN - CNP    Date of Admission: 12/17/2021    Date of Service: Pt seen/examined on 12/17/2021      Chief Complaint:    Chief Complaint   Patient presents with    Hip Pain     Pt had mechanical fall about an hour ago. C/o left hip pain and mild left arm pain. EMS gavve 50 fentanyl IM         History Of Present Illness: The patient is a [de-identified] y.o. female with HTN, HLD, COPD, aortic stenosis, AAA, bilateral carotid stenosis, peripheral vascular disease s/p femoral to femoral and femoral to pop bypass grafts, history of stroke who presented to [facility] ED with complaint of mechanical fall. Patient states that early this morning she was packing and tripped falling to her left side. She hit her left hip, ankle and elbow. She denies lightheadedness, dizziness, heart palpitations, chest pain, shortness of breath. She did not lose consciousness, she did not hit her head. She does have significant vascular history as above. She endorses history of stroke. At present she endorses left hip pain and denies all other symptoms. Patient endorses previous surgeries requiring general anesthesia with no complications or sequela. Denies bleeding disorders or anticoagulation medications. She is not on oxygen at baseline and per RN O2 saturation was in the 80s and recovered on 2 L. She was 96% on room air during my interview. Imaging in ED displayed left femoral neck fracture. Ortho consult was obtained. Patient is stable at this time and medically cleared for surgery.     Past Medical History:        Diagnosis Date    CVA [facility]cerebral vascular accident) ([facility] [address]     Hyperlipidemia     Hypertension     Lyme disease        Past Surgical History:        Procedure Laterality Date    APPENDECTOMY      BACK SURGERY      SHOULDER ARTHROSCOPY Right 2/26/2020    RIGHT SHOULDER ARTHROSCOPY WITH DEBRIDEMENT, ROTATOR CUFF REPAIR WITH REGENETEN PATCH, SUBACROMIAL DECOMPRESSION, BICEPS TENODESIS, EXCISION OF MASS  -BLOCK performed by Augustus Jackson MD at [facility] Right 02/26/2020    debridement, rotator cuff repair, Regeneten patch        Medications Prior to Admission:    Prior to Admission medications    Medication Sig Start Date End Date Taking? Authorizing Provider   predniSONE (DELTASONE) 20 MG tablet Take 3 tabs orally daily for 3 days, then take 2 tabs orally for 3 days then take 1 tab orally for 3 days. 6/25/20   AGAPITO Carroll CNP   raNITIdine (ZANTAC) 150 MG tablet Take 1 tablet by mouth 2 times daily for 5 days 6/25/20 6/30/20  AGAPITO Carroll CNP   ondansetron (ZOFRAN) 4 MG tablet Take 1 tablet by mouth every 4 hours as needed for Nausea or Vomiting 2/26/20   Augustus Jackson MD   Probiotic Product (PROBIOTIC ADVANCED PO) Take by mouth    Historical Provider, MD   Coenzyme Q10 (COQ10 PO) Take by mouth    Historical Provider, MD   aspirin 81 MG tablet Take 81 mg by mouth daily    Historical Provider, MD   atorvastatin (LIPITOR) 40 MG tablet Take 40 mg by mouth daily    Historical Provider, MD   AMLODIPINE BESYLATE PO Take by mouth    Historical Provider, MD   VENLAFAXINE HCL PO Take by mouth    Historical Provider, MD   Multiple Vitamins-Minerals (THERAPEUTIC MULTIVITAMIN-MINERALS) tablet Take 1 tablet by mouth daily    Historical Provider, MD   METOPROLOL TARTRATE PO Take by mouth    Historical Provider, MD       Allergies:  Carvedilol, Penicillins, and Sulfa antibiotics    Social History:  The patient currently lives at home with daughter and son-in-law    TOBACCO:   reports that she has quit smoking. Her smoking use included cigarettes. She smoked 1.50 packs per day. She has never used smokeless tobacco.  ETOH:   reports previous alcohol use. Family History:   Positive as follows:    History reviewed. No pertinent family history.     REVIEW OF SYSTEMS:       Constitutional: Negative for fever   HENT: Negative for sore throat Eyes: Negative for redness   Respiratory: Negative  for dyspnea, cough   Cardiovascular: Negative for chest pain   Gastrointestinal: Negative for vomiting, diarrhea   Genitourinary: Negative for hematuria   Musculoskeletal: Negative for arthralgias, + left hip pain  Skin: Negative for rash   Neurological: Negative for syncope   Hematological: Negative for adenopathy   Psychiatric/Behavorial: Negative for anxiety    PHYSICAL EXAM:    BP (!) 143/72   Pulse 87   Temp 97.9 °F (36.6 °C) (Oral)   Resp 16   Ht 5' 1\" (1.549 m)   Wt 117 lb (53.1 kg)   SpO2 99%   BMI 22.11 kg/m²     Gen: No distress. Alert. Eyes: PERRL. No sclera icterus. No conjunctival injection. ENT: No discharge. Pharynx clear. Neck: Trachea midline. Resp: No accessory muscle use. Minimal crackles. No wheezes. No rhonchi. CV: Regular rate. Regular rhythm. Systolic murmur noted. No rub. No edema. Capillary Refill: Brisk,< 3 seconds   Peripheral Pulses: +2 palpable, equal bilaterally   GI: Non-tender. Non-distended. No masses. No organomegaly. Normal bowel sounds. No hernia. Skin: Warm and dry. No nodule on exposed extremities. No rash on exposed extremities. M/S: No cyanosis. No joint deformity. No clubbing. Left elbow and left ankle FROM, no deformity neurovascularly intact. Left hip no bruising noted, significantly TTP, no visible deformity. Distal left lower extremity neurovascularly intact. Neuro: Awake. Grossly nonfocal    Psych: Oriented x 3. No anxiety or agitation. CBC:   Recent Labs     12/17/21 0448   WBC 17.8*   HGB 13.2   HCT 40.8   MCV 89.8        BMP:   Recent Labs     12/17/21 0448      K 3.3*   CL 97*   CO2 34*   BUN 32*   CREATININE 1.2     LIVER PROFILE:   Recent Labs     12/17/21 0448   AST 30   ALT 20   BILITOT 0.3   ALKPHOS 136*     CULTURES  Results for Trav Burn (MRN [id number]) as of 12/17/2021 11:27   Ref.  Range 12/17/2021 08:10   SARS-CoV-2, NAAT Latest Ref Range: Not Detected Not Detected     EKG:    Normal sinus rhythm  Possible Left atrial enlargement  Left ventricular hypertrophy  Cannot rule out Septal infarct (cited on or before 23-JUN-2020)  Prolonged QT  Abnormal ECG  When compared with ECG of 23-JUN-2020 18:09,  Premature ventricular complexes are no longer Present  Confirmed by Kwesi Hankins MD, [address] ([id number]) on 12/17/2021 7:28:44 AM    RADIOLOGY  XR ELBOW LEFT (2 VIEWS)   Final Result   No acute osseous abnormality identified in the elbow. XR HIP 2-3 VW W PELVIS LEFT   Final Result   Left femoral neck fracture with proximal migration of the femoral shaft   relative to the hip joint. XR ANKLE LEFT (2 VIEWS)   Final Result   Osteopenia, with no acute osseous fracture. Pertinent results reviewed:  LE Duplex Bilateral 11/16/21    FINAL IMPRESSIONS   1. Elevated velocities are noted at the proximal anastomosis of the right    to left femoral bypass graft, consistent with a greater than 50% stenosis. 2. The ankle/brachial index in the right lower extremity suggests mild    arterial insufficiency at rest.   3. Abnormal monophasic doppler spectra at the right femoral level suggest    significant arterial occlusive disease involving the inflow vessels of    the right lower extremity. 4. There is significant velocity elevation noted in the right distal    common femoral artery, consistent with a greater than 50% stenosis. 5. There is significant velocity elevation noted in the right mid    superficial femoral artery, consistent with a greater than 50% stenosis. 6. Monophasic flow is noted in the right tibial vessels. 7. The ankle/brachial index in the left lower extremity is within normal    limits, suggesting no evidence of arterial insufficiency at rest.   8. Normal multiphasic doppler spectra at the left femoral level suggest no    evidence of significant inflow occlusive disease in the left lower    extremity.    9. The left femoral to popliteal artery bypass graft is patent. However,    Peak systolic velocities below 45 cm/sec in the bypass graft may suggest    impending graft failure. 10. Three vessel runoff is noted in the left lower extremity. 11. Exam suggests a progression of arterial occlusive disease in the right    lower extremity, relative to the previous exam of 5/20/2021. Full report view including tables and additional findings is available in    the link below. Carotid Duplex Bilateral 11/16/21    FINAL IMPRESSIONS   1. 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 2. Estimated diameter reduction of the left internal carotid artery is    50-69%. 3. The bilateral common and external carotid arteries reveal no    significant stenosis. 4. The bilateral vertebral arteries are patent with antegrade flow. 5. There is significant progression of occlusive disease in the right    internal carotid artery, relative to the previous ultrasound exam of    5/21/2020. CT abdomen angio w/ runoff 6/16/21  IMPRESSION:     RIGHT LOWER EXTREMITY:     1.  STABLE APPEARANCE OF MILD IRREGULARITY OF THE PROXIMAL RIGHT SFA AND MODERATE IRREGULARITY OF THE DISTAL SFA   2.  PATENT THREE-VESSEL RUNOFF     LEFT LOWER EXTREMITY:     1.  PATENT RIGHT TO LEFT FEMORAL-FEMORAL BYPASS GRAFT   2.  PATENT LEFT FEMORAL-POPLITEAL BYPASS GRAFT   3.  PATENT TWO-VESSEL RUNOFF     OTHER:     1.  STABLE SMALL SACCULAR ANEURYSM IN THE DISTAL ABDOMINAL AORTA AT THE BIFURCATION   2.  50% OSTIAL STENOSIS OF THE RIGHT RENAL ARTERY   3.  EXTENSIVE SIGMOID DIVERTICULOSIS   4.  1.5 CM GALLSTONE     ECHO 4/16/20 [facility]    Study Conclusions   - Left ventricle: The cavity size is normal. There is mild     concentric hypertrophy. Systolic function was mildly reduced. The     estimated ejection fraction was in the range of 47% to 51%.     Hypokinesis of the inferior myocardium. The stroke volume is     90ml. The stroke index is 58ml/m^2.    - Aortic valve: Cusp separation was reduced. Transvalvular velocity     is increased. There is mild stenosis. There was mild to moderate     regurgitation. The mean systolic gradient is 25IE Hg. The peak     systolic gradient is 15NC Hg. The valve area by the velocity-time     integral method is 1.7cm^2. The valve area index by the     velocity-time integral method is 1.07cm^2/m^2. - Mitral valve: The annulus is moderately calcified. - Left atrium: The atrium is markedly dilated. - Inferior vena cava: The vessel was normal in size; the     respirophasic diameter changes were in the normal range (&gt;= 50%);     findings are consistent with normal central venous pressure. Kenton Avila MD have reviewed the chart on Karl Rodriguez and personally interviewed and examined patient, reviewed the data (labs and imaging) and after discussion with my PA formulated the plan. Agree with note with the following edits. HPI:     [de-identified]year old white female who fell at home today. She fell on her left side. She was unable to stand up. Her daughter called 911. Work up in the ER showed a left hip fracture. She has pain in the left hip. No chest pain or dyspnea. She is a former smoker. No CAD    PMH of HTN/HLD/Mild AS/AAA/bilateral carotid artery stenosis and PAD. Prior history of CVA. I reviewed the patient's Past Medical History, Past Surgical History, Medications, and Allergies. Physical exam:    BP (!) 148/75   Pulse 87   Temp 97.9 °F (36.6 °C) (Oral)   Resp 16   Ht 5' 1\" (1.549 m)   Wt 117 lb (53.1 kg)   SpO2 99%   BMI 22.11 kg/m²     Gen: No distress. Alert. Eyes: PERRL. No sclera icterus. No conjunctival injection. ENT: No discharge. Pharynx clear. Neck: Trachea midline. Normal thyroid. Resp: No accessory muscle use. No crackles. No wheezes. No rhonchi. No dullness on percussion. CV: Regular rate. Regular rhythm. ESM aortic area no rub. No edema. GI: Non-tender. Non-distended. No masses. No organomegaly. Normal bowel sounds. No hernia. Skin: Warm and dry. No nodule on exposed extremities. No rash on exposed extremities. Lymph: No cervical LAD. No supraclavicular LAD. M/S: left leg shortened. Neuro: Awake. Reflexes 2+ symmetric bilaterally. Moves all 4 extremities, non focal  Psych: Oriented x 3. No anxiety or agitation. Principal Problem:    Closed fracture of neck of left femur with malunion  Active Problems:    COPD (chronic obstructive pulmonary disease) ([facility])    AAA (abdominal aortic aneurysm) ([facility])    Mild aortic stenosis    History of CVA (cerebrovascular accident)  Resolved Problems:    * No resolved hospital problems. *     ASSESSMENT/PLAN:  #Left femoral neck fracture  -Mechanical fall  -Ortho consult; plan for surgery  -N.p.o. for now, IVF  -Neurovascular checks  -Pain control  -Medically cleared for surgery. No history of CAD. #HTN  -BP stable  -Hold home meds  -Monitor BP    #COPD no AE  -No home O2 required  -96% on room air during my interview  -Home meds not reconciled at this time  -Nurse communication to reconcile home COPD medications    #AAA without rupture  -Seen on recent CT  -Stable    #Bilateral carotid stenosis  #Peripheral vascular disease s/p femoral to femoral and femoral to pop bypass grafts  -Imaging as above, stable    #HLD  -Continue statin    #History of CVA  -No residual deficits noted    Mild AS- stable.      DVT Prophylaxis: Lovenox  Diet: Diet NPO  Code Status: Full Code    Caitlin Loving PA-C  12/17/2021 12:05 PM       Venus Medrano MD 12/17/2021 1:01 PM

## 2021-12-17 NOTE — ED NOTES
[id number] - called [address] [facility] for consult      Juanjo Fontaine  12/17/21 8215

## 2021-12-17 NOTE — ANESTHESIA PRE PROCEDURE
Department of Anesthesiology  Preprocedure Note       Name:  Brandee Felix   Age:  [de-identified] y.o.  :  1940                                          MRN:  [id number]         Date:  2021      Surgeon: Constance King):  Olga Pascal MD    Procedure: Procedure(s):  LEFT HIP HEMIARTHROPLASTY    Medications prior to admission:   Prior to Admission medications    Medication Sig Start Date End Date Taking? Authorizing Provider   predniSONE (DELTASONE) 20 MG tablet Take 3 tabs orally daily for 3 days, then take 2 tabs orally for 3 days then take 1 tab orally for 3 days.  20   AGAPITO Chan CNP   raNITIdine (ZANTAC) 150 MG tablet Take 1 tablet by mouth 2 times daily for 5 days 20  AGAPITO Chan CNP   ondansetron (ZOFRAN) 4 MG tablet Take 1 tablet by mouth every 4 hours as needed for Nausea or Vomiting 20   [facility] Officer, MD   Probiotic Product (PROBIOTIC ADVANCED PO) Take by mouth    Historical Provider, MD   Coenzyme Q10 (COQ10 PO) Take by mouth    Historical Provider, MD   aspirin 81 MG tablet Take 81 mg by mouth daily    Historical Provider, MD   atorvastatin (LIPITOR) 40 MG tablet Take 40 mg by mouth daily    Historical Provider, MD   AMLODIPINE BESYLATE PO Take by mouth    Historical Provider, MD   VENLAFAXINE HCL PO Take by mouth    Historical Provider, MD   Multiple Vitamins-Minerals (THERAPEUTIC MULTIVITAMIN-MINERALS) tablet Take 1 tablet by mouth daily    Historical Provider, MD   METOPROLOL TARTRATE PO Take by mouth    Historical Provider, MD       Current medications:    Current Facility-Administered Medications   Medication Dose Route Frequency Provider Last Rate Last Admin    aspirin EC tablet 81 mg  81 mg Oral Daily Brooklyn Dennison MD   81 mg at 21 0829    atorvastatin (LIPITOR) tablet 40 mg  40 mg Oral Daily Brooklyn Dennison MD   40 mg at 21 0830    sodium chloride flush 0.9 % injection 5-40 mL  5-40 mL IntraVENous 2 times per day Obiora E MD James        sodium chloride flush 0.9 % injection 5-40 mL  5-40 mL IntraVENous PRN Lazaro Pablo MD        0.9 % sodium chloride infusion  25 mL IntraVENous PRN Lazaro Pablo MD        enoxaparin (LOVENOX) injection 30 mg  30 mg SubCUTAneous Daily Lazaro Pablo MD   30 mg at 12/17/21 0830    polyethylene glycol (GLYCOLAX) packet 17 g  17 g Oral Daily PRN Lazaro Pablo MD        acetaminophen (TYLENOL) tablet 650 mg  650 mg Oral Q6H PRN Lazaro Pablo MD        Or    acetaminophen (TYLENOL) suppository 650 mg  650 mg Rectal Q6H PRN Lazaro Pablo MD        0.9 % sodium chloride infusion   IntraVENous Continuous Lazaro Pablo MD 75 mL/hr at 12/17/21 0807 New Bag at 12/17/21 0807    HYDROmorphone (DILAUDID) injection 0.5 mg  0.5 mg IntraVENous Q4H PRN Lazaro Pablo MD        traMADol (ULTRAM) tablet 50 mg  50 mg Oral Q6H PRN Lazaro Pablo MD   50 mg at 12/17/21 0830    prochlorperazine (COMPAZINE) injection 10 mg  10 mg IntraVENous Q6H PRN Lazaro Pablo MD        ceFAZolin (ANCEF) 2000 mg in sterile water 20 mL IV syringe  2,000 mg IntraVENous Once Coco Patton MD         Current Outpatient Medications   Medication Sig Dispense Refill    predniSONE (DELTASONE) 20 MG tablet Take 3 tabs orally daily for 3 days, then take 2 tabs orally for 3 days then take 1 tab orally for 3 days.  18 tablet 0    raNITIdine (ZANTAC) 150 MG tablet Take 1 tablet by mouth 2 times daily for 5 days 10 tablet 0    ondansetron (ZOFRAN) 4 MG tablet Take 1 tablet by mouth every 4 hours as needed for Nausea or Vomiting 20 tablet 0    Probiotic Product (PROBIOTIC ADVANCED PO) Take by mouth      Coenzyme Q10 (COQ10 PO) Take by mouth      aspirin 81 MG tablet Take 81 mg by mouth daily      atorvastatin (LIPITOR) 40 MG tablet Take 40 mg by mouth daily      AMLODIPINE BESYLATE PO Take by mouth      VENLAFAXINE HCL PO Take by mouth      Multiple Vitamins-Minerals (THERAPEUTIC MULTIVITAMIN-MINERALS) tablet Take 1 tablet by mouth daily      METOPROLOL TARTRATE PO Take by mouth         Allergies: Allergies   Allergen Reactions    Carvedilol Other (See Comments)     \"messes with my head and stomach\"  Other reaction(s): Other (See Comments)  \"messes with my head and stomach\"    Penicillins Hives    Sulfa Antibiotics Hives       Problem List:    Patient Active Problem List   Diagnosis Code    Closed fracture of neck of left femur with malunion S72.002P    Hyperlipidemia E78.5    Hypertension I10    COPD (chronic obstructive pulmonary disease) ([facility] J44.9    AAA (abdominal aortic aneurysm) ([facility] I71.4    Mild aortic stenosis I35.0    History of CVA (cerebrovascular accident) Z80.78    Fall from slip, trip, or stumble, initial encounter W01. 0XXA    Elbow injury, left, initial encounter S59.902A       Past Medical History:        Diagnosis Date    CVA (cerebral vascular accident) ([facility]     Hyperlipidemia     Hypertension     Lyme disease        Past Surgical History:        Procedure Laterality Date    APPENDECTOMY      BACK SURGERY      SHOULDER ARTHROSCOPY Right 2/26/2020    RIGHT SHOULDER ARTHROSCOPY WITH DEBRIDEMENT, ROTATOR CUFF REPAIR WITH REGENETEN PATCH, SUBACROMIAL DECOMPRESSION, BICEPS TENODESIS, EXCISION OF MASS  -BLOCK performed by Cristiano Katz MD [facility] Right 02/26/2020    debridement, rotator cuff repair, Regeneten patch        Social History:    Social History     Tobacco Use    Smoking status: Former Smoker     Packs/day: 1.50     Types: Cigarettes    Smokeless tobacco: Never Used   Substance Use Topics    Alcohol use: Not Currently                                Counseling given: Not Answered      Vital Signs (Current):   Vitals:    12/17/21 1015 12/17/21 1032 12/17/21 1126 12/17/21 1132   BP:  (!) 143/72  (!) 148/75   Pulse:       Resp:       Temp:       TempSrc:       SpO2: 100% 99% (!) 87% 99%   Weight:       Height: BP Readings from Last 3 Encounters:   12/17/21 (!) 148/75   06/25/20 (!) 145/80   06/23/20 129/81       NPO Status: Time of last liquid consumption: 1159                        Time of last solid consumption: 1159                        Date of last liquid consumption: 12/16/21                        Date of last solid food consumption: 12/16/21    BMI:   Wt Readings from Last 3 Encounters:   12/17/21 117 lb (53.1 kg)   06/25/20 130 lb (59 kg)   06/23/20 128 lb (58.1 kg)     Body mass index is 22.11 kg/m². CBC:   Lab Results   Component Value Date    WBC 17.8 12/17/2021    RBC 4.54 12/17/2021    HGB 13.2 12/17/2021    HCT 40.8 12/17/2021    MCV 89.8 12/17/2021    RDW 13.7 12/17/2021     12/17/2021       CMP:   Lab Results   Component Value Date     12/17/2021    K 3.3 12/17/2021    CL 97 12/17/2021    CO2 34 12/17/2021    BUN 32 12/17/2021    CREATININE 1.2 12/17/2021    GFRAA 52 12/17/2021    AGRATIO 1.2 12/17/2021    LABGLOM 43 12/17/2021    GLUCOSE 102 12/17/2021    PROT 6.8 12/17/2021    CALCIUM 9.2 12/17/2021    BILITOT 0.3 12/17/2021    ALKPHOS 136 12/17/2021    AST 30 12/17/2021    ALT 20 12/17/2021       POC Tests: No results for input(s): POCGLU, POCNA, POCK, POCCL, POCBUN, POCHEMO, POCHCT in the last 72 hours.     Coags: No results found for: PROTIME, INR, APTT    HCG (If Applicable): No results found for: PREGTESTUR, PREGSERUM, HCG, HCGQUANT     ABGs: No results found for: PHART, PO2ART, AUL1ENE, OIR7MTY, BEART, I6DBOBQD     Type & Screen (If Applicable):  No results found for: LABABO, LABRH    Drug/Infectious Status (If Applicable):  No results found for: HIV, HEPCAB    COVID-19 Screening (If Applicable):   Lab Results   Component Value Date    COVID19 Not Detected 12/17/2021           Anesthesia Evaluation   no history of anesthetic complications:   Airway: Mallampati: II  TM distance: <3 FB   Neck ROM: limited  Mouth opening: > = 3 FB Dental:    (+) upper dentures and lower dentures      Pulmonary:   (+) COPD:                            ROS comment: H/o tob   Cardiovascular:    (+) hypertension:, valvular problems/murmurs (mild AS): AS, hyperlipidemia                  Neuro/Psych:   (+) CVA:,             GI/Hepatic/Renal: Neg GI/Hepatic/Renal ROS            Endo/Other: Negative Endo/Other ROS                    Abdominal:             Vascular:           ROS comment: S/p AAA repair. Other Findings:             Anesthesia Plan      general     ASA 3     (Risks, benefits and alternatives of GETA for operative care and ultrasound guided fascia iliaca compartment nerve block for post operative analgesia discussed with pt. All questions answered. Willing to proceed.)  Induction: intravenous. MIPS: Postoperative opioids intended. Anesthetic plan and risks discussed with patient.                       Deandre Guillaume MD   12/17/2021

## 2021-12-17 NOTE — ED NOTES
Lisa SHEPPARD trialed pt off O2 to RA. Pt O2 dropped to 85-87% RA. Writer returned pt to 2L NC. Pt O2 now 99%.         Cara Aguirre RN  12/17/21 9463

## 2021-12-17 NOTE — ED NOTES
Bed: 09  Expected date: 12/17/21  Expected time:   Means of arrival:   Comments:  EMS     Anel Kwon  12/17/21 0323

## 2021-12-17 NOTE — ACP (ADVANCE CARE PLANNING)
Patient states that she is comfortable having her daughter as her decision maker. Pt also states that she already has ACP documents and does not wish to complete new documents at this time.

## 2021-12-17 NOTE — ACP (ADVANCE CARE PLANNING)
Advance Care Planning     General Advance Care Planning (ACP) Conversation    Date of Conversation: 12/17/2021  Conducted with:  Healthcare Decision Maker: Next of Kin by law (only applies in absence of above) (name) [address] Decision Maker:    Primary Decision Maker: Eloina Ni - Child - [phone]  Click here to complete Healthcare Decision Makers including selection of the Healthcare Decision Maker Relationship (ie \"Primary\"). Today we documented Decision Maker(s) consistent with Legal Next of Kin hierarchy.   Has [place] LW documents  Content/Action Overview:  Has NO ACP documents/care preferences - requested patient complete ACP documents  Reviewed DNR/DNI and patient elects Full Code (Attempt Resuscitation)  Referral to Pastoral Care for DPOA/LW information    Length of Voluntary ACP Conversation in minutes:  <16 minutes (Non-Billable)    Hannah Thurman RN

## 2021-12-17 NOTE — ED PROVIDER NOTES
Of injury to her lowEmergency Physician Note  [facility] ED  [address] [facility] [place] [address]  Dept: [phone]  Loc: [phone]  Open Note Time:  4:52 AM EST    Chief Complaint  Hip Pain (Pt had mechanical fall about an hour ago. C/o left hip pain and mild left arm pain. EMS gavve 50 fentanyl IM)     History of Present Illness  Wolf Cotto is a [de-identified] y.o. female  has a past medical history of CVA (cerebral vascular accident) ([address] Hyperlipidemia, Hypertension, and Lyme disease. who presents to the ED for fall. Patient states she was packing and she attempted to slap step around a car table and she tripped over the leg of the table. Landed on the left side. Elbow and her left ankle and immediate severe pain to her left hip and difficulty with extending her left leg. She did not hit her head, she is not anticoagulated, did not lose consciousness. Denies fever, neck pain, chest pain, shortness of breath, cough, abdominal pain, nausea, vomiting, diarrhea, headache,  back pain, rash. No palliative/provocative factors. Unless otherwise stated in this report or unable to obtain because of the patient's clinical or mental status as evidenced by the medical record, this patient's positive and negative responses for review of systems, constitutional, psych, eyes, ENT, cardiovascular, respiratory, gastrointestinal, neurological, genitourinary, musculoskeletal, integument systems and systems related to the presenting problem are either stated in the preceding paragraph or were not pertinent or were negative for the symptoms and/or complaints related to the medical problem. I have reviewed the following from the nursing documentation:      Prior to Admission medications    Medication Sig Start Date End Date Taking?  Authorizing Provider   predniSONE (DELTASONE) 20 MG tablet Take 3 tabs orally daily for 3 days, then take 2 tabs orally for 3 days then take 1 tab orally for 3 days. 6/25/20   AGAPITO Grayson CNP   raNITIdine (ZANTAC) 150 MG tablet Take 1 tablet by mouth 2 times daily for 5 days 6/25/20 6/30/20  AGAPITO Grayson CNP   ondansetron (ZOFRAN) 4 MG tablet Take 1 tablet by mouth every 4 hours as needed for Nausea or Vomiting 2/26/20   Ileana Alcazar MD   Probiotic Product (PROBIOTIC ADVANCED PO) Take by mouth    Historical Provider, MD   Coenzyme Q10 (COQ10 PO) Take by mouth    Historical Provider, MD   aspirin 81 MG tablet Take 81 mg by mouth daily    Historical Provider, MD   atorvastatin (LIPITOR) 40 MG tablet Take 40 mg by mouth daily    Historical Provider, MD   AMLODIPINE BESYLATE PO Take by mouth    Historical Provider, MD   VENLAFAXINE HCL PO Take by mouth    Historical Provider, MD   Multiple Vitamins-Minerals (THERAPEUTIC MULTIVITAMIN-MINERALS) tablet Take 1 tablet by mouth daily    Historical Provider, MD   METOPROLOL TARTRATE PO Take by mouth    Historical Provider, MD       Allergies as of 12/17/2021 - Fully Reviewed 12/17/2021   Allergen Reaction Noted    Carvedilol Other (See Comments) 08/10/2017    Penicillins Hives 08/10/2017    Sulfa antibiotics Hives 08/10/2017       Past Medical History:   Diagnosis Date    [facility]ce[facility]) [facility]     Hyperlipidemia     Hypertension     Lyme disease         Surgical History:   Past Surgical History:   Procedure Laterality Date    APPENDECTOMY      BACK SURGERY      SHOULDER ARTHROSCOPY Right 2/26/2020    RIGHT SHOULDER ARTHROSCOPY WITH DEBRIDEMENT, ROTATOR CUFF REPAIR WITH REGENETEN PATCH, SUBACROMIAL DECOMPRESSION, BICEPS TENODESIS, EXCISION OF MASS  -BLOCK performed by Ileana Alcazar MD at [address] [place] [address] Right 02/26/2020    debridement, rotator cuff repair, Regeneten patch         Family History:  History reviewed. No pertinent family history.     Social History     Socioeconomic History    Marital status:      Spouse name: Not on file    Number of children: Not on file    Years of education: Not on file    Highest education level: Not on file   Occupational History    Not on file   Tobacco Use    Smoking status: Former Smoker     Packs/day: 1.50     Types: Cigarettes    Smokeless tobacco: Never Used   Substance and Sexual Activity    Alcohol use: Not Currently    Drug use: Not Currently     Types: Marijuana Minnie Celaya)    Sexual activity: Not on file   Other Topics Concern    Not on file   Social History Narrative    Not on file     Social Determinants of Health     Financial Resource Strain:     Difficulty of Paying Living Expenses: Not on file   Food Insecurity:     Worried About [address] in the Last Year: Not on file    Yandel of Food in the Last Year: Not on file   Transportation Needs:     Lack of Transportation (Medical): Not on file    Lack of Transportation (Non-Medical): Not on file   Physical Activity:     Days of Exercise per Week: Not on file    Minutes of Exercise per Session: Not on file   Stress:     Feeling of Stress : Not on file   Social Connections:     Frequency of Communication with Friends and Family: Not on file    Frequency of Social Gatherings with Friends and Family: Not on file    Attends Ten[employer] Services: Not on file    Active Member of [address] [place] [address] or Organizations: Not on file    Attends Club or Organization Meetings: Not on file    Marital Status: Not on file   Intimate Partner Violence:     Fear of Current or Ex-Partner: Not on file    Emotionally Abused: Not on file    Physically Abused: Not on file    Sexually Abused: Not on file   Housing Stability:     Unable to Pay for Housing in the Last Year: Not on file    Number of [place] in the Last Year: Not on file    Unstable Housing in the Last Year: Not on file       Nursing notes reviewed.     ED Triage Vitals [12/17/21 9909]   Enc Vitals Group      BP (!) 171/72      Pulse 71      Resp 16      Temp 97.9 °F (36.6 °C) Temp Source Oral      SpO2 94 %      Weight 117 lb (53.1 kg)      Height 5' 1\" (1.549 m)      Head Circumference       Peak Flow       Pain Score       Pain Loc       Pain Edu? Excl. in [address]? GENERAL:   Body mass index is 22.11 kg/m². Awake, alert. Well developed, well nourished with apparent distress. Nontoxic-appearing, non-ill-appearing. HENT:   Normocephalic, Atraumatic, no lacerations. No ENT exam due to PPE. EYES:   Conjunctiva normal,   Pupils equal round and reactive to light,   Extraocular movements normal.  NECK:  Trachea is midline. No stridor. CHEST:  Regular rate and regular rhythm, no murmurs/rubs/gallops,  normal S1/S2, chest wall non-tender. LUNGS:  No respiratory distress. No abdominal retractions, no sternal retractions  Clear to auscultation bilaterally, no wheezing, no rhochi, no rales  Speaking comfortably in full sentences  ABDOMEN:  Soft, non-tender, non-distended. No guarding. No rebound. No costovertebral angle tenderness to palpation. Normal BS, no organomegaly, no abdominal masses  EXTREMITIES:  Moves extremities x3 with purpose. Unable to move left lower extremity secondary to pain  Normal range of motion except for limited internal and external rotation of the left hip, no edema,  Moderate tenderness to palpation over the left hip, left leg is internally rotated and foreshortened. ,  distal pulses present and equal bilaterally. She has a developing ecchymosis on the left elbow. She still has full flexion extension of the left elbow without limited secondary to pain. Mild lateral tenderness to palpation of the left ankle, no swelling or deformity of the left ankle, no ecchymosis of the left ankle. BACK:  No midline tenderness in the cervical, thoracic, and lumbar spine. No deformities, no step-off. SKIN:  Warm, dry and intact. NEUROLOGIC:  Normal mental status. Moving all extremities to command.   Alert and oriented x4  without focal motor deficit or Basophils % 0.3 %    Neutrophils Absolute 14.4 (H) 1.7 - 7.7 K/uL    Lymphocytes Absolute 2.1 1.0 - 5.1 K/uL    Monocytes Absolute 1.0 0.0 - 1.3 K/uL    Eosinophils Absolute 0.3 0.0 - 0.6 K/uL    Basophils Absolute 0.1 0.0 - 0.2 K/uL   Comprehensive Metabolic Panel w/ Reflex to MG   Result Value Ref Range    Sodium 138 136 - 145 mmol/L    Potassium reflex Magnesium 3.3 (L) 3.5 - 5.1 mmol/L    Chloride 97 (L) 99 - 110 mmol/L    CO2 34 (H) 21 - 32 mmol/L    Anion Gap 7 3 - 16    Glucose 102 (H) 70 - 99 mg/dL    BUN 32 (H) 7 - 20 mg/dL    CREATININE 1.2 0.6 - 1.2 mg/dL    GFR Non- 43 (A) >60    GFR  52 (A) >60    Calcium 9.2 8.3 - 10.6 mg/dL    Total Protein 6.8 6.4 - 8.2 g/dL    Albumin 3.7 3.4 - 5.0 g/dL    Albumin/Globulin Ratio 1.2 1.1 - 2.2    Total Bilirubin 0.3 0.0 - 1.0 mg/dL    Alkaline Phosphatase 136 (H) 40 - 129 U/L    ALT 20 10 - 40 U/L    AST 30 15 - 37 U/L   Magnesium   Result Value Ref Range    Magnesium 2.10 1.80 - 2.40 mg/dL   EKG 12 Lead   Result Value Ref Range    Ventricular Rate 72 BPM    Atrial Rate 72 BPM    P-R Interval 198 ms    QRS Duration 106 ms    Q-T Interval 472 ms    QTc Calculation (Bazett) 516 ms    P Axis 62 degrees    R Axis -16 degrees    T Axis 56 degrees    Diagnosis       Normal sinus rhythmPossible Left atrial enlargementLeft ventricular hypertrophyCannot rule out Septal infarct , age undeterminedProlonged QTAbnormal ECGNo previous ECGs available       SCREENINGS  NIH Score     [place]  Virgilio Coma Scale  Eye Opening: Spontaneous  Best Verbal Response: Oriented  Best Motor Response: Obeys commands  [place] Coma Scale Score: 15  [facility]    Heart Score       PROCEDURES    MEDICAL DECISION MAKING    Procedures/interventions/images ordered for this visit  Orders Placed This Encounter   Procedures    XR ANKLE LEFT (2 VIEWS)    XR HIP 2-3 VW W PELVIS LEFT    XR ELBOW LEFT (2 VIEWS)    CBC Auto Differential    Comprehensive Metabolic Panel w/ Reflex to MG    EKG 12 Lead       Medications ordered for this visit  Orders Placed This Encounter   Medications    0.9 % sodium chloride bolus    morphine sulfate (PF) injection 4 mg       ED course notes for this visit       I evaluated the patient in room 09/09    - Patient is aware of the left hip fracture, she is aware that she cannot be admitted        I spoke with Dr. Brittanie Ovalle. We thoroughly discussed the history, physical exam, laboratory and imaging studies, as well as, emergency department course. Based upon that discussion, we've decided to admit Karl Rodriguez for further observation and evaluation of Babs Emanuel left hip fracture. As I have deemed necessary from their history, physical, and studies, I have considered and evaluated Karl Rodriguez for the following diagnoses:  Differential diagnosis: fracture, dislocation,  sprain, vascular injury, neurologic injury, tendon injury, other       FINAL IMPRESSION  1. Left displaced femoral neck fracture ([address]    2. Fall from slip, trip, or stumble, initial encounter    3. Left hip pain    4. Elbow injury, left, initial encounter    5. Injury of left ankle, initial encounter        Vitals:  Blood pressure (!) 140/71, pulse 86, temperature 97.9 °F (36.6 °C), temperature source Oral, resp. rate 18, height 5' 1\" (1.549 m), weight 117 lb (53.1 kg), SpO2 94 %. Disposition  Patient understands that they are going to be admitted to the hospital.  There was shared decision making between myself as well as the patient and/or their surrogate and we are in agreement with admission. There is an opportunity for questions and all questions answered to the best of my ability and to the satisfaction of the patient and/or patient's family. Pt is in stable condition upon Admit to med/surg floor. Pt was seen during the [place] 19 pandemic. Appropriate PPE worn by this writer during patient encounters.  Pt seen during a time with constrained hospital bed capacity and other potential inpatient and outpatient resources were constrained due to the viral pandemic. The note was completed using Dragon voice recognition transcription. Every effort was made to ensure accuracy; however, inadvertent transcription errors may be present despite my best efforts to edit errors.     Sarah Stephens MD  [address] [place] [address] MD Dalila  12/17/21 3164

## 2021-12-17 NOTE — ANESTHESIA PROCEDURE NOTES
Peripheral Block    Patient location during procedure: pre-op  Staffing  Performed: anesthesiologist   Anesthesiologist: Meena Mcclellan MD  Preanesthetic Checklist  Completed: patient identified, IV checked, site marked, risks and benefits discussed, surgical consent, monitors and equipment checked, pre-op evaluation, timeout performed, anesthesia consent given, oxygen available and patient being monitored  Peripheral Block  Patient position: supine  Prep: ChloraPrep  Patient monitoring: continuous pulse ox and IV access  Block type: Fascia iliaca  Laterality: left  Injection technique: single-shot  Guidance: ultrasound guided  Local infiltration: lidocaine  Infiltration strength: 1 %  Dose: 3 mL  Provider prep: mask and sterile gloves  Local infiltration: lidocaine  Needle  Needle type: combined needle/nerve stimulator   Needle gauge: 21 G  Needle length: 10 cm  Needle localization: ultrasound guidance  Assessment  Injection assessment: negative aspiration for heme, no paresthesia on injection and local visualized surrounding nerve on ultrasound  Paresthesia pain: none  Slow fractionated injection: yes  Hemodynamics: stable  Additional Notes  Immediately prior to procedure a \"time out\" was called to verify the correct patient, allergies, laterality, procedure and equipment. Time out performed with Sean Chacon RN    Local Anesthetic: 0.25 %  Bupivacaine  Plus epi 1 to 200K Amount: 30 ml  in 5 ml increments after negative aspiration each time. No immediate complications.       Reason for block: post-op pain management and at surgeon's request

## 2021-12-17 NOTE — CONSULTS
[address] [facility] Orthopedic Consult Note        Consult reason/question:  L hip fracture    Reason for admission:  Left hip fracture    HPI:  The patient is a [de-identified] y.o. female, retired, who presents with a left femoral neck fracture. She fell from standing early this morning. No LOC.     Baseline Functional Status:  Community ambulator without mechanical devices    Past Medical History:        Diagnosis Date    CVA (cerebral vascular accident) ([address]     Hyperlipidemia     Hypertension     Lyme disease        Past Surgical History:        Procedure Laterality Date    APPENDECTOMY      BACK SURGERY      SHOULDER ARTHROSCOPY Right 2/26/2020    RIGHT SHOULDER ARTHROSCOPY WITH DEBRIDEMENT, ROTATOR CUFF REPAIR WITH REGENETEN PATCH, SUBACROMIAL DECOMPRESSION, BICEPS TENODESIS, EXCISION OF MASS  -BLOCK performed by Lyric Gomez MD at [facility] Right 02/26/2020    debridement, rotator cuff repair, Regeneten patch        Allergies:  Carvedilol, Penicillins, and Sulfa antibiotics    Current Medications:   Current Facility-Administered Medications: aspirin EC tablet 81 mg, 81 mg, Oral, Daily  atorvastatin (LIPITOR) tablet 40 mg, 40 mg, Oral, Daily  sodium chloride flush 0.9 % injection 5-40 mL, 5-40 mL, IntraVENous, 2 times per day  sodium chloride flush 0.9 % injection 5-40 mL, 5-40 mL, IntraVENous, PRN  0.9 % sodium chloride infusion, 25 mL, IntraVENous, PRN  enoxaparin (LOVENOX) injection 30 mg, 30 mg, SubCUTAneous, Daily  polyethylene glycol (GLYCOLAX) packet 17 g, 17 g, Oral, Daily PRN  acetaminophen (TYLENOL) tablet 650 mg, 650 mg, Oral, Q6H PRN **OR** acetaminophen (TYLENOL) suppository 650 mg, 650 mg, Rectal, Q6H PRN  0.9 % sodium chloride infusion, , IntraVENous, Continuous  HYDROmorphone (DILAUDID) injection 0.5 mg, 0.5 mg, IntraVENous, Q4H PRN  traMADol (ULTRAM) tablet 50 mg, 50 mg, Oral, Q6H PRN  prochlorperazine (COMPAZINE) injection 10 mg, 10 mg, IntraVENous, Q6H PRN    Social History: TOBACCO:   reports that she has quit smoking. Her smoking use included cigarettes. She smoked 1.50 packs per day. She has never used smokeless tobacco.  ETOH:   reports previous alcohol use. DRUGS:   reports previous drug use. Drug: Marijuana Hailey Poole). Review of Systems:  A 14 point review of systems was conducted and was negative other than that discussed in the history of present illness. Physical Examination:  The patient is resting comfortably in the hospital room, in no acute distress. The patient demonstrates intact coordination with sitting, standing, ambulating, and hand shaking. The patient is alert and oriented to person, place, and time. Mood and affect are normal.  She is alone  Normal respiratory effort. Pulse is regular in rate and rhythm. LLE:  Shortened, externally rotated  DP pulse intact at   SILT distally    Labs/Data Review:  CBC:   Lab Results   Component Value Date    WBC 17.8 12/17/2021    RBC 4.54 12/17/2021    HGB 13.2 12/17/2021    HCT 40.8 12/17/2021    MCV 89.8 12/17/2021    MCH 29.2 12/17/2021    MCHC 32.5 12/17/2021    RDW 13.7 12/17/2021     12/17/2021    MPV 8.5 12/17/2021         Radiology Review:    XR ELBOW LEFT (2 VIEWS)    Result Date: 12/17/2021  EXAMINATION: 2 XRAY VIEWS OF THE LEFT ELBOW 12/17/2021 5:23 am COMPARISON: None. HISTORY: ORDERING SYSTEM PROVIDED HISTORY: s/p fall, left hip, left ankle, left elbow pain - left leg is rotated and foreshortened TECHNOLOGIST PROVIDED HISTORY: Reason for exam:->s/p fall, left hip, left ankle, left elbow pain - left leg is rotated and foreshortened Reason for Exam: fall FINDINGS: No fracture is identified. No dislocation is identified. An intravenous catheter is seen in the antecubital fossa. No personal joint effusion is identified though limited true lateral view. No acute osseous abnormality identified in the elbow.      XR ANKLE LEFT (2 VIEWS)    Result Date: 12/17/2021  EXAMINATION: 3 XRAY VIEWS OF THE LEFT elected to proceed with surgery.     Plan:   Will d/w OR to find time for surgery   Appreciate IM recs/clearance   Continue NPO    Mobile #:  Jasvir Young MD   12/17/2021  12:12 PM

## 2021-12-17 NOTE — PROGRESS NOTES
Report and transfer of care given to Mar floor RN. Dressing is dry and intact. Patient denies any needs at this time. Ivan Egan RN

## 2021-12-17 NOTE — ED NOTES
Writer spoke with pt's daughter to provide pt update. Writer provided update. Daughter will call pt on pt's cell phone in room, and writer encouraged daughter to call again later for further updates on pt status.       Richard Goetz RN  12/17/21 1233

## 2021-12-17 NOTE — CARE COORDINATION
Case Management Assessment  Initial Evaluation      Patient Name: Karl Rodriguez  YOB: 1940  Diagnosis: Closed fracture of neck of left femur with malunion [S72.002P]  Date / Time: 12/17/2021  3:22 AM    Admission status/Date:  12/17/2021  Chart Reviewed: Yes      Patient Shayna Ferguson: Yes - phone  Family Interviewed:  No      Hospitalization in the last 30 days:  No    Health Care Decision Maker :   Primary Decision Maker: Fabienne Zavala - Child - [phone]    (CM - must 1st enter selection under Navigator - emergency contact- [place] Relationship and pick relationship)   Who do you trust or have selected to make healthcare decisions for you    Current PCP: [address] required for SNF : WAIVED         3 night stay required - WAIVED    ADLS  Support Systems/Care Needs:    Transportation: family    Meal Preparation: self    Housing  Living Arrangements: lives with family in single story house  Steps: 5 steps w/HR to enter. Intent for return to present living arrangements: Yes  Identified Issues: may need rehab    [address] [place] [address] with [address] [place] [address] Way : No Agency:(Services)     Passport/Waiver : No  :                      Phone Number:    Passport/Waiver Services: NA          Durable Medical Equiptment   DME Provider: [facility]  Equipment:   Walker_X__Cane___RTS___ BSC___Shower Chair___Hospital Bed___W/C____Other________  02 at ____Liter(s)---wears(frequency)_______ HHN ___ CPAP___ BiPap___   N/A____      Home O2 Use :  No        Community Service Affiliation  Dialysis:  No    · Agency:  · Location:  · Dialysis Schedule:  · Phone:   · Fax: Other Community Services: (ex:PT/OT,Mental Health,Wound Clinic, Cardio/Pul [address])    DISCHARGE PLAN: Explained Case Management role/services. Chart reviewed. Met with pt and explained the role of the CM. IPTA.  Plan: TBD PENDING Ortho  Sx, OR and PT/OT post op eval and recs. +CM will follow and reassess.

## 2021-12-17 NOTE — ED NOTES
With rest after being medicated for pain, pt oxygen saturation dropped to 85% RA. Pt A&O x3. Oxygen at 2L NC initiated and oxygen saturation increased to 100%. Dr. Rebecca Henriquez made aware.      Carolina Santos, MARTIN  12/17/21 8220

## 2021-12-17 NOTE — OP NOTE
Operative Note      Patient: Willie Oliva  YOB: 1940  MRN: [id number]    Date of Procedure: 12/17/2021    Pre-Op Diagnosis: LEFT HIP femoral neck FRACTURE    Post-Op Diagnosis: Same       Procedure(s):  LEFT HIP HEMIARTHROPLASTY    Surgeon(s):  Valentina Awad MD    Assistant:   Surgical Assistant: Sofía Azar    Anesthesia: General    Estimated Blood Loss (mL): less than 101     Complications: None    Specimens:   * No specimens in log *    Implants:  Implant Name Type Inv. Item Serial No.  Lot No. LRB No. Used Action   CEMENT BNE 20ML 41GM FULL DOSE PMMA W/ TOBRA M VISC RADPQ  CEMENT BNE 20ML 41GM FULL DOSE PMMA W/ TOBRA M VISC RADPQ  [employer] [facility] [id number] Left 1 Implanted   CEMENT BNE 20ML 41GM FULL DOSE PMMA W/ TOBRA M VISC RADPQ  CEMENT BNE 20ML 41GM FULL DOSE PMMA W/ TOBRA M VISC RADPQ  [employer] [facility] [id number] Left 1 Implanted   CEMENT BIOPREP ST Cement CEMENT BIOPREP ST  [employer] [id number] Left 1 Implanted   VERSYS RICK LD/FX SZ [id number]  VERSYS RICK LD/FX SZ [id number]  [facility] [id number] Left 1 Implanted   VERSYS DISTAL CENTRALIZER 9MM  VERSYS DISTAL CENTRALIZER 9MM  [employer]- [id number] Left 1 Implanted   BIPOLAR SHELL 44MM OD  BIPOLAR SHELL 44MM OD  [employer]- [id number] Left 1 Implanted   BIPOLAR LINER 44/45/46MM OD X 28MM ID  BIPOLAR LINER 44/45/46MM OD X 28MM ID  [employer]- [id number] Left 1 Implanted   [employer] COCR HEAD FEMORAL HEAD 12/14 TAPER 28 MM DIAMETER -3.5 MM NECK LENGTH    [employer]- [id number] Left 1 Implanted         Drains:   [REMOVED] Urethral Catheter Non-latex 16 fr (Removed)   $ Urethral catheter insertion Inserted for procedure 12/17/21 1529   Urine Color Yellow 12/17/21 1529   Urine Appearance Cloudy 12/17/21 1529       Findings: stable hip after bessy arthroplasty    Detailed Description of Procedure:    The patient was brought to the OR and general anesthesia was administered. She was placed in the lateral position with the injured left hip facing up. An axillary roll was placed. A lateral positioner was secured and the down leg was well-padded. The LLE was prepped and draped in the standard orthopaedic sterile fashion. A final time out was called with all parties being in agreement. I made a 10 cm longitudinal incision centered over the greater trochanter. After going through skin with a 10-blade, I proceeded with deeper dissection using electrocautery. The fascia was identified and incised in line with the skin incision. A Charnley retractor was placed. The femoral neck fracture was identified and the leg externally rotated. The anterior third of the abductors were released from the greater trochanter with Bovie. The capsul was opened and the femoral head was removed and sized. The hip was flexed and externally rotated and the neck cut was made 1 finger breadth above the lesser trochanter. A box osteotome was used followed by an opening reamer. I broached up to a size 12. A trial was reduced and I found a -3.5 head to be stable. The trial was removed and the canal was washed and dried. A cement restrictor was placed 2 cm beyond the length of the stem. I then pressurized the canal and placed the stem with lateral pressure and appropriate anteversion. Once the cement was dried, the bipolar head was impacted onto the stem and the joint was reduced. It was stable and leg length was appropriate. Tension on soft tissue was appropriate. The wound irrigated with betadine, and then rinsed with saline. The capsul was repaired with 0 Vicryl. The abductors were repaired to the greater trochanter using drill holes and #2 Ethibond. The fascia was closed with 0 Vicryl and the skin was closed in layers, ending in staples. A mepelex dressing was placed. She was awakened from anesthesia and transferred to the PACU in stable condition.  There were no immediate complications.      Electronically signed by Sagrario Mercado MD on 12/17/2021 at 4:55 PM

## 2021-12-17 NOTE — PROGRESS NOTES
Patient admitted from OR to PACU. Bedside report received. Patient immediately hooked up to heart monitor. Andi River RN

## 2021-12-17 NOTE — ANESTHESIA POSTPROCEDURE EVALUATION
Department of Anesthesiology  Postprocedure Note    Patient: Trinity Bateman  MRN: [id number]  YOB: 1940  Date of evaluation: 12/17/2021  Time:  6:18 PM     Procedure Summary     Date: 12/17/21 Room / Location: [address] [place] [address] / [facility]    Anesthesia Start: 1449 Anesthesia Stop: 1648    Procedure: LEFT HIP HEMIARTHROPLASTY (Left Hip) Diagnosis: (LEFT HIP FRACTURE)    Surgeons: Kori Mujica MD Responsible Provider: Susan Mahoney MD    Anesthesia Type: general ASA Status: 3          Anesthesia Type: general    Nancy Phase I: Nancy Score: 8    Nancy Phase II:      Last vitals: Reviewed and per EMR flowsheets. Anesthesia Post Evaluation    Patient location during evaluation: PACU  Patient participation: complete - patient participated  Level of consciousness: awake and alert  Airway patency: patent  Nausea & Vomiting: no nausea and no vomiting  Complications: no  Cardiovascular status: blood pressure returned to baseline  Respiratory status: acceptable  Hydration status: euvolemic  Comments: VSS on transfer to phase 2 recovery. No anesthetic complications.

## 2021-12-18 ENCOUNTER — APPOINTMENT (OUTPATIENT)
Dept: GENERAL RADIOLOGY | Age: 81
DRG: 522 | End: 2021-12-18
Payer: MEDICARE

## 2021-12-18 PROBLEM — S72.002A LEFT DISPLACED FEMORAL NECK FRACTURE (HCC): Status: ACTIVE | Noted: 2021-12-17

## 2021-12-18 LAB
ANION GAP SERPL CALCULATED.3IONS-SCNC: 8 MMOL/L (ref 3–16)
BASOPHILS ABSOLUTE: 0 K/UL (ref 0–0.2)
BASOPHILS RELATIVE PERCENT: 0.2 %
BUN BLDV-MCNC: 19 MG/DL (ref 7–20)
CALCIUM SERPL-MCNC: 8.5 MG/DL (ref 8.3–10.6)
CHLORIDE BLD-SCNC: 100 MMOL/L (ref 99–110)
CO2: 30 MMOL/L (ref 21–32)
CREAT SERPL-MCNC: 0.8 MG/DL (ref 0.6–1.2)
EOSINOPHILS ABSOLUTE: 0 K/UL (ref 0–0.6)
EOSINOPHILS RELATIVE PERCENT: 0.1 %
GFR AFRICAN AMERICAN: >60
GFR NON-AFRICAN AMERICAN: >60
GLUCOSE BLD-MCNC: 129 MG/DL (ref 70–99)
GLUCOSE BLD-MCNC: 140 MG/DL (ref 70–99)
HCT VFR BLD CALC: 33 % (ref 36–48)
HEMOGLOBIN: 11.1 G/DL (ref 12–16)
LYMPHOCYTES ABSOLUTE: 0.6 K/UL (ref 1–5.1)
LYMPHOCYTES RELATIVE PERCENT: 6.3 %
MAGNESIUM: 1.7 MG/DL (ref 1.8–2.4)
MCH RBC QN AUTO: 30.2 PG (ref 26–34)
MCHC RBC AUTO-ENTMCNC: 33.8 G/DL (ref 31–36)
MCV RBC AUTO: 89.4 FL (ref 80–100)
MONOCYTES ABSOLUTE: 0.7 K/UL (ref 0–1.3)
MONOCYTES RELATIVE PERCENT: 6.8 %
NEUTROPHILS ABSOLUTE: 8.8 K/UL (ref 1.7–7.7)
NEUTROPHILS RELATIVE PERCENT: 86.6 %
PDW BLD-RTO: 13.3 % (ref 12.4–15.4)
PERFORMED ON: ABNORMAL
PLATELET # BLD: 176 K/UL (ref 135–450)
PMV BLD AUTO: 8.3 FL (ref 5–10.5)
POTASSIUM REFLEX MAGNESIUM: 3.4 MMOL/L (ref 3.5–5.1)
RBC # BLD: 3.69 M/UL (ref 4–5.2)
SODIUM BLD-SCNC: 138 MMOL/L (ref 136–145)
WBC # BLD: 10.2 K/UL (ref 4–11)

## 2021-12-18 PROCEDURE — 6370000000 HC RX 637 (ALT 250 FOR IP): Performed by: INTERNAL MEDICINE

## 2021-12-18 PROCEDURE — 36415 COLL VENOUS BLD VENIPUNCTURE: CPT

## 2021-12-18 PROCEDURE — 2700000000 HC OXYGEN THERAPY PER DAY

## 2021-12-18 PROCEDURE — 97530 THERAPEUTIC ACTIVITIES: CPT

## 2021-12-18 PROCEDURE — 97161 PT EVAL LOW COMPLEX 20 MIN: CPT

## 2021-12-18 PROCEDURE — 2580000003 HC RX 258: Performed by: INTERNAL MEDICINE

## 2021-12-18 PROCEDURE — 83735 ASSAY OF MAGNESIUM: CPT

## 2021-12-18 PROCEDURE — 6370000000 HC RX 637 (ALT 250 FOR IP): Performed by: ORTHOPAEDIC SURGERY

## 2021-12-18 PROCEDURE — 85025 COMPLETE CBC W/AUTO DIFF WBC: CPT

## 2021-12-18 PROCEDURE — 94640 AIRWAY INHALATION TREATMENT: CPT

## 2021-12-18 PROCEDURE — 97116 GAIT TRAINING THERAPY: CPT

## 2021-12-18 PROCEDURE — 94761 N-INVAS EAR/PLS OXIMETRY MLT: CPT

## 2021-12-18 PROCEDURE — 97535 SELF CARE MNGMENT TRAINING: CPT

## 2021-12-18 PROCEDURE — 1200000000 HC SEMI PRIVATE

## 2021-12-18 PROCEDURE — 72170 X-RAY EXAM OF PELVIS: CPT

## 2021-12-18 PROCEDURE — 6360000002 HC RX W HCPCS: Performed by: ORTHOPAEDIC SURGERY

## 2021-12-18 PROCEDURE — 99232 SBSQ HOSP IP/OBS MODERATE 35: CPT | Performed by: INTERNAL MEDICINE

## 2021-12-18 PROCEDURE — 80048 BASIC METABOLIC PNL TOTAL CA: CPT

## 2021-12-18 PROCEDURE — 2580000003 HC RX 258: Performed by: ORTHOPAEDIC SURGERY

## 2021-12-18 PROCEDURE — 97165 OT EVAL LOW COMPLEX 30 MIN: CPT

## 2021-12-18 RX ORDER — EZETIMIBE 10 MG/1
10 TABLET ORAL NIGHTLY
Status: DISCONTINUED | OUTPATIENT
Start: 2021-12-18 | End: 2021-12-21 | Stop reason: HOSPADM

## 2021-12-18 RX ORDER — ATORVASTATIN CALCIUM 40 MG/1
80 TABLET, FILM COATED ORAL DAILY
Status: DISCONTINUED | OUTPATIENT
Start: 2021-12-19 | End: 2021-12-21 | Stop reason: HOSPADM

## 2021-12-18 RX ORDER — DONEPEZIL HYDROCHLORIDE 5 MG/1
5 TABLET, FILM COATED ORAL DAILY
COMMUNITY
Start: 2021-07-27

## 2021-12-18 RX ORDER — BUDESONIDE AND FORMOTEROL FUMARATE DIHYDRATE 80; 4.5 UG/1; UG/1
2 AEROSOL RESPIRATORY (INHALATION) 2 TIMES DAILY
Status: DISCONTINUED | OUTPATIENT
Start: 2021-12-18 | End: 2021-12-21 | Stop reason: HOSPADM

## 2021-12-18 RX ORDER — LOSARTAN POTASSIUM 25 MG/1
25 TABLET ORAL DAILY
Status: ON HOLD | COMMUNITY
End: 2022-07-13 | Stop reason: HOSPADM

## 2021-12-18 RX ORDER — ALBUTEROL SULFATE 90 UG/1
2 AEROSOL, METERED RESPIRATORY (INHALATION) EVERY 6 HOURS PRN
Status: DISCONTINUED | OUTPATIENT
Start: 2021-12-18 | End: 2021-12-21 | Stop reason: HOSPADM

## 2021-12-18 RX ORDER — OXYCODONE HYDROCHLORIDE 5 MG/1
2.5 TABLET ORAL EVERY 6 HOURS PRN
Status: DISCONTINUED | OUTPATIENT
Start: 2021-12-18 | End: 2021-12-21 | Stop reason: HOSPADM

## 2021-12-18 RX ORDER — DONEPEZIL HYDROCHLORIDE 5 MG/1
5 TABLET, FILM COATED ORAL NIGHTLY
Status: DISCONTINUED | OUTPATIENT
Start: 2021-12-18 | End: 2021-12-21 | Stop reason: HOSPADM

## 2021-12-18 RX ORDER — EZETIMIBE 10 MG/1
10 TABLET ORAL DAILY
COMMUNITY

## 2021-12-18 RX ADMIN — Medication 2 PUFF: at 20:45

## 2021-12-18 RX ADMIN — DONEPEZIL HYDROCHLORIDE 5 MG: 5 TABLET, FILM COATED ORAL at 20:15

## 2021-12-18 RX ADMIN — ACETAMINOPHEN 650 MG: 325 TABLET ORAL at 16:15

## 2021-12-18 RX ADMIN — IBUPROFEN 400 MG: 400 TABLET, FILM COATED ORAL at 20:15

## 2021-12-18 RX ADMIN — ENOXAPARIN SODIUM 30 MG: 100 INJECTION SUBCUTANEOUS at 09:15

## 2021-12-18 RX ADMIN — EZETIMIBE 10 MG: 10 TABLET ORAL at 20:15

## 2021-12-18 RX ADMIN — Medication 2000 MG: at 05:59

## 2021-12-18 RX ADMIN — SODIUM CHLORIDE: 9 INJECTION, SOLUTION INTRAVENOUS at 02:18

## 2021-12-18 RX ADMIN — Medication 10 ML: at 05:59

## 2021-12-18 RX ADMIN — ATORVASTATIN CALCIUM 40 MG: 40 TABLET, FILM COATED ORAL at 09:14

## 2021-12-18 RX ADMIN — IBUPROFEN 400 MG: 400 TABLET, FILM COATED ORAL at 09:14

## 2021-12-18 RX ADMIN — TRAMADOL HYDROCHLORIDE 50 MG: 50 TABLET, COATED ORAL at 20:15

## 2021-12-18 RX ADMIN — ASPIRIN 81 MG: 81 TABLET, COATED ORAL at 09:14

## 2021-12-18 RX ADMIN — ACETAMINOPHEN 650 MG: 325 TABLET ORAL at 06:02

## 2021-12-18 RX ADMIN — Medication 10 ML: at 20:17

## 2021-12-18 ASSESSMENT — PAIN SCALES - GENERAL
PAINLEVEL_OUTOF10: 6
PAINLEVEL_OUTOF10: 8
PAINLEVEL_OUTOF10: 7
PAINLEVEL_OUTOF10: 4

## 2021-12-18 ASSESSMENT — PAIN DESCRIPTION - DESCRIPTORS
DESCRIPTORS: ACHING;DISCOMFORT
DESCRIPTORS: ACHING

## 2021-12-18 ASSESSMENT — PAIN DESCRIPTION - ONSET
ONSET: ON-GOING
ONSET: ON-GOING

## 2021-12-18 ASSESSMENT — PAIN - FUNCTIONAL ASSESSMENT
PAIN_FUNCTIONAL_ASSESSMENT: PREVENTS OR INTERFERES SOME ACTIVE ACTIVITIES AND ADLS
PAIN_FUNCTIONAL_ASSESSMENT: PREVENTS OR INTERFERES SOME ACTIVE ACTIVITIES AND ADLS

## 2021-12-18 ASSESSMENT — PAIN DESCRIPTION - FREQUENCY
FREQUENCY: INTERMITTENT
FREQUENCY: INTERMITTENT

## 2021-12-18 ASSESSMENT — PAIN DESCRIPTION - PROGRESSION
CLINICAL_PROGRESSION: NOT CHANGED
CLINICAL_PROGRESSION: GRADUALLY WORSENING

## 2021-12-18 ASSESSMENT — PAIN DESCRIPTION - PAIN TYPE
TYPE: SURGICAL PAIN
TYPE: SURGICAL PAIN

## 2021-12-18 ASSESSMENT — PAIN DESCRIPTION - ORIENTATION
ORIENTATION: LEFT
ORIENTATION: LEFT

## 2021-12-18 ASSESSMENT — PAIN DESCRIPTION - LOCATION
LOCATION: HIP
LOCATION: HIP

## 2021-12-18 NOTE — FLOWSHEET NOTE
12/17/21 1943   Vital Signs   Temp 97 °F (36.1 °C)   Temp Source Oral   Pulse 74   Heart Rate Source Monitor   Resp 14   /67   BP Location Right upper arm   Patient Position Semi fowlers   Oxygen Therapy   SpO2 (!) 80 %   O2 Device None (Room air)   Pt found by PCA during VS to be 80% on RA, pt was resting with eyes shut when PCA entered room, but easily roused and A&O x4 at this time, supplemental O2 started at this time, pt now on 4L O2 per NC- will wean as tolerated. Call light within reach, bed alarm in place. Will continue to monitor.   Nola Alvarez RN

## 2021-12-18 NOTE — PROGRESS NOTES
Inpatient Occupational Therapy  Evaluation and Treatment    Unit: [address] [place]  Date:  12/18/2021  Patient Name:    Malcolm Blackwood  Admitting diagnosis:  Left hip pain [M25.552]  Closed fracture of neck of left femur with malunion [S72.002P]  Left displaced femoral neck fracture ([address] [S72.002A]  Injury of left ankle, initial encounter [S99.092A]  Fall from slip, trip, or stumble, initial encounter [W01. 0XXA]  Elbow injury, left, initial encounter [S58.902A]  Admit Date:  12/17/2021  Precautions/Restrictions/WB Status/ Lines/ Wounds/ Oxygen: fall risk, IV, bed/chair alarm, purewick catheter, supplemental O2 (2L) and WB restrictions (LLE WBAT)     Treatment Time:  850-940  Treatment Number: 1     Billable Treatment Time: 40 minutes   Total Treatment Time:   50   minutes    Patient Goals for Therapy:  \" to go home; I will go to rehab if I need to \"      Discharge Recommendations: Home with 24/7 assistance  and [address] [place] [address] needs for discharge: needs met       Therapy recommendations for staff:  Assist of 1 with use of rolling walker and gait belt for all ambulation to/from BSC/chair  to/from bedside commode  to/from bathroom    History of Present Illness: ED note, 12/17/2021, Sandrita Kong:   \" [de-identified] y.o. female  has a past medical history of CVA (cerebral vascular accident) ([facility] [address] [facility] Hyperlipidemia, Hypertension, and Lyme disease. who presents to the ED for fall. Patient states she was packing and she attempted to slap step around a car table and she tripped over the leg of the table. Landed on the left side. Elbow and her left ankle and immediate severe pain to her left hip and difficulty with extending her left leg. She did not hit her head, she is not anticoagulated, did not lose consciousness. \"     12/17/2021 Left hemiarthroplasty     Home Health S4 Level Recommendation:  NA    AM-PAC Score: AM-PAC Inpatient Daily Activity Raw Score: 18  Pt scored a 18/24 on the AM-PAC ADL Inpatient form.  Current research shows that an AM-PAC score of 18 or greater is associated with a discharge to the patient's home setting. Preadmission Environment:    Pt. Lives with family (dtr and son in law). Pt has access to  24/7 assistance by family. Son in law retired. Good health. Home environment:  mobile home/trailer  Steps to enter first floor:  5 steps to enter and hand rail bilateral    Steps to second floor:  N/A  Bathroom: walk in shower, shower chair (bench) , grab bars (has one that can be put up)  and standard toilet  Equipment owned:  [facility]), walker (Rollator), hospital bed and AD for ADLs (reacher)    Preadmission Status / PLOF:  History of falls   Yes  Pt. Able to drive   Yes and short distances only  Pt Fully independent with ADL's  Yes  Pt. Required assistance from family for: Independent PTA  (Family helps PRN)   Pt sleeps in hospital bed. Pt. Fully independent for transfers and gait and walked with: No Device    Pain:  None at rest   Ratin with activity   Location:  Muscle pain in L thigh  Pain Medicine Status:  Received pain med prior to tx      Cognition:    A&O Person, Place, Time and Situation   Able to follow multi step directions, consistently. Subjective:  Pt supine in bed upon therapist arrival. Pt agreeable to work with therapy this date. Upper Extremity ROM:   WFL,  pt able to perform all bed mobility, transfers, and gait without ROM limitation.     Upper Extremity Strength:    BUE strength WFL, but not formally assessed w/ MMT    Upper Extremity Sensation:    WNL    Upper Extremity Proprioception:  WNL    Coordination and Tone:  WNL    Balance:  Functional Sitting Balance:  WFL   Comments: Good  Functional Standing Balance:Diminished   Comments: Good with RW     Bed mobility:    Supine to sit:   supervision  Sit to supine:   Not Tested  Rolling:    Independent  Scooting in sitting:  supervision  Scooting to head of bed:   Not Tested   Bridging:   No    Transfers:    Sit to stand:  CGA  Stand to sit:  CGA  Bed to chair:   CGA and with rolling walker and gait belt   Standard toilet: Not Tested  Bed to [facility]:  Not Tested    Activities of Daily Living:   UB Dressing:   Not Tested  LB Dressing:    maximal assistance (75%)  UB Bathing:  Not Tested  LB Bathing:  Not Tested  Feeding:  Independent  Grooming:   SBA  Toileting:  Not Tested    Activity Tolerance:   Pt completed therapy session with No adverse symptoms noted w/activity   BP (mmHg) HR (bpm) SpO2 (%)   Supine, at rest 122/67 87 97   With activity  88 90     Positioning Needs:   Up in chair, call light and needs in reach. Alarm Set    Exercise / Activities Initiated:   NT    Patient/Family Education:   Role of OT  Recommendations for DC    Assessment of Deficits: Pt seen for Occupational therapy evaluation in acute care setting. Pt demonstrated decreased Activity tolerance, ADLs, IADLs, Balance , Bed mobility and Transfers. Pt functioning below baseline and will likely benefit from skilled occupational therapy services to maximize safety and independence. Goal(s): To be met in 3 Visits:  1). Bed to toilet: supervision    To be met in 5 Visits:  1). Supine to/from Sit:  Independent (has hospital bed at home)   2). Upper Body Bathing:   minimal assistance (25%)  3). Lower Body Bathing:   moderate assistance (50%)  4). Upper Body Dressing:  minimal assistance (25%)  5). Lower Body Dressing:  moderate assistance (50%)  6). Pt to demonstrate UE exs x 15 reps with minimal cues    Rehabilitation Potential:  Good for goals listed above. Strengths for achieving goals include: Pt motivated, PLOF, Family Support and Pt cooperative  Barriers to achieving goals include:  No barriers     Plan: To be seen 3-5 x/wk while in acute care setting for strengthening, bed mobility, transfer training, family/patient education and ADL/IADL retraining.       Yadira Ward, MOT, OTR/L   [id number]         If patient discharges from this facility prior to next visit, this note will serve as the Discharge Summary

## 2021-12-18 NOTE — FLOWSHEET NOTE
12/18/21 0915   Vital Signs   Temp 98.1 °F (36.7 °C)   Temp Source Oral   Pulse 84   Heart Rate Source Monitor   Resp 18   /67   BP Location Right upper arm   Patient Position Semi fowlers   Level of Consciousness Alert (0)   MEWS Score 1   Patient Currently in Pain Yes   Oxygen Therapy   SpO2 97 %   O2 Device None (Room air)       Pt assessment completed, vss, see flow sheet. Pt denies any pain at this time. Pt is alert and oriented x 4.  Federico Murray RN

## 2021-12-18 NOTE — PROGRESS NOTES
Department of Orthopedic Surgery  Physician Assistant   Progress Note    Subjective:       Systemic or Specific Complaints:Pain Control    Objective:     Patient Vitals for the past 24 hrs:   BP Temp Temp src Pulse Resp SpO2   12/18/21 0915 122/67 98.1 °F (36.7 °C) Oral 84 18 97 %   12/18/21 0323 115/65 98 °F (36.7 °C) Oral 71 16 94 %   12/17/21 2159 129/74 97.8 °F (36.6 °C) Oral 65 16 96 %   12/17/21 2055 -- -- -- -- -- 99 %   12/17/21 2045 (!) 98/56 98.1 °F (36.7 °C) Oral 81 14 100 %   12/17/21 1955 -- -- -- -- -- 93 %   12/17/21 1945 -- -- -- -- -- 92 %   12/17/21 1943 130/67 97 °F (36.1 °C) Oral 74 14 (!) 80 %   12/17/21 1813 (!) 143/66 97.1 °F (36.2 °C) Oral 70 14 95 %   12/17/21 1800 (!) 140/65 -- -- 73 12 --   12/17/21 1755 (!) 140/65 -- -- 71 13 94 %   12/17/21 1750 (!) 140/65 -- -- 73 12 97 %   12/17/21 1745 120/67 -- -- 71 12 93 %   12/17/21 1740 120/67 -- -- 77 13 91 %   12/17/21 1735 120/67 -- -- 72 19 --   12/17/21 1730 (!) 143/73 -- -- 70 13 95 %   12/17/21 1725 (!) 143/73 -- -- 72 14 95 %   12/17/21 1720 (!) 143/73 97.6 °F (36.4 °C) Temporal 67 14 96 %   12/17/21 1715 (!) 149/69 -- -- 72 12 96 %   12/17/21 1710 (!) 149/69 -- -- 69 14 97 %   12/17/21 1705 (!) 149/69 -- -- 69 13 98 %   12/17/21 1700 133/65 -- -- 71 13 97 %   12/17/21 1655 (!) 147/60 -- -- 71 11 94 %   12/17/21 1654 -- 97.7 °F (36.5 °C) Temporal -- -- --   12/17/21 1132 (!) 148/75 -- -- -- -- 99 %   12/17/21 1126 -- -- -- -- -- (!) 87 %       General: alert, appears stated age and cooperative   Wound: Wound clean and dry no evidence of infection. , No Drainage, Wound Intact and Positive for Edema   Motion: Painful range of Motion in affected extremity   DVT Exam: No evidence of DVT seen on physical exam.     Additional exam: nvi    Data Review  CBC:   Lab Results   Component Value Date    WBC 10.2 12/18/2021    RBC 3.69 12/18/2021    HGB 11.1 12/18/2021    HCT 33.0 12/18/2021     12/18/2021       Renal:   Lab Results   Component Value Date     12/18/2021    K 3.4 12/18/2021     12/18/2021    CO2 30 12/18/2021    BUN 19 12/18/2021    CREATININE 0.8 12/18/2021    GLUCOSE 140 12/18/2021    CALCIUM 8.5 12/18/2021            Assessment:      left hip bessy. Plan:      1:  PT/OT as able. Okay for WBAT. Plans to go home at discharge and has 24hr care at home already. Likely Monday.   2:  Continue Deep venous thrombosis prophylaxis  3:  Continue Pain Control    VALARIE Freeman

## 2021-12-18 NOTE — PROGRESS NOTES
IM Progress Note    Admit Date:  12/17/2021  1    Interval history:  Fall and left hip fracture    Subjective:  Ms. Hanson Favors seen up in bed, fully awake , alert and oriented  Coloring books. No pain per pt    Objective:   /67   Pulse 84   Temp 98.1 °F (36.7 °C) (Oral)   Resp 18   Ht 5' 1\" (1.549 m)   Wt 117 lb (53.1 kg)   SpO2 97%   BMI 22.11 kg/m²     Intake/Output Summary (Last 24 hours) at 12/18/2021 1326  Last data filed at 12/18/2021 9122  Gross per 24 hour   Intake 7212 ml   Output 1200 ml   Net 6012 ml       Physical Exam:        General:  Elderly female healthy appearing Awake, alert and oriented. Appears to be not in any distress  Mucous Membranes:  Pink , anicteric  Neck: No JVD, no carotid bruit, no thyromegaly  Chest:  Clear to auscultation bilaterally, no added sounds  Cardiovascular:  RRR S1S2 heard, no murmurs or gallops  Abdomen:  Soft, undistended, non tender, no organomegaly, BS present  Extremities: left hip dry dresssing  No edema or cyanosis.  Distal pulses well felt  Neurological : grossly normal        Medications:   Scheduled Medications:    aspirin  81 mg Oral Daily    atorvastatin  40 mg Oral Daily    sodium chloride flush  5-40 mL IntraVENous 2 times per day    sodium chloride flush  10 mL IntraVENous 2 times per day    ibuprofen  400 mg Oral Q12H    enoxaparin  30 mg SubCUTAneous Daily    influenza virus vaccine  0.5 mL IntraMUSCular Prior to discharge     I   sodium chloride      sodium chloride 75 mL/hr at 12/18/21 0218    sodium chloride       sodium chloride flush, sodium chloride, polyethylene glycol, acetaminophen **OR** acetaminophen, HYDROmorphone, traMADol, prochlorperazine, sodium chloride flush, sodium chloride, acetaminophen, oxyCODONE **OR** oxyCODONE, promethazine **OR** ondansetron    Lab Data:  Recent Labs     12/17/21  0448 12/18/21  0528   WBC 17.8* 10.2   HGB 13.2 11.1*   HCT 40.8 33.0*   MCV 89.8 89.4    176     Recent Labs Subjective   Kermit Corley is a 68 y.o. male.   You have chosen to receive care through a telephone visit. Do you consent to use a telephone visit for your medical care today? Yes This visit has been rescheduled as a phone visit to comply with patient safety concerns in accordance with CDC recommendations. Total time of discussion was 15 minutes.     History of Present Illness     Pt presents via telephone encounter for hospital f/u appt.    Admission date: 11/4/20  Discharge date: 11/11/20    Admission dx: Acute copd exacerbation  Discharge dx: advanced copd exacerbation, acute bronchitis, acute on chronic hypoxemic resp failure, thrush.    Pt presented to [facility] ER with c/c of increased dyspnea from baseline, hypoxemia on 2L min nasal canula with SPO2 86%. Nonproductive cough. Recent negative covid-19 testing. He was admitted and treated with IV Solumedrol, Rocephin/Zithromax and Diflucan - He was slow to improve and steroids were gradually weaned. Sputum culture did not grow anything. He was discharged home on a prednisone taper and Doxycycline. He had recent f/u with outpatient pulmonology on 11/17/20, Dr. Saleh. Utilizing advair, incruse, albuterol nebs/inhaler q 4 hours. Recommended pulmonary rehab per pulmonology. Reports has since completed prednisone taper. x2-3 remaining days of doxycycline per pt. Reports significant improvement in dyspnea from d/c from hospital. Reports currently dyspnea at his baseline. CXR 11/4/20 and 11/9/20 - no acute process, chronic changes related to copd and granulomatous scarring. Denies cough worse than baseline, nonproductive in nature. Denies fever, chills, myalgias, rhinorrhea, sore throat, pleuritic chest tightness. Pt reports he has not smoked since Nov 4, 2020. Reports he was recently seen at [facility] ER on  11/18/20 with c/c of dysuria and urinary retention. Dx with acute uti and started on ceftin. Urine culture pending at this time. Has yet to start abx today.  12/17/21  0448 12/18/21  0528    138   K 3.3* 3.4*   CL 97* 100   CO2 34* 30   BUN 32* 19   CREATININE 1.2 0.8     No results for input(s): CKTOTAL, CKMB, CKMBINDEX, TROPONINI in the last 72 hours. Coagulation: No results found for: INR, APTT  Cardiac markers:   Lab Results   Component Value Date    TROPONINI <0.01 06/23/2020         Lab Results   Component Value Date    ALT 20 12/17/2021    AST 30 12/17/2021    ALKPHOS 136 (H) 12/17/2021    BILITOT 0.3 12/17/2021       No results found for: INR, PROTIME    Radiology      EKG:    Normal sinus rhythm  Possible Left atrial enlargement  Left ventricular hypertrophy  Cannot rule out Septal infarct (cited on or before 23-JUN-2020)  Prolonged QT  Abnormal ECG  When compared with ECG of 23-JUN-2020 18:09,  Premature ventricular complexes are no longer Present  Confirmed by Nano Decker MD, [address] ([id number]) on 12/17/2021 7:28:44 AM     RADIOLOGY  XR ELBOW LEFT (2 VIEWS)   Final Result   No acute osseous abnormality identified in the elbow.           XR HIP 2-3 VW W PELVIS LEFT   Final Result   Left femoral neck fracture with proximal migration of the femoral shaft   relative to the hip joint.           XR ANKLE LEFT (2 VIEWS)   Final Result   Osteopenia, with no acute osseous fracture.              Pertinent results reviewed:  LE Duplex Bilateral 11/16/21     FINAL IMPRESSIONS   1. Elevated velocities are noted at the proximal anastomosis of the right    to left femoral bypass graft, consistent with a greater than 50% stenosis. 2. The ankle/brachial index in the right lower extremity suggests mild    arterial insufficiency at rest.   3. Abnormal monophasic doppler spectra at the right femoral level suggest    significant arterial occlusive disease involving the inflow vessels of    the right lower extremity. 4. There is significant velocity elevation noted in the right distal    common femoral artery, consistent with a greater than 50% stenosis.    5. There is significant Reports dysuria, urinary hesitancy.     The following portions of the patient's history were reviewed and updated as appropriate: allergies, current medications, past family history, past medical history, past social history, past surgical history and problem list.    Review of Systems   Constitutional: Positive for fatigue. Negative for activity change, appetite change, chills, diaphoresis, fever, unexpected weight gain and unexpected weight loss.   HENT: Negative for congestion, dental problem, drooling, ear discharge, ear pain, postnasal drip, rhinorrhea, sinus pressure, sneezing, sore throat, swollen glands, tinnitus, trouble swallowing and voice change.    Eyes: Negative for blurred vision, double vision and visual disturbance.   Respiratory: Positive for cough (chronic), shortness of breath (baseline) and wheezing (baseline). Negative for apnea, choking, chest tightness and stridor.    Cardiovascular: Negative for chest pain, palpitations and leg swelling.   Gastrointestinal: Negative for abdominal distention, abdominal pain, constipation, diarrhea, nausea, vomiting, GERD and indigestion.   Genitourinary: Positive for decreased urine volume, difficulty urinating, dysuria, frequency and urgency. Negative for discharge, flank pain, genital sores, hematuria, nocturia, penile pain, erectile dysfunction, penile swelling, scrotal swelling, testicular pain and urinary incontinence.   Musculoskeletal: Negative for myalgias, neck pain and neck stiffness.   Skin: Negative.  Negative for rash.   Neurological: Negative for dizziness, weakness, light-headedness, headache and confusion.   Psychiatric/Behavioral: Negative.        Objective   Physical Exam   Constitutional: He is oriented to person, place, and time. No distress.   Cardiovascular: Normal pulse (patient directed exam).      Pulmonary/Chest: No stridor.  No respiratory distress.Use of oxygen by  nasal cannula noted. He Audible wheeze noted...He exhibits no  tenderness.   Abdominal: Soft. He exhibits no distension. There is no abdominal tenderness.   Musculoskeletal:         General: No edema.   Lymphadenopathy:     He has no cervical adenopathy.   Neurological: He is alert and oriented to person, place, and time.   Skin: Skin is warm and dry. No rash noted. He is not diaphoretic. No erythema. No pallor.   Psychiatric: He has a normal mood and affect.        PE limited d/t telehealth encounter.      Assessment/Plan   Diagnoses and all orders for this visit:    1. Acute exacerbation of chronic obstructive pulmonary disease (COPD) (CMS/HCC) (Primary)    2. Chronic bronchitis with COPD (chronic obstructive pulmonary disease) (CMS/HCC)    3. Chronic respiratory failure with hypercapnia (CMS/[facility])    4. COPD, severe (CMS/HCC)    5. Requires supplemental oxygen    6. Dysuria    7. Hospital discharge follow-up        Acute copd exacerbation - resolved. CXR 11/4/20 and 11/9/20 - no acute process, chronic changes related to copd and granulomatous scarring. Advised to continue bronchodilators as prescribed. Continue doxycycline as prescribed until completion.  Continue 2L O2 continuous oxygen supplementation. Continue mucinex otc as directed. Advised to alternate tylenol/ibuprofen otc q 4-6 hours prn for mild pain/fever.  Discussed sleeping propped up for improvement in dyspnea. Discussed compliance with wearing supplemental oxygen. Discussed concerning s/s to immediately rtc for recheck or go to ER for evaluation and tx including but not limited to chest pain, worsening dyspnea, hemoptysis, fever, chills, decreased I&O, AMS. Pt verbalized understanding.     COPD, chronic respiratory failure with hypercapnia - managed with albuterol, advair, singulair, incruse ellipta, 2L continuous supplemental oxygen. Following with Dr. Saleh, pulmonology, scheduled for pulmonary rehab.    Tobacco dependence - report cessation x 2.5 weeks. Congratulated on tobacco cessation.      Dysuria -  velocity elevation noted in the right mid    superficial femoral artery, consistent with a greater than 50% stenosis. 6. Monophasic flow is noted in the right tibial vessels. 7. The ankle/brachial index in the left lower extremity is within normal    limits, suggesting no evidence of arterial insufficiency at rest.   8. Normal multiphasic doppler spectra at the left femoral level suggest no    evidence of significant inflow occlusive disease in the left lower    extremity. 9. The left femoral to popliteal artery bypass graft is patent. However,    Peak systolic velocities below 45 cm/sec in the bypass graft may suggest    impending graft failure. 10. Three vessel runoff is noted in the left lower extremity. 11. Exam suggests a progression of arterial occlusive disease in the right    lower extremity, relative to the previous exam of 5/20/2021. Full report view including tables and additional findings is available in    the link below.     Carotid Duplex Bilateral 11/16/21     FINAL IMPRESSIONS   1. 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 2. Estimated diameter reduction of the left internal carotid artery is    50-69%. 3. The bilateral common and external carotid arteries reveal no    significant stenosis. 4. The bilateral vertebral arteries are patent with antegrade flow.    5. There is significant progression of occlusive disease in the right    internal carotid artery, relative to the previous ultrasound exam of    5/21/2020.      CT abdomen angio w/ runoff 6/16/21  IMPRESSION:     RIGHT LOWER EXTREMITY:     1.  STABLE APPEARANCE OF MILD IRREGULARITY OF THE PROXIMAL RIGHT SFA AND MODERATE IRREGULARITY OF THE DISTAL SFA   2.  PATENT THREE-VESSEL RUNOFF     LEFT LOWER EXTREMITY:     1.  PATENT RIGHT TO LEFT FEMORAL-FEMORAL BYPASS GRAFT   2.  PATENT LEFT FEMORAL-POPLITEAL BYPASS GRAFT   3.  PATENT TWO-VESSEL RUNOFF     OTHER:     1.  STABLE SMALL SACCULAR ANEURYSM IN THE DISTAL ABDOMINAL AORTA AT THE BIFURCATION   2.  50% OSTIAL STENOSIS OF THE RIGHT RENAL ARTERY   3.  EXTENSIVE SIGMOID DIVERTICULOSIS   4.  1.5 CM GALLSTONE      ECHO 4/16/20 [facility]     Study Conclusions   - Left ventricle: The cavity size is normal. There is mild     concentric hypertrophy. Systolic function was mildly reduced. The     estimated ejection fraction was in the range of 47% to 51%.     Hypokinesis of the inferior myocardium. The stroke volume is     90ml. The stroke index is 58ml/m^2. - Aortic valve: Cusp separation was reduced. Transvalvular velocity     is increased. There is mild stenosis. There was mild to moderate     regurgitation. The mean systolic gradient is 58KG Hg. The peak     systolic gradient is 80HB Hg. The valve area by the velocity-time     integral method is 1.7cm^2. The valve area index by the     velocity-time integral method is 1.07cm^2/m^2. - Mitral valve: The annulus is moderately calcified. - Left atrium: The atrium is markedly dilated. - Inferior vena cava:  The vessel was normal in size; the     respirophasic diameter changes were in the normal range (&gt;= 50%);     findings are consistent with normal central venous pressure.            Assessment & Plan:      #Left femoral neck fracture  -Mechanical fall at home   -POD 1 sp left bessy hip arthroplasty   - pain well controlled  - avoid excessive pain meds  - start IS , start PT  - dvt prophylaxis       #HTN  -BP stable  - resume BB, hold norvasc, losartan      #COPD no AE  -No home O2 required  - resume home inhalers     #AAA without rupture  -Seen on recent CT  -Stable     #Bilateral carotid stenosis  #Peripheral vascular disease s/p femoral to femoral and femoral to pop bypass grafts  - on ASA      #HLD  -Continue statin and zetia     #History of CVA  -No residual deficits noted     Mild dementia - stable on aricept    DVT Prophylaxis: Lovenox  Diet: Diet regular   Code Status: Full Code          Andrew recently seen at [facility] ER on 11/18/20 and dx with acute uti. Urine culture pending at this time. Increase fluid intake. Begin ceftin as prescribed per ER. avoid bladder irritants, including caffeine. Maintain good I&O. If you are unable to urinate q 6-8 hours RTC or present to ER for further evaluation and tx. Discussed additional concerning s/s to immediately present to care or return to ER for evaluation and assessment. Pt verbalized understanding.    Patient educated to follow up in 4 days or sooner than next scheduled appointment if symptoms worsen or do not improve. Patient stated understanding and has agreed with plan of care. After visit summary was printed and given to patient.      This document has been electronically signed by Anjali Reyes PA-C on November 19, 2020 09:32 CST,.        Mis Davis MD, 12/18/2021 1:26 PM

## 2021-12-18 NOTE — PROGRESS NOTES
Inpatient Physical Therapy Evaluation and Treatment    Unit: [facility]  Date:  12/18/2021  Patient Name:    Latricia Sandoval  Admitting diagnosis:  Left hip pain [M25.552]  Closed fracture of neck of left femur with malunion [S72.002P]  Left displaced femoral neck fracture ([address] [S72.002A]  Injury of left ankle, initial encounter [S99.912A]  Fall from slip, trip, or stumble, initial encounter [W01. 0XXA]  Elbow injury, left, initial encounter [S59.902A]  Admit Date:  12/17/2021  Precautions/Restrictions/WB Status/ Lines/ Wounds/ Oxygen: Fall risk, Bed/chair alarm, Lines -IV and WB Restrictions (FWBAT LLE)  Watch Spo2 with activity  Treatment Time:  8:50-9:40  Treatment Number:  1   Timed Code Treatment Minutes: 40 minutes  Total Treatment Minutes:  50 minutes    Patient Goals for Therapy: \" to go home,I will fo to rehab if I need to \"          Discharge Recommendations: Home 24 hr assist and with home PT   DME needs for discharge: [facility] if needed       Therapy recommendation for EMS Transport: May require transport home if unable to ambulate up/down stairs    Therapy recommendations for staff:   Assist of 1 with use of rolling walker (RW) and gait belt for all transfers and ambulation to/from [facility]  to/from chair  to/from bathroom    History of Present Illness: ED note, 12/17/2021, Jose Beards:   \" [de-identified] y.o. female  has a past medical history of CVA (cerebral vascular accident) ([facility] [address] Hyperlipidemia, Hypertension, and Lyme disease. who presents to the ED for fall.  Patient states she was packing and she attempted to slap step around a car table and she tripped over the leg of the table.  Landed on the left side.  Elbow and her left ankle and immediate severe pain to her left hip and difficulty with extending her left leg.  She did not hit her head, she is not anticoagulated, did not lose consciousness.  \"      12/17/2021      Left hemiarthroplasty       Home Health S4 Level Recommendation:  Level 1 Standard  AM-PAC Mobility Score AM-PAC Inpatient Mobility Raw Score : [facility] Hsieh scored a 18/24 on the AM-PAC short mobility form. Current research shows that an AM-PAC score of 18 or greater is typically associated with a discharge to the patient's home setting. If patient discharges prior to next session this note will serve as a discharge summary. Please see below for the latest assessment towards goals.        Preadmission Environment:    Pt. Lives with family (dtr and son in law). Pt has access to  24/7 assistance by family. Son in law retired. Good health. Home environment:  mobile home/trailer  Steps to enter first floor:  5 steps to enter and hand rail bilateral        Steps to second floor:  N/A  Bathroom: walk in shower, shower chair (bench) , grab bars (has one that can be put up)  and standard toilet  Equipment owned:  [facility]), walker (Rollator), hospital bed and AD for ADLs (reacher)     Preadmission Status / PLOF:  History of falls             Yes  Pt. Able to drive          Yes and short distances only  Pt Fully independent with ADL's         Yes  Pt. Required assistance from family for: Independent PTA  (Family helps PRN)   Pt sleeps in hospital bed. Pt. Fully independent for transfers and gait and walked with: No Device    Pain   none at rest, 6 with activity  Location: L thigh  Pain Medicine Status: Received pain med prior to tx    Cognition    A&O x4   Able to follow 2 step commands    Subjective  Patient lying supine in bed with no family present. Pt agreeable to this PT eval & tx. Upper Extremity ROM/Strength  Please see OT evaluation.       Lower Extremity ROM / Strength   AROM WFL: No  ROM limitations: mild limitation LLE due to pain    Strength Assessment (measured on a 0-5 scale):  R LE   Quad   4+   Ant Tib  4+   Hamstring 4+   Iliopsoas 4+  L LE  Quad   3   Ant Tib  3+   Hamstring 3   Iliopsoas 3-      Lower Extremity Sensation    WFL    Lower Extremity Proprioception:   NT    Coordination and Tone  NT    Balance  Sitting:  Normal; Independent  Comments:     Standing: Good ; CGA  Comments: using RW  Bed mobility:    Supine to sit:                           supervision  Sit to supine:                           Not Tested  Rolling:                                    Independent  Scooting in sitting:                   supervision  Scooting to head of bed:         Not Tested        Bridging: No     Transfers:    Sit to stand:                 CGA  Stand to sit:                 CGA  Bed to chair:                CGA and with rolling walker and gait belt   Standard toilet:            Not Tested  Bed to [facility]:                Not Tested       Gait gait completed as indicated below  Distance:      4 ft  Deviations (firm surface/linoleum):  decreased jalen, step to pattern and decreased step length on left  Assistive Device Used:    gait belt and rolling walker (RW)  Level of Assist:    CGA  Comment: no LOB,steady,good sequencing, declined further ambulation due to [address] [place] Training deferred, pt unsafe/ not appropriate to complete stairs at this time    Activity Tolerance:   Pt completed therapy session with No adverse symptoms noted w/activity    BP (mmHg) HR (bpm) SpO2 (%)   Supine, at rest 122/67 87 97   With activity   88 90         Positioning Needs   Pt up in chair, alarm set, positioned in proper neutral alignment and pressure relief provided. Call light provided and all needs within reach      Other  RN aware of level of asssist with transfers an to monitor Spo2 with activity    Patient/Family Education   Pt educated on role of inpatient PT, POC, importance of continued activity, DC recommendations, safety awareness, transfer techniques and calling for assist with mobility. Assessment  Pt seen for Physical Therapy evaluation in acute care setting.   Pt demonstrated decreased Activity tolerance, ROM and Strength as well as decreased independence with Ambulation and Transfers. Patient will benefit from skilled PT to maximize mobility. May require transport home if unable to complete  stairs  Recommending Home 24 hr assist and with home PT upon discharge as patient functioning would benefit from continued therapy services    Goals : To be met in 3 visits:  1). Independent with LE Ex x 10 reps  2.) Bed to chair: SBA    To be met in 6 visits:  1). Supine to/from sit: Independent  2). Sit to/from stand: Independent  3). Bed to chair: Independent  4). Gait: Ambulate 125 ft.   with  SBA and use of rolling walker (RW)  5). Tolerate B LE exercises 3 sets of 10-15 reps  6). Ascend/descend 5 steps with Min A  with use of hand rail bilateral and LRAD (least restrictive assistive device)    Rehabilitation Potential: Good  Strengths for achieving goals include:   Pt motivated, PLOF, Family Support and Pt cooperative   Barriers to achieving goals include:    No Barriers    Plan    To be seen 5x / week  while in acute care setting for therapeutic exercises, bed mobility, transfers, progressive gait training, balance training, and family/patient education. Signature: Hal Wadsworth, PT #[id number]     If patient discharges from this facility prior to next visit, this note will serve as the Discharge Summary.

## 2021-12-18 NOTE — PROGRESS NOTES
RT Inhaler-Nebulizer Bronchodilator Protocol Note    There is a bronchodilator order in the chart from a provider indicating to follow the RT Bronchodilator Protocol and there is an Initiate RT Inhaler-Nebulizer Bronchodilator Protocol order as well (see protocol at bottom of note). CXR Findings:  XR CHEST PORTABLE    Result Date: 12/17/2021  No acute cardiopulmonary disease. The findings from the last RT Protocol Assessment were as follows:   History Pulmonary Disease: (P) Smoker 15 pack years or more  Respiratory Pattern: (P) Regular pattern and RR 12-20 bpm  Breath Sounds: (P) Clear breath sounds  Cough: (P) Strong, spontaneous, non-productive  Indication for Bronchodilator Therapy: (P) None  Bronchodilator Assessment Score: (P) 1    Aerosolized bronchodilator medication orders have been revised according to the RT Inhaler-Nebulizer Bronchodilator Protocol below. Respiratory Therapist to perform RT Therapy Protocol Assessment initially then follow the protocol. Repeat RT Therapy Protocol Assessment PRN for score 0-3 or on second treatment, BID, and PRN for scores above 3. No Indications - adjust the frequency to every 6 hours PRN wheezing or bronchospasm, if no treatments needed after 48 hours then discontinue using Per Protocol order mode. If indication present, adjust the RT bronchodilator orders based on the Bronchodilator Assessment Score as indicated below. Use Inhaler orders unless patient has one or more of the following: on home nebulizer, not able to hold breath for 10 seconds, is not alert and oriented, cannot activate and use MDI correctly, or respiratory rate 25 breaths per minute or more, then use the equivalent nebulizer order(s) with same Frequency and PRN reasons based on the score. If a patient is on this medication at home then do not decrease Frequency below that used at home.     0-3 - enter or revise RT bronchodilator order(s) to equivalent RT Bronchodilator order with Frequency of every 4 hours PRN for wheezing or increased work of breathing using Per Protocol order mode. 4-6 - enter or revise RT Bronchodilator order(s) to two equivalent RT bronchodilator orders with one order with BID Frequency and one order with Frequency of every 4 hours PRN wheezing or increased work of breathing using Per Protocol order mode. 7-10 - enter or revise RT Bronchodilator order(s) to two equivalent RT bronchodilator orders with one order with TID Frequency and one order with Frequency of every 4 hours PRN wheezing or increased work of breathing using Per Protocol order mode. 11-13 - enter or revise RT Bronchodilator order(s) to one equivalent RT bronchodilator order with QID Frequency and an Albuterol order with Frequency of every 4 hours PRN wheezing or increased work of breathing using Per Protocol order mode. Greater than 13 - enter or revise RT Bronchodilator order(s) to one equivalent RT bronchodilator order with every 4 hours Frequency and an Albuterol order with Frequency of every 2 hours PRN wheezing or increased work of breathing using Per Protocol order mode.          Electronically signed by Cory Church RCP on 12/18/2021 at 4:46 PM

## 2021-12-19 PROCEDURE — 97116 GAIT TRAINING THERAPY: CPT

## 2021-12-19 PROCEDURE — 99239 HOSP IP/OBS DSCHRG MGMT >30: CPT | Performed by: INTERNAL MEDICINE

## 2021-12-19 PROCEDURE — 6370000000 HC RX 637 (ALT 250 FOR IP): Performed by: ORTHOPAEDIC SURGERY

## 2021-12-19 PROCEDURE — 94761 N-INVAS EAR/PLS OXIMETRY MLT: CPT

## 2021-12-19 PROCEDURE — 2580000003 HC RX 258: Performed by: ORTHOPAEDIC SURGERY

## 2021-12-19 PROCEDURE — 6370000000 HC RX 637 (ALT 250 FOR IP): Performed by: INTERNAL MEDICINE

## 2021-12-19 PROCEDURE — 94640 AIRWAY INHALATION TREATMENT: CPT

## 2021-12-19 PROCEDURE — 1200000000 HC SEMI PRIVATE

## 2021-12-19 PROCEDURE — 6360000002 HC RX W HCPCS: Performed by: INTERNAL MEDICINE

## 2021-12-19 PROCEDURE — 99024 POSTOP FOLLOW-UP VISIT: CPT | Performed by: ORTHOPAEDIC SURGERY

## 2021-12-19 RX ORDER — POLYETHYLENE GLYCOL 3350 17 G/17G
17 POWDER, FOR SOLUTION ORAL DAILY PRN
Qty: 30 EACH | Refills: 0
Start: 2021-12-19 | End: 2022-01-18

## 2021-12-19 RX ORDER — OXYCODONE HYDROCHLORIDE 5 MG/1
2.5 TABLET ORAL EVERY 6 HOURS PRN
Qty: 10 TABLET | Refills: 0 | Status: SHIPPED | OUTPATIENT
Start: 2021-12-19 | End: 2021-12-24

## 2021-12-19 RX ADMIN — EZETIMIBE 10 MG: 10 TABLET ORAL at 20:41

## 2021-12-19 RX ADMIN — IBUPROFEN 400 MG: 400 TABLET, FILM COATED ORAL at 20:40

## 2021-12-19 RX ADMIN — OXYCODONE 2.5 MG: 5 TABLET ORAL at 00:36

## 2021-12-19 RX ADMIN — METOPROLOL TARTRATE 25 MG: 25 TABLET, FILM COATED ORAL at 20:40

## 2021-12-19 RX ADMIN — ENOXAPARIN SODIUM 40 MG: 100 INJECTION SUBCUTANEOUS at 08:37

## 2021-12-19 RX ADMIN — SODIUM CHLORIDE, PRESERVATIVE FREE 10 ML: 5 INJECTION INTRAVENOUS at 09:00

## 2021-12-19 RX ADMIN — OXYCODONE 2.5 MG: 5 TABLET ORAL at 13:11

## 2021-12-19 RX ADMIN — ACETAMINOPHEN 650 MG: 325 TABLET ORAL at 21:59

## 2021-12-19 RX ADMIN — ACETAMINOPHEN 650 MG: 325 TABLET ORAL at 00:36

## 2021-12-19 RX ADMIN — METOPROLOL TARTRATE 25 MG: 25 TABLET, FILM COATED ORAL at 08:37

## 2021-12-19 RX ADMIN — TRAMADOL HYDROCHLORIDE 50 MG: 50 TABLET, COATED ORAL at 02:20

## 2021-12-19 RX ADMIN — SODIUM CHLORIDE, PRESERVATIVE FREE 10 ML: 5 INJECTION INTRAVENOUS at 20:41

## 2021-12-19 RX ADMIN — TRAMADOL HYDROCHLORIDE 50 MG: 50 TABLET, COATED ORAL at 20:40

## 2021-12-19 RX ADMIN — IBUPROFEN 400 MG: 400 TABLET, FILM COATED ORAL at 08:34

## 2021-12-19 RX ADMIN — ATORVASTATIN CALCIUM 80 MG: 40 TABLET, FILM COATED ORAL at 08:37

## 2021-12-19 RX ADMIN — DONEPEZIL HYDROCHLORIDE 5 MG: 5 TABLET, FILM COATED ORAL at 20:41

## 2021-12-19 RX ADMIN — Medication 2 PUFF: at 07:06

## 2021-12-19 RX ADMIN — OXYCODONE 2.5 MG: 5 TABLET ORAL at 21:58

## 2021-12-19 RX ADMIN — ACETAMINOPHEN 650 MG: 325 TABLET ORAL at 08:36

## 2021-12-19 RX ADMIN — ASPIRIN 81 MG: 81 TABLET, COATED ORAL at 08:37

## 2021-12-19 ASSESSMENT — PAIN SCALES - GENERAL
PAINLEVEL_OUTOF10: 7
PAINLEVEL_OUTOF10: 5
PAINLEVEL_OUTOF10: 8
PAINLEVEL_OUTOF10: 8
PAINLEVEL_OUTOF10: 5
PAINLEVEL_OUTOF10: 6
PAINLEVEL_OUTOF10: 8
PAINLEVEL_OUTOF10: 8
PAINLEVEL_OUTOF10: 6

## 2021-12-19 ASSESSMENT — PAIN - FUNCTIONAL ASSESSMENT
PAIN_FUNCTIONAL_ASSESSMENT: PREVENTS OR INTERFERES SOME ACTIVE ACTIVITIES AND ADLS

## 2021-12-19 ASSESSMENT — PAIN DESCRIPTION - ONSET
ONSET: ON-GOING

## 2021-12-19 ASSESSMENT — PAIN DESCRIPTION - LOCATION
LOCATION: HIP

## 2021-12-19 ASSESSMENT — PAIN DESCRIPTION - DESCRIPTORS
DESCRIPTORS: ACHING;DISCOMFORT

## 2021-12-19 ASSESSMENT — PAIN DESCRIPTION - FREQUENCY
FREQUENCY: CONTINUOUS

## 2021-12-19 ASSESSMENT — PAIN DESCRIPTION - ORIENTATION
ORIENTATION: LEFT

## 2021-12-19 ASSESSMENT — PAIN DESCRIPTION - PAIN TYPE
TYPE: SURGICAL PAIN

## 2021-12-19 ASSESSMENT — PAIN DESCRIPTION - PROGRESSION
CLINICAL_PROGRESSION: GRADUALLY WORSENING
CLINICAL_PROGRESSION: NOT CHANGED
CLINICAL_PROGRESSION: NOT CHANGED

## 2021-12-19 NOTE — CARE COORDINATION
Writer spoke with pt's Brandon Chandler, via TC and family requests [facility] for STR. Referral called to Nicolle Finney and she states not in network with [employer] and per daughter pt will be [employer] on 01/22. Pt agreeable to STR. Per daughter unable to meet care needs at home until pt gets stronger with STR. Second choice is [facility]. Referral called to [facility] with [facility], awaiting call back. Renea aware pt is d/c ready. Following. [address]assiter spoke with Renea with [facility] and she cannot accept the pt as not in network with [facility]. Follow. Per [facility] does accept [facility]. Writer spoke with pt via TC and she would like CM to verify with her daughter prior to making referral to [facility]. CM left vm for Allison Bigger, awaiting call back from family. Follow.

## 2021-12-19 NOTE — PROGRESS NOTES
ondansetron    Lab Data:  Recent Labs     12/17/21  0448 12/18/21  0528   WBC 17.8* 10.2   HGB 13.2 11.1*   HCT 40.8 33.0*   MCV 89.8 89.4    176     Recent Labs     12/17/21  0448 12/18/21  0528    138   K 3.3* 3.4*   CL 97* 100   CO2 34* 30   BUN 32* 19   CREATININE 1.2 0.8     No results for input(s): CKTOTAL, CKMB, CKMBINDEX, TROPONINI in the last 72 hours. Coagulation: No results found for: INR, APTT  Cardiac markers:   Lab Results   Component Value Date    TROPONINI <0.01 06/23/2020         Lab Results   Component Value Date    ALT 20 12/17/2021    AST 30 12/17/2021    ALKPHOS 136 (H) 12/17/2021    BILITOT 0.3 12/17/2021       No results found for: INR, PROTIME    Radiology      EKG:    Normal sinus rhythm  Possible Left atrial enlargement  Left ventricular hypertrophy  Cannot rule out Septal infarct (cited on or before 23-JUN-2020)  Prolonged QT  Abnormal ECG  When compared with ECG of 23-JUN-2020 18:09,  Premature ventricular complexes are no longer Present  Confirmed by Vane Chirinos MD, [address] ([id number]) on 12/17/2021 7:28:44 AM     RADIOLOGY  XR ELBOW LEFT (2 VIEWS)   Final Result   No acute osseous abnormality identified in the elbow.           XR HIP 2-3 VW W PELVIS LEFT   Final Result   Left femoral neck fracture with proximal migration of the femoral shaft   relative to the hip joint.           XR ANKLE LEFT (2 VIEWS)   Final Result   Osteopenia, with no acute osseous fracture.              Pertinent results reviewed:  LE Duplex Bilateral 11/16/21     FINAL IMPRESSIONS   1. Elevated velocities are noted at the proximal anastomosis of the right    to left femoral bypass graft, consistent with a greater than 50% stenosis.    2. The ankle/brachial index in the right lower extremity suggests mild    arterial insufficiency at rest.   3. Abnormal monophasic doppler spectra at the right femoral level suggest    significant arterial occlusive disease involving the inflow vessels of    the right lower extremity. 4. There is significant velocity elevation noted in the right distal    common femoral artery, consistent with a greater than 50% stenosis. 5. There is significant velocity elevation noted in the right mid    superficial femoral artery, consistent with a greater than 50% stenosis. 6. Monophasic flow is noted in the right tibial vessels. 7. The ankle/brachial index in the left lower extremity is within normal    limits, suggesting no evidence of arterial insufficiency at rest.   8. Normal multiphasic doppler spectra at the left femoral level suggest no    evidence of significant inflow occlusive disease in the left lower    extremity. 9. The left femoral to popliteal artery bypass graft is patent. However,    Peak systolic velocities below 45 cm/sec in the bypass graft may suggest    impending graft failure. 10. Three vessel runoff is noted in the left lower extremity. 11. Exam suggests a progression of arterial occlusive disease in the right    lower extremity, relative to the previous exam of 5/20/2021. Full report view including tables and additional findings is available in    the link below.     Carotid Duplex Bilateral 11/16/21     FINAL IMPRESSIONS   1. 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 2. Estimated diameter reduction of the left internal carotid artery is    50-69%. 3. The bilateral common and external carotid arteries reveal no    significant stenosis. 4. The bilateral vertebral arteries are patent with antegrade flow.    5. There is significant progression of occlusive disease in the right    internal carotid artery, relative to the previous ultrasound exam of    5/21/2020.      CT abdomen angio w/ runoff 6/16/21  IMPRESSION:     RIGHT LOWER EXTREMITY:     1.  STABLE APPEARANCE OF MILD IRREGULARITY OF THE PROXIMAL RIGHT SFA AND MODERATE IRREGULARITY OF THE DISTAL SFA   2.  PATENT THREE-VESSEL RUNOFF     LEFT LOWER EXTREMITY:     1.    #HLD  -Continue statin and zetia     #History of CVA  -No residual deficits noted     Mild dementia - stable on aricept    DVT Prophylaxis: Lovenox  Diet: Diet regular   Code Status: Full Code     Dc planning ok       Lauren Whelan MD, 12/19/2021 10:49 AM

## 2021-12-19 NOTE — PROGRESS NOTES
Inpatient Physical Therapy Daily Treatment Note    Unit: [facility]  Date:  12/19/2021  Patient Name:    Malcolm Blackwood  Admitting diagnosis:  Left hip pain [M25.552]  Closed fracture of neck of left femur with malunion [S72.002P]  Left displaced femoral neck fracture ([address] [S72.002A]  Injury of left ankle, initial encounter [S99.912A]  Fall from slip, trip, or stumble, initial encounter [W01. 0XXA]  Elbow injury, left, initial encounter [S51.902A]  Admit Date:  12/17/2021  Precautions/Restrictions:  Fall risk and WB Restrictions (WBAT L LE)      Discharge Recommendations: Home 24 hr assist and with home PT   DME needs for discharge: Needs Met       Therapy recommendation for EMS Transport: can transport by wheelchair    Therapy recommendations for staff:   Stand by assist with use of rolling walker (RW) and gait belt for all transfers and ambulation to/from [facility]  to/from chair  to/from bathroom  within room  within halls    History of Present Illness:  ED note, 12/17/2021, Sandrita eller:   \" 81 y.o. female  has a past medical history of CVA (cerebral vascular accident) ([facility] [address] Hyperlipidemia, Hypertension, and Lyme disease. who presents to the ED for fall.  Patient states she was packing and she attempted to slap step around a car table and she tripped over the leg of the table.  Landed on the left side.  Elbow and her left ankle and immediate severe pain to her left hip and difficulty with extending her left leg.  She did not hit her head, she is not anticoagulated, did not lose consciousness. \"      12/17/2021      Left hemiarthroplasty     Home Health S4 Level Recommendation: Level 1 Standard  AM-PAC Mobility Score   AM-PAC Inpatient Mobility Raw Score : 18       Treatment Time:  0913-9029  Treatment number: 2  Timed Code Treatment Minutes: 19 minutes  Total Treatment Minutes:  19  minutes    Cognition    A&O x4   Able to follow 2 step commands    Subjective  Patient lying supine in bed with no family present   Pt agreeable to this PT tx. Pain   Yes  Location: L lateral thigh  Rating:    moderate and severe/10  Pain Medicine Status: Received pain med prior to tx - reports she received pain meds earlier this morning. Bed Mobility   Supine to Sit:    Modified Independent and Supervision  Sit to Supine:   Not Tested  Rolling:   Modified Independent  Scooting:   Modified Independent and Supervision    Transfer Training     Sit to stand:   Supervision  Stand to sit:   Supervision  Bed to Chair:   SBA with use of gait belt and rolling walker (RW)    Gait Training gait completed as indicated below  Distance:      ~175 ft  Deviations (firm surface/linoleum):  step to pattern, antalgic pattern and decreased step length on left  Assistive Device Used:    gait belt and rolling walker (RW)  Level of Assist:    SBA  Comment: Pt unable to perform step-through gait pattern but was otherwise steady on her feet with ambulation. Stair Training stairs completed as indicated below  # of Steps:   1 x 6 reps  Level of Assist:  CGA  UE Support:  NA  Assistive Device:  RW  Pattern:   non-reciprocal pattern  Comments: Pt able to navigate up/down stairs without increase in L hip pain and with good stability. Therapeutic Exercise Henry deferred secondary to treatment focus on functional mobility  NA    Balance  Sitting:  Normal; Independent  Comments:     Standing: Good ; Supervision  Comments:     Patient Education      Role of PT, POC, Discharge recommendations, DC recommendations, safety awareness, pacing activity and calling for assist with mobility. Positioning Needs       Pt up in chair, no alarm needed, positioned in proper neutral alignment and pressure relief provided. Call light provided and all needs within reach    ROM Measurements N/A  Knee Flexion:  Knee Extension:     Activity Tolerance   Pt completed therapy session with No adverse symptoms noted w/activity.     Vitals at rest, beginning of session:  HR: 75 BPM  SpO2: 96% Other      Assessment :  Patient did well with practicing stair climbing today, demonstrated good stability and safety awareness with activity. Her walking/standing activity tolerance is also continuing to improve. Recommending Home 24 hr assist and with home PT upon discharge as patient functioning close to baseline level and would benefit from continued therapy services    Goals (all goals ongoing unless otherwise indicated)  To be met in 3 visits:  1). Independent with LE Ex x 10 reps  2.) Bed to chair: SBA     To be met in 6 visits:  1). Supine to/from sit: Independent  2). Sit to/from stand: Independent  3). Bed to chair: Independent  4). Gait: Ambulate 125 ft.   with  SBA and use of rolling walker (RW)  5). Tolerate B LE exercises 3 sets of 10-15 reps  6). Ascend/descend 5 steps with Min A  with use of hand rail bilateral and LRAD (least restrictive assistive device)    Plan   Continue with plan of care. Signature: Fide Benjamin PT, DPT, Shoshana Resendiz #[id number]      If patient discharges from this facility prior to next visit, this note will serve as the Discharge Summary.

## 2021-12-19 NOTE — DISCHARGE INSTR - COC
Continuity of Care Form    Patient Name: Joaquín Aceves   :  1940  MRN:  [id number]    Admit date:  2021  Discharge date:  2021    Code Status Order: Full Code   Advance Directives:   Advance Care Flowsheet Documentation       Date/Time Healthcare Directive Type of Healthcare Directive Copy in [address] Agent's Name Healthcare Agent's Phone Number    [phone] No, patient does not have an advance directive for healthcare treatment -- -- -- -- --            Admitting Physician:  Kenzie Alfonso MD  PCP: AGAPITO Tee - CNP    Discharging Nurse: [facility] Unit/Room#: 0206/0206-01  Discharging Unit Phone Number: [phone]    Emergency Contact:   Extended Emergency Contact Information  Primary Emergency Contact: Lilli Ambrocio  Address: [address] Phone: [phone]  Mobile Phone: [phone]  Relation: Child   needed? No    Past Surgical History:  Past Surgical History:   Procedure Laterality Date    APPENDECTOMY      BACK SURGERY      SHOULDER ARTHROSCOPY Right 2020    RIGHT SHOULDER ARTHROSCOPY WITH DEBRIDEMENT, ROTATOR CUFF REPAIR WITH REGENETEN PATCH, SUBACROMIAL DECOMPRESSION, BICEPS TENODESIS, EXCISION OF MASS  -BLOCK performed by Aishwarya Lozoya MD at [place] [address] Right 2020    debridement, rotator cuff repair, Regeneten patch        Immunization History: There is no immunization history for the selected administration types on file for this patient.     Active Problems:  Patient Active Problem List   Diagnosis Code    Left displaced femoral neck fracture (HCC) S72.002A    Hyperlipidemia E78.5    Benign essential HTN I10    COPD (chronic obstructive pulmonary disease) ([facility] [address] J44.9    AAA (abdominal aortic aneurysm) ([facility]) I71.4    Mild aortic stenosis I35.0    History of CVA (cerebrovascular accident) Z86.73 personal belongings (please select all that are sent with patient):  Glasses, Dentures upper and lower    RN SIGNATURE:  Electronically signed by Katerine Jose RN on 12/21/21 at 3:17 PM EST    CASE MANAGEMENT/SOCIAL WORK SECTION    Inpatient Status Date: ***    Readmission Risk Assessment Score:  Readmission Risk              Risk of Unplanned Readmission:  12           Discharging to Facility/ Agency   Name:   Address:  Phone:  Fax:    Dialysis Facility (if applicable)   Name:  Address:  Dialysis Schedule:  Phone:  Fax:    / signature: {Esignature:[id number]}    PHYSICIAN SECTION    Prognosis: Good    Condition at Discharge: Stable    Rehab Potential (if transferring to Rehab): Good    Recommended Labs or Other Treatments After Discharge:  resume norvasc at night if needed     F/w Dr. Francesco Gomes in 2 weeks    Physician Certification: I certify the above information and transfer of Carlee Ignacio  is necessary for the continuing treatment of the diagnosis listed and that she requires [place] for less 30 days.      Update Admission H&P: No change in H&P    PHYSICIAN SIGNATURE:  Electronically signed by Nicole Chicas MD on 12/19/21 at 10:53 AM EST

## 2021-12-19 NOTE — FLOWSHEET NOTE
12/18/21 1925   Vital Signs   Temp 97.9 °F (36.6 °C)   Temp Source Oral   Pulse 97   Heart Rate Source Monitor   Resp 18   BP (!) 149/73   BP Location Right upper arm   Patient Position Semi fowlers   Level of Consciousness Alert (0)   MEWS Score 1   Oxygen Therapy   SpO2 93 %   O2 Device None (Room air)   Assessment complete- see flowsheets. Pt resting in bed at this time; PRN medication given per pt request within PRN order parameters for c/o surgical pain, see flowsheets for pain assessment. Pt denies further needs at this time, call light within reach, bed alarm in place. Will continue to monitor.   Lillie Camejo RN

## 2021-12-19 NOTE — CONSULTS
[facility] Progress Note      DOS:  12/17/2021   Procedure(s):  LEFT HIP HEMIARTHROPLASTY     Subjective:    Pain controlled. Has been walking to bathroom, about room multiple times. She is anticipating going rehab early this coming week. Good appetite. No CP/SOB. Objective:    Patient Vitals for the past 24 hrs:   BP Temp Temp src Pulse Resp SpO2   12/19/21 0706 -- -- -- -- 16 93 %   12/19/21 0440 136/72 98 °F (36.7 °C) Oral 88 14 93 %   12/19/21 0024 131/69 98.5 °F (36.9 °C) Oral 98 16 92 %   12/18/21 2051 -- -- -- -- 16 94 %   12/18/21 1925 (!) 149/73 97.9 °F (36.6 °C) Oral 97 18 93 %   12/18/21 1512 129/62 97.8 °F (36.6 °C) Oral 90 18 91 %   12/18/21 0915 122/67 98.1 °F (36.7 °C) Oral 84 18 97 %     Resting comfortably in bed, NAD  Normal respiratory effort  Pulse RRR    RLE:    Dressing C/D/I  SILT  Motor intact toe PF/DF      Labs:  CBC:   Lab Results   Component Value Date    WBC 10.2 12/18/2021    RBC 3.69 12/18/2021    HGB 11.1 12/18/2021    HCT 33.0 12/18/2021     12/18/2021     INR:  No results for input(s): PROTIME, INR in the last 72 hours. Imaging Last 24 Hours:  XR PELVIS (1-2 VIEWS)    Result Date: 12/18/2021  EXAMINATION: ONE XRAY VIEW OF THE PELVIS 12/18/2021 8:52 am COMPARISON: 12/17/2021. HISTORY: ORDERING SYSTEM PROVIDED HISTORY: Low AP pelvis s/p L hemiarthroplasty TECHNOLOGIST PROVIDED HISTORY: Reason for exam:->Low AP pelvis s/p L hemiarthroplasty Reason for Exam: post hemiarthroplasty FINDINGS: Stable changes of left hip arthroplasty. Decreased gas in the adjacent soft tissues. The pelvis is otherwise unremarkable with mild osteopenia and no acute fracture. The SI joints and right hip are intact. Aortoiliac and femoral vascular calcification. Postoperative clips in the left groin and proximal thigh. Stable left hip post arthroplasty.      XR FEMUR LEFT (MIN 2 VIEWS)    Result Date: 12/17/2021  EXAMINATION: 4 XRAY VIEWS OF THE LEFT FEMUR 12/17/2021 1:34 pm rehab   Continue DVT prophy x 6 weeks; from ortho perspective, she may transition to Eliquis or Aspirin 325mg BID, or continue LVX   F/u with ortho (Dr. Jos Daniels) in 2 weeks for staple removal   Disposition:  Pending PT recs and discharge Ryan Eagle MD   12/19/2021  8:20 AM  Orthopedic Surgeon  cell:  [phone]

## 2021-12-19 NOTE — PLAN OF CARE
Problem: Falls - Risk of:  Goal: Will remain free from falls  Description: Will remain free from falls  12/18/2021 2339 by Fariba Pedraza RN  Outcome: Ongoing  12/18/2021 1605 by Mohsen Dee RN  Outcome: Ongoing  Goal: Absence of physical injury  Description: Absence of physical injury  12/18/2021 2339 by Fariba Pedraza RN  Outcome: Ongoing  12/18/2021 1605 by Mohsen Dee RN  Outcome: Ongoing     Problem: Skin Integrity:  Goal: Will show no infection signs and symptoms  Description: Will show no infection signs and symptoms  12/18/2021 2339 by Fariba Pedraza RN  Outcome: Ongoing  12/18/2021 1605 by Mohsen Dee RN  Outcome: Ongoing  Goal: Absence of new skin breakdown  Description: Absence of new skin breakdown  12/18/2021 2339 by Fariba Pedraza RN  Outcome: Ongoing  12/18/2021 1605 by Mohsen Dee RN  Outcome: Ongoing     Problem: Pain:  Goal: Pain level will decrease  Description: Pain level will decrease  12/18/2021 2339 by Fariba Pedraza RN  Outcome: Ongoing  12/18/2021 1605 by Mohsen Dee RN  Outcome: Ongoing  Goal: Control of acute pain  Description: Control of acute pain  12/18/2021 2339 by Fariba Pedraza RN  Outcome: Ongoing  12/18/2021 1605 by Mohsen Dee RN  Outcome: Ongoing  Goal: Control of chronic pain  Description: Control of chronic pain  12/18/2021 2339 by Fariba Pedraza RN  Outcome: Ongoing  12/18/2021 1605 by Mohsen Dee RN  Outcome: Ongoing  Goal: Patient's pain/discomfort is manageable  Description: Patient's pain/discomfort is manageable  12/18/2021 2339 by Fariba Pedraza RN  Outcome: Ongoing  12/18/2021 1605 by Mohsen Dee RN  Outcome: Ongoing     Problem: Infection:  Goal: Will remain free from infection  Description: Will remain free from infection  12/18/2021 2339 by Fariba Pedraza RN  Outcome: Ongoing  12/18/2021 1605 by Mohsen Dee RN  Outcome: Ongoing     Problem: Safety:  Goal: Free from accidental physical injury  Description: Free from accidental physical injury  12/18/2021 2339 by Leo Mcmillan RN  Outcome: Ongoing  12/18/2021 1605 by Mallory Sumner RN  Outcome: Ongoing  Goal: Free from intentional harm  Description: Free from intentional harm  12/18/2021 2339 by Leo Mcmillan RN  Outcome: Ongoing  12/18/2021 1605 by Mallory Sumner RN  Outcome: Ongoing     Problem: Daily Care:  Goal: Daily care needs are met  Description: Daily care needs are met  12/18/2021 2339 by Leo Mcmillan RN  Outcome: Ongoing  12/18/2021 1605 by Mallory Sumner RN  Outcome: Ongoing     Problem: Skin Integrity:  Goal: Skin integrity will stabilize  Description: Skin integrity will stabilize  12/18/2021 2339 by Leo Mcmillan RN  Outcome: Ongoing  12/18/2021 1605 by Mallory Sumner RN  Outcome: Ongoing

## 2021-12-19 NOTE — PROGRESS NOTES
12/19/21 0100   RT Protocol   History Pulmonary Disease 1   Respiratory pattern 0   Breath sounds 0   Cough 0   Indications for Bronchodilator Therapy None   Bronchodilator Assessment Score 1   RT Inhaler-Nebulizer Bronchodilator Protocol Note    There is a bronchodilator order in the chart from a provider indicating to follow the RT Bronchodilator Protocol and there is an Initiate RT Inhaler-Nebulizer Bronchodilator Protocol order as well (see protocol at bottom of note). CXR Findings:  XR CHEST PORTABLE    Result Date: 12/17/2021  No acute cardiopulmonary disease. The findings from the last RT Protocol Assessment were as follows:   History Pulmonary Disease: Smoker 15 pack years or more  Respiratory Pattern: Regular pattern and RR 12-20 bpm  Breath Sounds: Clear breath sounds  Cough: Strong, spontaneous, non-productive  Indication for Bronchodilator Therapy: None  Bronchodilator Assessment Score: 1    Aerosolized bronchodilator medication orders have been revised according to the RT Inhaler-Nebulizer Bronchodilator Protocol below. Respiratory Therapist to perform RT Therapy Protocol Assessment initially then follow the protocol. Repeat RT Therapy Protocol Assessment PRN for score 0-3 or on second treatment, BID, and PRN for scores above 3. No Indications - adjust the frequency to every 6 hours PRN wheezing or bronchospasm, if no treatments needed after 48 hours then discontinue using Per Protocol order mode. If indication present, adjust the RT bronchodilator orders based on the Bronchodilator Assessment Score as indicated below. Use Inhaler orders unless patient has one or more of the following: on home nebulizer, not able to hold breath for 10 seconds, is not alert and oriented, cannot activate and use MDI correctly, or respiratory rate 25 breaths per minute or more, then use the equivalent nebulizer order(s) with same Frequency and PRN reasons based on the score.   If a patient is on this medication at home then do not decrease Frequency below that used at home. 0-3 - enter or revise RT bronchodilator order(s) to equivalent RT Bronchodilator order with Frequency of every 4 hours PRN for wheezing or increased work of breathing using Per Protocol order mode. 4-6 - enter or revise RT Bronchodilator order(s) to two equivalent RT bronchodilator orders with one order with BID Frequency and one order with Frequency of every 4 hours PRN wheezing or increased work of breathing using Per Protocol order mode. 7-10 - enter or revise RT Bronchodilator order(s) to two equivalent RT bronchodilator orders with one order with TID Frequency and one order with Frequency of every 4 hours PRN wheezing or increased work of breathing using Per Protocol order mode. 11-13 - enter or revise RT Bronchodilator order(s) to one equivalent RT bronchodilator order with QID Frequency and an Albuterol order with Frequency of every 4 hours PRN wheezing or increased work of breathing using Per Protocol order mode. Greater than 13 - enter or revise RT Bronchodilator order(s) to one equivalent RT bronchodilator order with every 4 hours Frequency and an Albuterol order with Frequency of every 2 hours PRN wheezing or increased work of breathing using Per Protocol order mode.      Electronically signed by Jai Burton RCP on 12/19/2021 at 1:25 AM

## 2021-12-19 NOTE — FLOWSHEET NOTE
12/19/21 0830   Vital Signs   Temp 98.8 °F (37.1 °C)   Temp Source Oral   Pulse 88   Heart Rate Source Monitor   Resp 16   /73   BP Location Right upper arm   Patient Position Semi fowlers   Level of Consciousness Alert (0)   MEWS Score 1   Oxygen Therapy   SpO2 92 %   O2 Device None (Room air)     Pt assessment completed, vss, see flow sheet. PT given all am meds per orders. Pt alert and oriented x 3. Pt medicated for pain with tylenol .   Ravin Wiggins RN

## 2021-12-20 LAB
ANION GAP SERPL CALCULATED.3IONS-SCNC: 8 MMOL/L (ref 3–16)
BASOPHILS ABSOLUTE: 0.1 K/UL (ref 0–0.2)
BASOPHILS RELATIVE PERCENT: 1 %
BUN BLDV-MCNC: 16 MG/DL (ref 7–20)
CALCIUM SERPL-MCNC: 8.5 MG/DL (ref 8.3–10.6)
CHLORIDE BLD-SCNC: 103 MMOL/L (ref 99–110)
CO2: 31 MMOL/L (ref 21–32)
CREAT SERPL-MCNC: 0.6 MG/DL (ref 0.6–1.2)
EOSINOPHILS ABSOLUTE: 0.4 K/UL (ref 0–0.6)
EOSINOPHILS RELATIVE PERCENT: 4.1 %
GFR AFRICAN AMERICAN: >60
GFR NON-AFRICAN AMERICAN: >60
GLUCOSE BLD-MCNC: 132 MG/DL (ref 70–99)
HCT VFR BLD CALC: 33.9 % (ref 36–48)
HEMOGLOBIN: 11.2 G/DL (ref 12–16)
LYMPHOCYTES ABSOLUTE: 1.5 K/UL (ref 1–5.1)
LYMPHOCYTES RELATIVE PERCENT: 15.3 %
MCH RBC QN AUTO: 30.2 PG (ref 26–34)
MCHC RBC AUTO-ENTMCNC: 32.9 G/DL (ref 31–36)
MCV RBC AUTO: 91.8 FL (ref 80–100)
MONOCYTES ABSOLUTE: 0.8 K/UL (ref 0–1.3)
MONOCYTES RELATIVE PERCENT: 7.6 %
NEUTROPHILS ABSOLUTE: 7.2 K/UL (ref 1.7–7.7)
NEUTROPHILS RELATIVE PERCENT: 72 %
PDW BLD-RTO: 14.1 % (ref 12.4–15.4)
PLATELET # BLD: 187 K/UL (ref 135–450)
PMV BLD AUTO: 8.5 FL (ref 5–10.5)
POTASSIUM SERPL-SCNC: 3.2 MMOL/L (ref 3.5–5.1)
RBC # BLD: 3.69 M/UL (ref 4–5.2)
SODIUM BLD-SCNC: 142 MMOL/L (ref 136–145)
WBC # BLD: 10 K/UL (ref 4–11)

## 2021-12-20 PROCEDURE — 97535 SELF CARE MNGMENT TRAINING: CPT

## 2021-12-20 PROCEDURE — 2580000003 HC RX 258: Performed by: ORTHOPAEDIC SURGERY

## 2021-12-20 PROCEDURE — 6370000000 HC RX 637 (ALT 250 FOR IP): Performed by: INTERNAL MEDICINE

## 2021-12-20 PROCEDURE — 6370000000 HC RX 637 (ALT 250 FOR IP): Performed by: ORTHOPAEDIC SURGERY

## 2021-12-20 PROCEDURE — 6360000002 HC RX W HCPCS: Performed by: INTERNAL MEDICINE

## 2021-12-20 PROCEDURE — 97110 THERAPEUTIC EXERCISES: CPT

## 2021-12-20 PROCEDURE — 2580000003 HC RX 258: Performed by: INTERNAL MEDICINE

## 2021-12-20 PROCEDURE — 97116 GAIT TRAINING THERAPY: CPT

## 2021-12-20 PROCEDURE — 99233 SBSQ HOSP IP/OBS HIGH 50: CPT | Performed by: INTERNAL MEDICINE

## 2021-12-20 PROCEDURE — 85025 COMPLETE CBC W/AUTO DIFF WBC: CPT

## 2021-12-20 PROCEDURE — 94640 AIRWAY INHALATION TREATMENT: CPT

## 2021-12-20 PROCEDURE — 80048 BASIC METABOLIC PNL TOTAL CA: CPT

## 2021-12-20 PROCEDURE — 36415 COLL VENOUS BLD VENIPUNCTURE: CPT

## 2021-12-20 PROCEDURE — 1200000000 HC SEMI PRIVATE

## 2021-12-20 RX ADMIN — ASPIRIN 81 MG: 81 TABLET, COATED ORAL at 09:03

## 2021-12-20 RX ADMIN — Medication 10 ML: at 09:09

## 2021-12-20 RX ADMIN — OXYCODONE 2.5 MG: 5 TABLET ORAL at 06:45

## 2021-12-20 RX ADMIN — OXYCODONE 2.5 MG: 5 TABLET ORAL at 16:13

## 2021-12-20 RX ADMIN — METOPROLOL TARTRATE 25 MG: 25 TABLET, FILM COATED ORAL at 09:18

## 2021-12-20 RX ADMIN — ENOXAPARIN SODIUM 40 MG: 100 INJECTION SUBCUTANEOUS at 09:07

## 2021-12-20 RX ADMIN — ATORVASTATIN CALCIUM 80 MG: 40 TABLET, FILM COATED ORAL at 09:04

## 2021-12-20 RX ADMIN — SODIUM CHLORIDE, PRESERVATIVE FREE 10 ML: 5 INJECTION INTRAVENOUS at 09:11

## 2021-12-20 RX ADMIN — Medication 2 PUFF: at 19:32

## 2021-12-20 RX ADMIN — METOPROLOL TARTRATE 25 MG: 25 TABLET, FILM COATED ORAL at 21:34

## 2021-12-20 RX ADMIN — SODIUM CHLORIDE, PRESERVATIVE FREE 10 ML: 5 INJECTION INTRAVENOUS at 21:30

## 2021-12-20 RX ADMIN — EZETIMIBE 10 MG: 10 TABLET ORAL at 21:33

## 2021-12-20 RX ADMIN — Medication 2 PUFF: at 07:20

## 2021-12-20 RX ADMIN — DONEPEZIL HYDROCHLORIDE 5 MG: 5 TABLET, FILM COATED ORAL at 21:34

## 2021-12-20 RX ADMIN — ACETAMINOPHEN 650 MG: 325 TABLET ORAL at 06:45

## 2021-12-20 ASSESSMENT — PAIN DESCRIPTION - DESCRIPTORS
DESCRIPTORS: STABBING
DESCRIPTORS: STABBING

## 2021-12-20 ASSESSMENT — PAIN DESCRIPTION - LOCATION
LOCATION: HIP;LEG
LOCATION: HIP;LEG

## 2021-12-20 ASSESSMENT — PAIN SCALES - GENERAL
PAINLEVEL_OUTOF10: 2
PAINLEVEL_OUTOF10: 8
PAINLEVEL_OUTOF10: 5
PAINLEVEL_OUTOF10: 6
PAINLEVEL_OUTOF10: 7

## 2021-12-20 ASSESSMENT — PAIN DESCRIPTION - PAIN TYPE
TYPE: ACUTE PAIN
TYPE: ACUTE PAIN

## 2021-12-20 ASSESSMENT — PAIN DESCRIPTION - FREQUENCY: FREQUENCY: INTERMITTENT

## 2021-12-20 ASSESSMENT — PAIN DESCRIPTION - ORIENTATION: ORIENTATION: LEFT

## 2021-12-20 NOTE — PROGRESS NOTES
Inpatient Physical Therapy Daily Treatment Note    Unit: 2 [place]  Date:  12/20/2021  Patient Name:    Wily Neal  Admitting diagnosis:  Left hip pain [M25.552]  Closed fracture of neck of left femur with malunion [S72.002P]  Left displaced femoral neck fracture ([facility] [address] [S72.002A]  Injury of left ankle, initial encounter [S99.912A]  Fall from slip, trip, or stumble, initial encounter [W01. 0XXA]  Elbow injury, left, initial encounter [S53.902A]  Admit Date:  12/17/2021  Precautions/Restrictions:  Fall risk, Lines -IV and WB Restrictions (WBAT L LE)      Discharge Recommendations: Home 24 hr assist and with home PT (Patient wants to go to SNF since her family is unsure about taking care of her at home with 24hr assist)  DME needs for discharge: Needs Met       Therapy recommendation for EMS Transport: can transport by wheelchair    Therapy recommendations for staff:   Stand by assist with use of rolling walker (RW) and gait belt for all transfers and ambulation to/from [facility]  to/from chair  to/from bathroom  within room  within halls    History of Present Illness:  ED note, 12/17/2021, [place] [place]:   \" 81 y.o. female  has a past medical history of CVA (cerebral vascular accident) ([facility] [address] Hyperlipidemia, Hypertension, and Lyme disease. who presents to the ED for fall.  Patient states she was packing and she attempted to slap step around a car table and she tripped over the leg of the table.  Landed on the left side.  Elbow and her left ankle and immediate severe pain to her left hip and difficulty with extending her left leg.  She did not hit her head, she is not anticoagulated, did not lose consciousness. \"      12/17/2021      Left hemiarthroplasty     Home Health S4 Level Recommendation: Level 3 Safety  AM-PAC Mobility Score   AM-PAC Inpatient Mobility Raw Score : 22       Treatment Time:  1059 - 1125  Treatment number: 3  Timed Code Treatment Minutes: 26 minutes  Total Treatment Minutes: 26 minutes    Cognition    A&O x4 Able to follow 2 step commands    Subjective  Patient lying supine in bed with no family present   Pt agreeable to this PT tx. Pain   Yes  Location: L lateral thigh  Ratin/10  Pain Medicine Status: Pain med requested     Bed Mobility   Supine to Sit:    Not Tested  Sit to Supine:   Not Tested  Rolling:   Not Tested  Scooting:   Modified Independent    Transfer Training     Sit to stand:   Supervision  Stand to sit:   Supervision  Bed to Chair:   SBA with use of gait belt and rolling walker (RW)    Gait Training gait completed as indicated below  Distance:      170 ft  Deviations (firm surface/linoleum):  step to pattern, antalgic pattern and decreased step length on left  Assistive Device Used:    gait belt and rolling walker (RW)  Level of Assist:    SBA  Comment: Patient needed intermittent verbal cueing for sequencing for step through pattern. Patient tolerated the ambulation well without LOB. Stair Training stairs completed as indicated below  # of Steps:   1 x 5 reps  Level of Assist:  CGA  UE Support:  NA  Assistive Device:  RW  Pattern:   non-reciprocal pattern  Comments: Pt able to navigate up/down stairs without increase in L hip pain and with good stability. Therapeutic Exercise all completed bilaterally unless indicated  Ankle Pumps x 20 reps  LAQ x 20 reps    Balance  Sitting:  Normal; Independent  Comments:     Standing: Good ; Supervision  Comments:     Patient Education      Role of PT, POC, Discharge recommendations, DC recommendations, safety awareness, pacing activity and calling for assist with mobility. Positioning Needs       Pt up in chair, no alarm needed, positioned in proper neutral alignment and pressure relief provided. Call light provided and all needs within reach    ROM Measurements N/A  Knee Flexion:  Knee Extension:     Activity Tolerance   Pt completed therapy session with Pain noted with ambulation and standing activities.   At rest, SpO2: 97% on RA, HR: 68 bpm  After ambulation and stair ambulation, SpO2 98% on RA, HR: 69 bpm    Other  N/A    Assessment :  Patient tolerated the session well today with good standing and ambulation tolerance. Patient wanted to go to SNF since her family is not confident in providing 24hr assist at home. Patient will benefit from continuation from PT skilled services to achieve independence in functional mobility. Recommending Home 24 hr assist and with home PT upon discharge as patient functioning close to baseline level and would benefit from continued therapy services    Goals (all goals ongoing unless otherwise indicated)  To be met in 3 visits:  1). Independent with LE Ex x 10 reps  2.) Bed to chair: SBA     To be met in 6 visits:  1). Supine to/from sit: Independent  2). Sit to/from stand: Independent  3). Bed to chair: Independent  4). Gait: Ambulate 125 ft.   with  SBA and use of rolling walker (RW)  5). Tolerate B LE exercises 3 sets of 10-15 reps  6). Ascend/descend 5 steps with Min A  with use of hand rail bilateral and LRAD (least restrictive assistive device)    Plan   Continue with plan of care. Signature: Jhony Mcnair, MS PT, # [id number]    If patient discharges from this facility prior to next visit, this note will serve as the Discharge Summary.

## 2021-12-20 NOTE — FLOWSHEET NOTE
12/19/21 2033   Vital Signs   Temp 99.3 °F (37.4 °C)   Temp Source Oral   Pulse 80   Heart Rate Source Monitor   Resp 18   BP (!) 155/70   BP Location Right upper arm   Patient Position Semi fowlers   Level of Consciousness Alert (0)   MEWS Score 1   Oxygen Therapy   SpO2 96 %   O2 Device None (Room air)   Assessment complete- see flowsheets. Pt resting in bed at this time, PRN medications given per pt request within PRN order parameters- see eMAR for medication administration information. Call light within reach, further needs denied at this time by pt, bed alarm in place. Will continue to monitor.   Austen Loco RN

## 2021-12-20 NOTE — PLAN OF CARE
Problem: Falls - Risk of:  Goal: Will remain free from falls  Description: Will remain free from falls  12/19/2021 2025 by Mary Gong RN  Outcome: Ongoing  12/19/2021 1535 by Rolando Michel RN  Outcome: Ongoing  Goal: Absence of physical injury  Description: Absence of physical injury  12/19/2021 2025 by Mary Gong RN  Outcome: Ongoing  12/19/2021 1535 by Rolando Michel RN  Outcome: Ongoing     Problem: Skin Integrity:  Goal: Will show no infection signs and symptoms  Description: Will show no infection signs and symptoms  12/19/2021 2025 by Mary Gong RN  Outcome: Ongoing  12/19/2021 1535 by Rolando Michel RN  Outcome: Ongoing  Goal: Absence of new skin breakdown  Description: Absence of new skin breakdown  12/19/2021 2025 by Mary Gong RN  Outcome: Ongoing  12/19/2021 1535 by Rolando Michel RN  Outcome: Ongoing     Problem: Pain:  Goal: Pain level will decrease  Description: Pain level will decrease  12/19/2021 2025 by Mary Gong RN  Outcome: Ongoing  12/19/2021 1535 by Rolando Michel RN  Outcome: Ongoing  Goal: Control of acute pain  Description: Control of acute pain  12/19/2021 2025 by Mary Gong RN  Outcome: Ongoing  12/19/2021 1535 by Rolando Michel RN  Outcome: Ongoing  Goal: Control of chronic pain  Description: Control of chronic pain  12/19/2021 2025 by Mary Gong RN  Outcome: Ongoing  12/19/2021 1535 by Rolando Michel RN  Outcome: Ongoing  Goal: Patient's pain/discomfort is manageable  Description: Patient's pain/discomfort is manageable  12/19/2021 2025 by Mary Gong RN  Outcome: Ongoing  12/19/2021 1535 by Rolando Michel RN  Outcome: Ongoing     Problem: Infection:  Goal: Will remain free from infection  Description: Will remain free from infection  12/19/2021 2025 by Mary Gong RN  Outcome: Ongoing  12/19/2021 1535 by Rolando Michel RN  Outcome: Ongoing     Problem: Safety:  Goal: Free from accidental physical injury  Description: Free from accidental

## 2021-12-20 NOTE — FLOWSHEET NOTE
12/20/21 0845   Vital Signs   Temp 98.5 °F (36.9 °C)   Temp Source Oral   Pulse 77   Heart Rate Source Monitor   Resp 16   BP (!) 127/55   BP Location Right upper arm   Patient Position Semi fowlers   Level of Consciousness Alert (0)   MEWS Score 1   Patient Currently in Pain Yes   Pain Assessment   Pain Assessment 0-10   Pain Level 5   Pain Type Acute pain   Pain Location Hip;Leg   Pain Descriptors Stabbing   Pain Frequency Intermittent   Oxygen Therapy   SpO2 92 %   Pulse Oximeter Device Mode Intermittent   Pulse Oximeter Device Location Left;Finger   O2 Device Non-rebreather mask     Patient A+Ox 4, vitals and assessment done at this time.

## 2021-12-20 NOTE — PROGRESS NOTES
Bedside Mobility Assessment Tool (BMAT):     Assessment Level 1- Sit and Shake    1. From a semi-reclined position, ask patient to sit up and rotate to a seated position at the side of the bed. Can use the bedrail. 2. Ask patient to reach out and grab your hand and shake making sure patient reaches across his/her midline. Pass- Patient is able to come to a seated position, maintain core strength. Maintains seated balance while reaching across midline. Move on to Assessment Level 2. Assessment Level 2- Stretch and Point   1. With patient in seated position at the side of the bed, have patient place both feet on the floor (or stool) with knees no higher than hips. 2. Ask patient to stretch one leg and straighten the knee, then bend the ankle/flex and point the toes. If appropriate, repeat with the other leg. Pass- Patient is able to demonstrate appropriate quad strength on intended weight bearing limb(s). Move onto Assessment Level 3. Assessment Level 3- Stand   1. Ask patient to elevate off the bed or chair (seated to standing) using an assistive device (cane, bedrail). 2. Patient should be able to raise buttocks off be and hold for a count of five. May repeat once. Fail- Patient unable to demonstrate standing stability. Patient is MOBILITY LEVEL 3. Assessment Level 4- Walk   1. Ask patient to march in place at bedside. 2. Then ask patient to advance step and return each foot. Some medical conditions may render a patient from stepping backwards, use your best clinical judgement. Pass- Patient demonstrates balance while shifting weight and ability to step, takes independent steps, does not use assistive device patient is MOBILITY LEVEL 4.       Mobility Level- 4

## 2021-12-20 NOTE — PLAN OF CARE
Problem: Falls - Risk of:  Goal: Will remain free from falls  Description: Will remain free from falls  12/20/2021 1405 by Christie Craig RN  Outcome: Ongoing  12/20/2021 1404 by Christie Craig RN  Outcome: Ongoing  Goal: Absence of physical injury  Description: Absence of physical injury  12/20/2021 1405 by Christie Craig RN  Outcome: Ongoing  12/20/2021 1404 by Christie Craig RN  Outcome: Ongoing     Problem: Skin Integrity:  Goal: Will show no infection signs and symptoms  Description: Will show no infection signs and symptoms  12/20/2021 1405 by Christie Craig RN  Outcome: Ongoing  12/20/2021 1404 by Christie Craig RN  Outcome: Ongoing  Goal: Absence of new skin breakdown  Description: Absence of new skin breakdown  12/20/2021 1405 by Christie Craig RN  Outcome: Ongoing  12/20/2021 1404 by Christie Craig RN  Outcome: Ongoing     Problem: Pain:  Goal: Pain level will decrease  Description: Pain level will decrease  12/20/2021 1405 by Christie Craig RN  Outcome: Ongoing  12/20/2021 1404 by Christie Craig RN  Outcome: Ongoing  Goal: Control of acute pain  Description: Control of acute pain  12/20/2021 1405 by Christie Craig RN  Outcome: Ongoing  12/20/2021 1404 by Christie Craig RN  Outcome: Ongoing  Goal: Control of chronic pain  Description: Control of chronic pain  12/20/2021 1405 by Christie Craig RN  Outcome: Ongoing  12/20/2021 1404 by Christie Craig RN  Outcome: Ongoing  Goal: Patient's pain/discomfort is manageable  Description: Patient's pain/discomfort is manageable  12/20/2021 1405 by Christie Craig RN  Outcome: Ongoing  12/20/2021 1404 by Christie Craig RN  Outcome: Ongoing     Problem: Infection:  Goal: Will remain free from infection  Description: Will remain free from infection  12/20/2021 1405 by Christie Craig RN  Outcome: Ongoing  12/20/2021 1404 by Christie Craig RN  Outcome: Ongoing     Problem: Safety:  Goal: Free from accidental physical injury  Description: Free from accidental physical injury  12/20/2021 1405 by Familia Richard RN  Outcome: Ongoing  12/20/2021 1404 by Familia Richard RN  Outcome: Ongoing  Goal: Free from intentional harm  Description: Free from intentional harm  12/20/2021 1405 by Familia Richard RN  Outcome: Ongoing  12/20/2021 1404 by Familia Richard RN  Outcome: Ongoing     Problem: Daily Care:  Goal: Daily care needs are met  Description: Daily care needs are met  12/20/2021 1405 by Familia Richard RN  Outcome: Ongoing  12/20/2021 1404 by Familia Richard RN  Outcome: Ongoing     Problem: Skin Integrity:  Goal: Skin integrity will stabilize  Description: Skin integrity will stabilize  12/20/2021 1405 by Familia Richard RN  Outcome: Ongoing  12/20/2021 1404 by Familia Richard RN  Outcome: Ongoing     Problem: Discharge Planning:  Goal: Patients continuum of care needs are met  Description: Patients continuum of care needs are met  12/20/2021 1405 by Familia Richard RN  Outcome: Ongoing  12/20/2021 1404 by Familia Richard RN  Outcome: Ongoing

## 2021-12-20 NOTE — PROGRESS NOTES
Occupational Therapy Daily Treatment Note    Unit: 2 [place]  Date:  2021  Patient Name:    Willie Oliva  Admitting diagnosis:  Left hip pain [M25.552]  Closed fracture of neck of left femur with malunion [S72.002P]  Left displaced femoral neck fracture ([facility] [address] [S72.002A]  Injury of left ankle, initial encounter [S99.912A]  Fall from slip, trip, or stumble, initial encounter [W01. 0XXA]  Elbow injury, left, initial encounter [S59.902A]  Admit Date:  2021  Precautions/Restrictions:  Fall risk, Bed/chair alarm, and WB Restrictions (FWB LLE)        Discharge Recommendations: Home 24 hr supervision and intermittent assist  and Home OT  DME needs for discharge: Needs Met       Therapy recommendations for staff:   Stand by assist with use of rolling walker (RW) for all transfers and ambulation    to/from bathroom  within halls    AM-PAC Score: AM-PAC Inpatient Daily Activity Raw Score: 19  Home Health S4 Level: Level 1- Standard       Treatment Time:    Treatment number:  2   Timed code treatment minutes: 34 minutes  Total treatment minutes:   34 minutes    History of Present Illness:  ED note, 2021, [place]:   \" [de-identified] y.o. female  has a past medical history of CVA (cerebral vascular accident) ([facility] [address] Hyperlipidemia, Hypertension, and Lyme disease. who presents to the ED for fall.  Patient states she was packing and she attempted to slap step around a car table and she tripped over the leg of the table.  Landed on the left side.  Elbow and her left ankle and immediate severe pain to her left hip and difficulty with extending her left leg.  She did not hit her head, she is not anticoagulated, did not lose consciousness. \"      2021      Left hemiarthroplasty        Subjective:  Pt in room in bed. Agreeable to OT.      Pain   Yes  Ratin  Location:LLE  Pain Medicine Status: Received pain med prior to tx      Bed Mobility:   Supine to Sit:  SBA and HOB elevated  Sit to Supine:  Not Tested  Rolling:           Not Tested  Scooting:        Supervision    Transfer Training:   Sit to stand:   Supervision  Stand to sit:  Supervision  Bed to Chair:  Supervision  Bed to [facility]:   Not Tested  Standard toilet:   Not Tested    Activity Tolerance   Pt completed therapy session with No adverse symptoms noted w/activity    ADL Training:   Upper body dressing: Not Tested  Upper body bathing:  Not Tested  Lower body dressing:  Not Tested  Lower body bathing:  Not Tested  Toileting:   SBA  Grooming/Hygiene:  Supervision  - in stance at sink. Standing 4 min. Therapeutic Exercise: Henry deferred secondary to ADLs  N/A    Patient Education:   Role of OT  Safe RW use/hand placement    Positioning Needs:   Pt up in chair, no alarm needed, positioned in proper neutral alignment and pressure relief provided. Call light provided and all needs within reach    Family Present:  No    Assessment: Pt demo in room functional mobility and grooming tasks with supervision. Pt demo good safety with mobility related tasks. Cont per OT poc. Goal(s): To be met in 3 Visits:  1). Bed to toilet: supervision - MET 12/20/2021     To be met in 5 Visits:  1). Supine to/from Sit:             Independent (has hospital bed at home)   2). Upper Body Bathing:         minimal assistance (25%)  3). Lower Body Bathing:         moderate assistance (50%)  4). Upper Body Dressing:       minimal assistance (25%)  5). Lower Body Dressing:       moderate assistance (50%)  6). Pt to demonstrate UE exs x 15 reps with minimal cues         Plan: To be seen 3-5 x/wk while in acute care setting for strengthening, bed mobility, transfer training, family/patient education and ADL/IADL retraining.       Ivette Booker OTR/L WS#[id number]        If patient discharges from this facility prior to next visit, this note will serve as the Discharge Summary

## 2021-12-21 VITALS
OXYGEN SATURATION: 95 % | HEIGHT: 61 IN | HEART RATE: 80 BPM | DIASTOLIC BLOOD PRESSURE: 69 MMHG | TEMPERATURE: 98.6 F | SYSTOLIC BLOOD PRESSURE: 122 MMHG | WEIGHT: 117 LBS | RESPIRATION RATE: 16 BRPM | BODY MASS INDEX: 22.09 KG/M2

## 2021-12-21 LAB — SARS-COV-2, NAAT: NOT DETECTED

## 2021-12-21 PROCEDURE — 2580000003 HC RX 258: Performed by: ORTHOPAEDIC SURGERY

## 2021-12-21 PROCEDURE — 6370000000 HC RX 637 (ALT 250 FOR IP): Performed by: INTERNAL MEDICINE

## 2021-12-21 PROCEDURE — 94640 AIRWAY INHALATION TREATMENT: CPT

## 2021-12-21 PROCEDURE — 99239 HOSP IP/OBS DSCHRG MGMT >30: CPT | Performed by: INTERNAL MEDICINE

## 2021-12-21 PROCEDURE — 87635 SARS-COV-2 COVID-19 AMP PRB: CPT

## 2021-12-21 PROCEDURE — 94761 N-INVAS EAR/PLS OXIMETRY MLT: CPT

## 2021-12-21 PROCEDURE — 6360000002 HC RX W HCPCS: Performed by: INTERNAL MEDICINE

## 2021-12-21 RX ADMIN — ASPIRIN 81 MG: 81 TABLET, COATED ORAL at 09:05

## 2021-12-21 RX ADMIN — Medication 2 PUFF: at 07:24

## 2021-12-21 RX ADMIN — ENOXAPARIN SODIUM 40 MG: 100 INJECTION SUBCUTANEOUS at 09:06

## 2021-12-21 RX ADMIN — ATORVASTATIN CALCIUM 80 MG: 40 TABLET, FILM COATED ORAL at 09:05

## 2021-12-21 RX ADMIN — METOPROLOL TARTRATE 25 MG: 25 TABLET, FILM COATED ORAL at 09:06

## 2021-12-21 RX ADMIN — OXYCODONE 2.5 MG: 5 TABLET ORAL at 01:29

## 2021-12-21 RX ADMIN — SODIUM CHLORIDE, PRESERVATIVE FREE 10 ML: 5 INJECTION INTRAVENOUS at 09:06

## 2021-12-21 ASSESSMENT — PAIN SCALES - GENERAL
PAINLEVEL_OUTOF10: 4
PAINLEVEL_OUTOF10: 0

## 2021-12-21 NOTE — PLAN OF CARE
Problem: Falls - Risk of:  Goal: Will remain free from falls  Description: Will remain free from falls  12/21/2021 0210 by Mirlande Giles RN  Outcome: Ongoing  12/20/2021 1405 by Kanchan Lemon RN  Outcome: Ongoing  12/20/2021 1404 by Kanchan Lemon RN  Outcome: Ongoing  Goal: Absence of physical injury  Description: Absence of physical injury  12/21/2021 0210 by Mirlande Giles RN  Outcome: Ongoing  12/20/2021 1405 by Kanchan Lemon RN  Outcome: Ongoing  12/20/2021 1404 by Kanchan Lemon RN  Outcome: Ongoing     Problem: Skin Integrity:  Goal: Will show no infection signs and symptoms  Description: Will show no infection signs and symptoms  12/21/2021 0210 by Mirlande Giles RN  Outcome: Ongoing  12/20/2021 1405 by Kanchan Lemon RN  Outcome: Ongoing  12/20/2021 1404 by Kanchan Lemon RN  Outcome: Ongoing  Goal: Absence of new skin breakdown  Description: Absence of new skin breakdown  12/21/2021 0210 by Mirlande Giles RN  Outcome: Ongoing  12/20/2021 1405 by Kanchan Lemon RN  Outcome: Ongoing  12/20/2021 1404 by Kanchan Lemon RN  Outcome: Ongoing     Problem: Pain:  Description: Pain management should include both nonpharmacologic and pharmacologic interventions.   Goal: Pain level will decrease  Description: Pain level will decrease  12/21/2021 0210 by Mirlande Giles RN  Outcome: Ongoing  12/20/2021 1405 by Kanchan Lemon RN  Outcome: Ongoing  12/20/2021 1404 by Kanchan Lemon RN  Outcome: Ongoing  Goal: Control of acute pain  Description: Control of acute pain  12/21/2021 0210 by Mirlande Giles RN  Outcome: Ongoing  12/20/2021 1405 by Kanchan Lemon RN  Outcome: Ongoing  12/20/2021 1404 by Kanchan Lemon RN  Outcome: Ongoing  Goal: Control of chronic pain  Description: Control of chronic pain  12/21/2021 0210 by Mirlande Giles RN  Outcome: Ongoing  12/20/2021 1405 by Albany Mulligan, RN  Outcome: Ongoing  12/20/2021 1404 by Kanchan Lemon, RN  Outcome: Ongoing  Goal: Patient's pain/discomfort is manageable  Description: Patient's pain/discomfort is manageable  12/21/2021 0210 by Neelima Muñoz RN  Outcome: Ongoing  12/20/2021 1405 by Garry Duke RN  Outcome: Ongoing  12/20/2021 1404 by Garry Duke RN  Outcome: Ongoing     Problem: Infection:  Goal: Will remain free from infection  Description: Will remain free from infection  12/21/2021 0210 by Neelima Muñoz RN  Outcome: Ongoing  12/20/2021 1405 by Garry Duke RN  Outcome: Ongoing  12/20/2021 1404 by Garry Duke RN  Outcome: Ongoing     Problem: Safety:  Goal: Free from accidental physical injury  Description: Free from accidental physical injury  12/21/2021 0210 by Neelima Muñoz RN  Outcome: Ongoing  12/20/2021 1405 by Garry Duke RN  Outcome: Ongoing  12/20/2021 1404 by Garry Duke RN  Outcome: Ongoing  Goal: Free from intentional harm  Description: Free from intentional harm  12/21/2021 0210 by Neelima Muñoz RN  Outcome: Ongoing  12/20/2021 1405 by Garry Duke RN  Outcome: Ongoing  12/20/2021 1404 by Garry Duke RN  Outcome: Ongoing     Problem: Daily Care:  Goal: Daily care needs are met  Description: Daily care needs are met  12/21/2021 0210 by Neelima Muñoz RN  Outcome: Ongoing  12/20/2021 1405 by Garry Duke RN  Outcome: Ongoing  12/20/2021 1404 by Garry Duke RN  Outcome: Ongoing     Problem: Skin Integrity:  Goal: Skin integrity will stabilize  Description: Skin integrity will stabilize  12/21/2021 0210 by Neelima Muñoz RN  Outcome: Ongoing  12/20/2021 1405 by Garry Duke RN  Outcome: Ongoing  12/20/2021 1404 by Garry Duke RN  Outcome: Ongoing     Problem: Discharge Planning:  Goal: Patients continuum of care needs are met  Description: Patients continuum of care needs are met  12/21/2021 0210 by Neelima Muñoz RN  Outcome: Ongoing  12/20/2021 1405 by Garry Duke RN  Outcome: Ongoing  12/20/2021 1404 by Garry Duke RN  Outcome: Ongoing

## 2021-12-21 NOTE — CARE COORDINATION
DISCHARGE ORDER  Date/Time 2021 2:51 PM  Completed by: Mike Rocha RN, Case Management    Patient Name: Robert Armas    : 1940      Admit order Date and Status: IP 2021  Noted discharge order. (verify MD's last order for status of admission/Traditional Medicare 3 [place] Inpatient qualifying stay required for SNF)    Confirmed discharge plan with:              Patient:  Romina Angela (daughter)                                   Discharge to Facility:   · Name: [facility]  · [address], [place], [address]  · Phone: [phone]  · Fax: [facility] phone number for staff giving report: see above  Pre-certification completed: [facility] Exemption Notification (HENS) completed: BRIAN  Discharge orders and Continuity of Care faxed to facility:  YES     Transportation:               Medical Transport explained with choice list offered to pt/family. Choice: (no preference)  Agency used: [facility]   time:  17:30-18:00 PM  Pt/family/Nursing/Facility aware of  time:   [facility] ([address])  [address] [place] [address] ()    Ambulance form completed:  YES       Discharging nurse to complete TONEY, reconcile AVS, and place final copy with patient's discharge packet. Discharging RN to ensure that written prescriptions for  Level II medications are sent with patient to the facility as per protocol.

## 2021-12-21 NOTE — PROGRESS NOTES
Pt requesting pain medication for surgical hip pain rating 4/10. PRN  pain medication given, see MAR. SR up x2, Call light and bedside table in easy reach. Denies any other needs at this time.

## 2021-12-21 NOTE — CARE COORDINATION
Pt medically ready for DC to SNF today. CM spoke with Jannette Malave in admissions at [facility] states that precert is still pending. Unclear if it will return today vs tomorrow. She will call with updates when they are available. CM to follow and assist as able.        Jillian Carrasquillo RN

## 2021-12-21 NOTE — PROGRESS NOTES
Hospitalist Progress Note      PCP: AGAPITO Vivas CNP    Date of Admission: 12/17/2021    Subjective: pain controlled    Medications:  Reviewed    Infusion Medications    sodium chloride      sodium chloride Stopped (12/18/21 2014)    sodium chloride       Scheduled Medications    atorvastatin  80 mg Oral Daily    metoprolol tartrate  25 mg Oral BID    donepezil  5 mg Oral Nightly    ezetimibe  10 mg Oral Nightly    budesonide-formoterol  2 puff Inhalation BID    enoxaparin  40 mg SubCUTAneous Daily    aspirin  81 mg Oral Daily    sodium chloride flush  5-40 mL IntraVENous 2 times per day    sodium chloride flush  10 mL IntraVENous 2 times per day     PRN Meds: albuterol sulfate HFA, oxyCODONE **OR** [DISCONTINUED] oxyCODONE, sodium chloride flush, sodium chloride, polyethylene glycol, acetaminophen **OR** acetaminophen, traMADol, prochlorperazine, sodium chloride flush, sodium chloride, acetaminophen, promethazine **OR** ondansetron      Intake/Output Summary (Last 24 hours) at 12/21/2021 1056  Last data filed at 12/20/2021 1829  Gross per 24 hour   Intake 480 ml   Output --   Net 480 ml       Physical Exam Performed:    BP (!) 154/78   Pulse 85   Temp 97.8 °F (36.6 °C) (Oral)   Resp 16   Ht 5' 1\" (1.549 m)   Wt 117 lb (53.1 kg)   SpO2 97%   BMI 22.11 kg/m²       General:  Elderly female healthy appearing Awake, alert and oriented. Appears to be not in any distress  Mucous Membranes:  Pink , anicteric  Neck: No JVD, no carotid bruit, no thyromegaly  Chest:  Clear to auscultation bilaterally, no added sounds  Cardiovascular:  RRR S1S2 heard, no murmurs or gallops  Abdomen:  Soft, undistended, non tender, no organomegaly, BS present  Extremities: left hip dry dressing dry  No edema or cyanosis.  Distal pulses well felt  Neurological : grossly normal       Labs:   Recent Labs     12/20/21  1052   WBC 10.0   HGB 11.2*   HCT 33.9*        Recent Labs     12/20/21  1052      K 3.2*      CO2 31   BUN 16   CREATININE 0.6   CALCIUM 8.5     No results for input(s): AST, ALT, BILIDIR, BILITOT, ALKPHOS in the last 72 hours. No results for input(s): INR in the last 72 hours. No results for input(s): [place] in the last 72 hours. Urinalysis:      Lab Results   Component Value Date    NITRU Negative 06/23/2020    WBCUA 6-9 06/23/2020    BACTERIA Rare 06/23/2020    RBCUA 3-4 06/23/2020    BLOODU SMALL 06/23/2020    SPECGRAV 1.015 06/23/2020    GLUCOSEU Negative 06/23/2020       Radiology:  XR PELVIS (1-2 VIEWS)   Final Result   Stable left hip post arthroplasty. XR HIP 2-3 VW W PELVIS LEFT   Final Result   No unexpected findings following left hip arthroplasty. XR FEMUR LEFT (MIN 2 VIEWS)   Final Result   Redemonstration of displaced left femoral neck fracture. No other acute   osseous abnormality of the left femur. XR CHEST PORTABLE   Final Result   No acute cardiopulmonary disease. XR ELBOW LEFT (2 VIEWS)   Final Result   No acute osseous abnormality identified in the elbow. XR HIP 2-3 VW W PELVIS LEFT   Final Result   Left femoral neck fracture with proximal migration of the femoral shaft   relative to the hip joint. XR ANKLE LEFT (2 VIEWS)   Final Result   Osteopenia, with no acute osseous fracture.                  Assessment/Plan:    Active Hospital Problems    Diagnosis     Left hip pain [M25.552]     Left displaced femoral neck fracture ([facility] [S72.002A]     COPD (chronic obstructive pulmonary disease) ([facility] [J44.9]     AAA (abdominal aortic aneurysm) ([facility] [I71.4]     Mild aortic stenosis [I35.0]     History of CVA (cerebrovascular accident) [Z86.73]     Benign essential HTN [I10]        #Left femoral neck fracture  -Mechanical fall at home   -POD 2 sp left bessy hip arthroplasty   - pain well controlled  - avoid excessive pain meds  - start IS , start PT  - dvt prophylaxis  - dc to rehab        #HTN  -BP stable  -

## 2021-12-21 NOTE — PROGRESS NOTES
Handoff report and transfer of care given at bedside to [facility] and [address]. Patient in stable condition, denies needs/concerns at this time. Call light within reach.

## 2021-12-21 NOTE — PROGRESS NOTES
Shift assessment complete; see flow sheet. Scheduled medications administered; See MAR. IV flushed without difficulty. Pt deneis pain  at this time. Pt denies any needs at this time.  Pt educated on use of call light and to call out with needs, verbalized understanding, bed in low locked position for pt safety

## 2021-12-21 NOTE — PLAN OF CARE
Problem: Falls - Risk of:  Goal: Will remain free from falls  Description: Will remain free from falls  12/21/2021 0916 by Jourdan Jones RN  Outcome: Ongoing  12/21/2021 0210 by Emily Gilbert RN  Outcome: Ongoing  Goal: Absence of physical injury  Description: Absence of physical injury  12/21/2021 0916 by Jourdan Jones RN  Outcome: Ongoing  12/21/2021 0210 by Emily Gilbert RN  Outcome: Ongoing     Problem: Skin Integrity:  Goal: Will show no infection signs and symptoms  Description: Will show no infection signs and symptoms  12/21/2021 0916 by Jourdan Jones RN  Outcome: Ongoing  12/21/2021 0210 by Emily Gilbert RN  Outcome: Ongoing  Goal: Absence of new skin breakdown  Description: Absence of new skin breakdown  12/21/2021 0916 by Jourdan Jones RN  Outcome: Ongoing  12/21/2021 0210 by Emily Gilbert RN  Outcome: Ongoing     Problem: Pain:  Goal: Pain level will decrease  Description: Pain level will decrease  12/21/2021 0916 by Jourdan Jones RN  Outcome: Ongoing  12/21/2021 0210 by Emily Gilbert RN  Outcome: Ongoing  Goal: Control of acute pain  Description: Control of acute pain  12/21/2021 0916 by Jourdan Jones RN  Outcome: Ongoing  12/21/2021 0210 by Emily Gilbert RN  Outcome: Ongoing  Goal: Control of chronic pain  Description: Control of chronic pain  12/21/2021 0916 by Jourdan Jones RN  Outcome: Ongoing  12/21/2021 0210 by Emily Gilbert RN  Outcome: Ongoing  Goal: Patient's pain/discomfort is manageable  Description: Patient's pain/discomfort is manageable  12/21/2021 0916 by Jourdan Jones RN  Outcome: Ongoing  12/21/2021 0210 by Emily Gilbert RN  Outcome: Ongoing     Problem: Infection:  Goal: Will remain free from infection  Description: Will remain free from infection  12/21/2021 0916 by Jourdan Jones RN  Outcome: Ongoing  12/21/2021 0210 by Emily Gilbert RN  Outcome: Ongoing     Problem: Safety:  Goal: Free from accidental physical injury  Description: Free from accidental physical injury  12/21/2021 0916 by Norma Perry RN  Outcome: Ongoing  12/21/2021 0210 by Jayden Argueta RN  Outcome: Ongoing  Goal: Free from intentional harm  Description: Free from intentional harm  12/21/2021 0916 by Norma Perry RN  Outcome: Ongoing  12/21/2021 0210 by Jayden Argueta RN  Outcome: Ongoing     Problem: Daily Care:  Goal: Daily care needs are met  Description: Daily care needs are met  12/21/2021 0916 by Norma Perry RN  Outcome: Ongoing  12/21/2021 0210 by Jayden Argueta RN  Outcome: Ongoing     Problem: Skin Integrity:  Goal: Skin integrity will stabilize  Description: Skin integrity will stabilize  12/21/2021 0916 by Norma Perry RN  Outcome: Ongoing  12/21/2021 0210 by Jayden Argueta RN  Outcome: Ongoing     Problem: Discharge Planning:  Goal: Patients continuum of care needs are met  Description: Patients continuum of care needs are met  12/21/2021 0916 by Norma Perry RN  Outcome: Ongoing  12/21/2021 0210 by Jayden Argueta RN  Outcome: Ongoing

## 2021-12-28 ENCOUNTER — TELEPHONE (OUTPATIENT)
Dept: ORTHOPEDIC SURGERY | Age: 81
End: 2021-12-28

## 2021-12-28 NOTE — TELEPHONE ENCOUNTER
Medical Facility Question     [facility]: [facility]  Contact Number: [phone]  Request or Information: PATIENT NEEDS A POST-OP APPOINTMENT IN THE [facility] OFFICE. CANNOT DO Wednesday.

## 2022-01-04 ENCOUNTER — OFFICE VISIT (OUTPATIENT)
Dept: ORTHOPEDIC SURGERY | Age: 82
End: 2022-01-04

## 2022-01-04 DIAGNOSIS — S72.002A LEFT DISPLACED FEMORAL NECK FRACTURE (HCC): Primary | ICD-10-CM

## 2022-01-04 PROCEDURE — 99024 POSTOP FOLLOW-UP VISIT: CPT | Performed by: ORTHOPAEDIC SURGERY

## 2022-01-06 ENCOUNTER — TELEPHONE (OUTPATIENT)
Dept: ORTHOPEDIC SURGERY | Age: 82
End: 2022-01-06

## 2022-01-06 NOTE — TELEPHONE ENCOUNTER
Armida Rodrigues from [address] [place] [address] [phone]  needs verbal order for PT/OT/ [facility]

## 2022-01-09 NOTE — PROGRESS NOTES
DIAGNOSIS:  Left femoral neck fracture, status post cemented bipolar hemiarthroplasty. DATE OF SURGERY:  12/17/2021, Dr Te Carrizales  . HISTORY OF PRESENT ILLNESS:  Ms. Amy Wiggins [de-identified] y.o.  female who came in today for 2 weeks postoperative visit. The patient denies any significant pain in the left hip 5/10. She  has been walking weightbearing as tolerated using a walker. No numbness or tingling sensation. No fever or Chills. PHYSICAL EXAMINATION:  The incision is completely healed left hip. No signs of any erythema or drainage. She has no pain with the active or passive range of motion of the right hip. She has intact sensation, distally, and she is neurovascularly intact. IMAGING:  X-rays were taken in the office today, AP pelvis and 2 views of the left hip and femur, and showed cemented hemiarthroplasty in good position. No signs of any lucency. IMPRESSION:  2 weeks out from left hip hemiarthroplasty and doing very well. PLAN:  I have told the patient to continue PT, weightbearing as tolerated, as well as strengthening exercises. The patient will come back for a follow up in 6 weeks. At that time, we will take AP pelvis and 2 views of the affected hip. As this patient has demonstrated risk factors for osteoporosis, such as age greater than [de-identified] years and evidence of a fracture, I have referred the patient back to the primary care physician for evaluation for osteoporosis, including consideration for DEXA scanning, if this is felt to be clinically indicated. The patient is advised to contact the primary care physician to follow-up for further evaluation.        Diana King MD

## 2022-01-13 ENCOUNTER — TELEPHONE (OUTPATIENT)
Dept: ORTHOPEDIC SURGERY | Age: 82
End: 2022-01-13

## 2022-01-13 NOTE — TELEPHONE ENCOUNTER
Garry Pyle CNP from [facility] saw pt today. Pt stated Percocet was making her sick. She gave pt 3 day supply of Norco, and was told to follow up with Dr Jayden Stanton for continual pain management.    If any questions for Marina Edwards pls call [phone]

## 2022-01-14 NOTE — TELEPHONE ENCOUNTER
said the prescription for Gailen Pride was okay and patient can call when more pain medication is needed.

## 2022-01-19 NOTE — DISCHARGE SUMMARY
Hospital Medicine Discharge Summary    Patient ID: Edilma Cristobal      Patient's PCP: Kojo Mcdaniel, APRN - CNP    Admit Date: 12/17/2021     Discharge Date: 12/21/2021      Admitting Provider: Tha Agee MD     Discharge Provider: Марина Levy MD     Discharge Diagnoses: Active Hospital Problems    Diagnosis     Left hip pain [M25.552]     Left displaced femoral neck fracture (HCC) [S72.002A]     COPD (chronic obstructive pulmonary disease) ([facility] [J44.9]     AAA (abdominal aortic aneurysm) ([facility] [I71.4]     Mild aortic stenosis [I35.0]     History of CVA (cerebrovascular accident) [Z86.73]     Benign essential HTN [I10]        The patient was seen and examined on day of discharge and this discharge summary is in conjunction with any daily progress note from day of discharge. Hospital Course: The patient is a [de-identified] y.o. female with HTN, HLD, COPD, aortic stenosis, AAA, bilateral carotid stenosis, peripheral vascular disease s/p femoral to femoral and femoral to pop bypass grafts, history of stroke who presented to [facility] ED with complaint of mechanical fall. Patient states that early this morning she was packing and tripped falling to her left side. She hit her left hip, ankle and elbow. She denies lightheadedness, dizziness, heart palpitations, chest pain, shortness of breath. She did not lose consciousness, she did not hit her head. She does have significant vascular history as above. She endorses history of stroke.  At present she endorses left hip pain and denies all other symptoms.       #Left femoral neck fracture  -Mechanical fall at home   -POD 2 sp left bessy hip arthroplasty   - pain well controlled  - avoid excessive pain meds  - start IS , start PT  - dvt prophylaxis  - dc to rehab        #HTN  -BP stable  - resume BB, hold norvasc, losartan and can resume as needed      #COPD no AE  -No home O2   - resume home inhalers     #AAA without rupture  -Seen on recent CT  -Stable     #Bilateral carotid stenosis  #Peripheral vascular disease s/p femoral to femoral and femoral to pop bypass grafts  - on ASA      #HLD  -Continue statin and zetia     #History of CVA  -No residual deficits noted     Mild dementia - stable on aricept       Physical Exam Performed:     /69   Pulse 80   Temp 98.6 °F (37 °C) (Oral)   Resp 16   Ht 5' 1\" (1.549 m)   Wt 117 lb (53.1 kg)   SpO2 95%   BMI 22.11 kg/m²       General:  Elderly female healthy appearing Awake, alert and oriented. Appears to be not in any distress  Mucous Membranes:  Pink , anicteric  Neck: No JVD, no carotid bruit, no thyromegaly  Chest:  Clear to auscultation bilaterally, no added sounds  Cardiovascular:  RRR S1S2 heard, no murmurs or gallops  Abdomen:  Soft, undistended, non tender, no organomegaly, BS present  Extremities: left hip dry dressing dry  No edema or cyanosis. Distal pulses well felt  Neurological : grossly normal    Labs: For convenience and continuity at follow-up the following most recent labs are provided:      CBC:    Lab Results   Component Value Date    WBC 10.0 12/20/2021    HGB 11.2 12/20/2021    HCT 33.9 12/20/2021     12/20/2021       Renal:    Lab Results   Component Value Date     12/20/2021    K 3.2 12/20/2021    K 3.4 12/18/2021     12/20/2021    CO2 31 12/20/2021    BUN 16 12/20/2021    CREATININE 0.6 12/20/2021    CALCIUM 8.5 12/20/2021         Significant Diagnostic Studies    Radiology:   XR PELVIS (1-2 VIEWS)   Final Result   Stable left hip post arthroplasty. XR HIP 2-3 VW W PELVIS LEFT   Final Result   No unexpected findings following left hip arthroplasty. XR FEMUR LEFT (MIN 2 VIEWS)   Final Result   Redemonstration of displaced left femoral neck fracture. No other acute   osseous abnormality of the left femur. XR CHEST PORTABLE   Final Result   No acute cardiopulmonary disease.          XR ELBOW LEFT (2 VIEWS)   Final Result   No acute osseous abnormality identified in the elbow. XR HIP 2-3 VW W PELVIS LEFT   Final Result   Left femoral neck fracture with proximal migration of the femoral shaft   relative to the hip joint. XR ANKLE LEFT (2 VIEWS)   Final Result   Osteopenia, with no acute osseous fracture. Consults:     IP CONSULT TO HOSPITALIST  IP CONSULT TO ORTHOPEDIC SURGERY  IP CONSULT TO SPIRITUAL SERVICES  IP CONSULT TO HOME CARE NEEDS    Disposition:  rehab     Condition at Discharge: Stable    Discharge Instructions/Follow-up:  pcp in 1 week    Code Status:  Prior     Activity: activity as tolerated    Diet: regular diet      Discharge Medications:     Discharge Medication List as of 12/21/2021  3:45 PM           Details   polyethylene glycol (GLYCOLAX) 17 g packet Take 17 g by mouth daily as needed for Constipation, Disp-30 each, R-0NO PRINT      oxyCODONE (ROXICODONE) 5 MG immediate release tablet Take 0.5 tablets by mouth every 6 hours as needed for Pain for up to 5 days. , Disp-10 tablet, R-0Print              Details   enoxaparin (LOVENOX) 40 MG/0.4ML injection Inject 0.4 mLs into the skin daily, Disp-16.8 mL, R-0Normal              Details   donepezil (ARICEPT) 5 MG tablet Take 5 mg by mouth daily At nightHistorical Med      ezetimibe (ZETIA) 10 MG tablet Take 10 mg by mouth dailyHistorical Med      losartan (COZAAR) 25 MG tablet Take 25 mg by mouth dailyHistorical Med      fluticasone-salmeterol (ADVAIR) 500-50 MCG/DOSE diskus inhaler Inhale 1 puff into the lungs every 12 hoursHistorical Med      raNITIdine (ZANTAC) 150 MG tablet Take 1 tablet by mouth 2 times daily for 5 days, Disp-10 tablet, R-0Print      ondansetron (ZOFRAN) 4 MG tablet Take 1 tablet by mouth every 4 hours as needed for Nausea or Vomiting, Disp-20 tablet, R-0Normal      Probiotic Product (PROBIOTIC ADVANCED PO) Take by mouthHistorical Med      Coenzyme Q10 (COQ10 PO) Take by mouthHistorical Med      aspirin 81 MG tablet Take 81 mg

## 2022-01-29 ENCOUNTER — HOSPITAL ENCOUNTER (OUTPATIENT)
Dept: CT IMAGING | Age: 82
Discharge: HOME | End: 2022-01-29
Payer: MEDICARE

## 2022-01-29 DIAGNOSIS — R51.9 CHRONIC NONINTRACTABLE HEADACHE, UNSPECIFIED HEADACHE TYPE: ICD-10-CM

## 2022-01-29 DIAGNOSIS — G89.29 CHRONIC NONINTRACTABLE HEADACHE, UNSPECIFIED HEADACHE TYPE: ICD-10-CM

## 2022-01-29 DIAGNOSIS — Z86.73 HISTORY OF CEREBROVASCULAR ACCIDENT: ICD-10-CM

## 2022-01-29 PROCEDURE — 70498 CT ANGIOGRAPHY NECK: CPT

## 2022-01-29 PROCEDURE — 6360000004 HC RX CONTRAST MEDICATION: Performed by: NURSE PRACTITIONER

## 2022-01-29 RX ADMIN — IOPAMIDOL 85 ML: 755 INJECTION, SOLUTION INTRAVENOUS at 12:32

## 2022-02-15 ENCOUNTER — OFFICE VISIT (OUTPATIENT)
Dept: ORTHOPEDIC SURGERY | Age: 82
End: 2022-02-15

## 2022-02-15 VITALS — WEIGHT: 117 LBS | HEIGHT: 61 IN | BODY MASS INDEX: 22.09 KG/M2

## 2022-02-15 DIAGNOSIS — S72.002A LEFT DISPLACED FEMORAL NECK FRACTURE (HCC): Primary | ICD-10-CM

## 2022-02-15 PROCEDURE — 99024 POSTOP FOLLOW-UP VISIT: CPT | Performed by: ORTHOPAEDIC SURGERY

## 2022-02-15 NOTE — PROGRESS NOTES
DIAGNOSIS:  Left femoral neck fracture, status post cemented bipolar hemiarthroplasty, Anterior-lateral.    DATE OF SURGERY:  12/17/2021, Dr Laura Wynne  . HISTORY OF PRESENT ILLNESS:  Ms. Aurelia Ferguson [de-identified] y.o.  female who came in today for 2 months postoperative visit. The patient denies any significant pain in the left hip 6/10. She has been walking weightbearing as tolerated using a walker with [facility] PT. No numbness or tingling sensation. No fever or Chills. PHYSICAL EXAMINATION:  The incision is completely healed left hip. No signs of any erythema or drainage. She has no pain with the active or passive range of motion of the right hip. She has intact sensation, distally, and she is neurovascularly intact. IMAGING:  X-rays were taken in the office today, AP pelvis and 2 views of the left hip and femur, and showed cemented hemiarthroplasty in good position. No signs of any lucency. IMPRESSION:  2 months out from left hip hemiarthroplasty and doing very well. PLAN:  I have told the patient to continue PT, weightbearing as tolerated, as well as strengthening exercises. The patient will come back for a follow up in 6 weeks. At that time, we will take AP pelvis and 2 views of the affected hip. As this patient has demonstrated risk factors for osteoporosis, such as age greater than [de-identified] years and evidence of a fracture, I have referred the patient back to the primary care physician for evaluation for osteoporosis, including consideration for DEXA scanning, if this is felt to be clinically indicated. The patient is advised to contact the primary care physician to follow-up for further evaluation.        Galdino Grace MD

## 2022-03-29 ENCOUNTER — OFFICE VISIT (OUTPATIENT)
Dept: ORTHOPEDIC SURGERY | Age: 82
End: 2022-03-29
Payer: COMMERCIAL

## 2022-03-29 VITALS — HEIGHT: 61 IN | BODY MASS INDEX: 22.11 KG/M2

## 2022-03-29 DIAGNOSIS — S72.002A LEFT DISPLACED FEMORAL NECK FRACTURE (HCC): Primary | ICD-10-CM

## 2022-03-29 PROCEDURE — 99213 OFFICE O/P EST LOW 20 MIN: CPT | Performed by: ORTHOPAEDIC SURGERY

## 2022-03-29 NOTE — PROGRESS NOTES
DIAGNOSIS:  Left femoral neck fracture, status post cemented bipolar hemiarthroplasty, Anterior-lateral.    DATE OF SURGERY:  12/17/2021, Dr Cesar Marino  . HISTORY OF PRESENT ILLNESS:  Ms. Radha Leone [de-identified] y.o.  female who came in today for 3 months postoperative visit. The patient denies any significant pain in the left hip 0/10. She has been walking weightbearing as tolerated using a cane and done with HH PT. No numbness or tingling sensation. No fever or Chills.     Past Medical History:   Diagnosis Date    CVA (cerebral vascular accident) ([facility] [address]     Hyperlipidemia     Hypertension     Lyme disease        Past Surgical History:   Procedure Laterality Date    APPENDECTOMY      BACK SURGERY      HIP SURGERY Left 12/17/2021    LEFT HIP HEMIARTHROPLASTY performed by Uzma Villa MD at [place] [address] ARTHROSCOPY Right 2/26/2020    RIGHT SHOULDER ARTHROSCOPY WITH DEBRIDEMENT, ROTATOR CUFF REPAIR WITH REGENETEN PATCH, SUBACROMIAL DECOMPRESSION, BICEPS TENODESIS, EXCISION OF MASS  -BLOCK performed by Karen Bustillos MD at [address] [place] [address] Right 02/26/2020    debridement, rotator cuff repair, Regeneten patch        Social History     Socioeconomic History    Marital status:      Spouse name: Not on file    Number of children: Not on file    Years of education: Not on file    Highest education level: Not on file   Occupational History    Not on file   Tobacco Use    Smoking status: Former Smoker     Packs/day: 1.50     Types: Cigarettes    Smokeless tobacco: Never Used   Substance and Sexual Activity    Alcohol use: Not Currently    Drug use: Not Currently     Types: Marijuana [place])    Sexual activity: Not on file   Other Topics Concern    Not on file   Social History Narrative    Not on file     Social Determinants of Health     Financial Resource Strain:     Difficulty of Paying Living Expenses: Not on file   Food Insecurity:     Worried About [address] in the Last Year: Not on file    Ran Out of Food in the Last Year: Not on file   Transportation Needs:     Lack of Transportation (Medical): Not on file    Lack of Transportation (Non-Medical): Not on file   Physical Activity:     Days of Exercise per Week: Not on file    Minutes of Exercise per Session: Not on file   Stress:     Feeling of Stress : Not on file   Social Connections:     Frequency of Communication with Friends and Family: Not on file    Frequency of Social Gatherings with Friends and Family: Not on file    Attends [employer] Services: Not on file    Active Member of [address] [place] [address] [employer] or Organizations: Not on file    Attends Club or Organization Meetings: Not on file    Marital Status: Not on file   Intimate Partner Violence:     Fear of Current or Ex-Partner: Not on file    Emotionally Abused: Not on file    Physically Abused: Not on file    Sexually Abused: Not on file   Housing Stability:     Unable to Pay for Housing in the Last Year: Not on file    Number of [place] in the Last Year: Not on file    Unstable Housing in the Last Year: Not on file       No family history on file.     Current Outpatient Medications on File Prior to Visit   Medication Sig Dispense Refill    donepezil (ARICEPT) 5 MG tablet Take 5 mg by mouth daily At night      ezetimibe (ZETIA) 10 MG tablet Take 10 mg by mouth daily      losartan (COZAAR) 25 MG tablet Take 25 mg by mouth daily      fluticasone-salmeterol (ADVAIR) 500-50 MCG/DOSE diskus inhaler Inhale 1 puff into the lungs every 12 hours      raNITIdine (ZANTAC) 150 MG tablet Take 1 tablet by mouth 2 times daily for 5 days 10 tablet 0    ondansetron (ZOFRAN) 4 MG tablet Take 1 tablet by mouth every 4 hours as needed for Nausea or Vomiting 20 tablet 0    Probiotic Product (PROBIOTIC ADVANCED PO) Take by mouth      Coenzyme Q10 (COQ10 PO) Take by mouth      aspirin 81 MG tablet Take 81 mg by mouth daily      atorvastatin (LIPITOR) 40 MG tablet Take 80 mg by mouth daily       VENLAFAXINE HCL PO Take 150 mg by mouth daily       Multiple Vitamins-Minerals (THERAPEUTIC MULTIVITAMIN-MINERALS) tablet Take 1 tablet by mouth daily      METOPROLOL TARTRATE PO Take 50 mg by mouth 2 times daily        No current facility-administered medications on file prior to visit. Pertinent items are noted in HPI  Review of systems reviewed from Patient History Form and available in the patient's chart under the Media tab. No change noted. PHYSICAL EXAMINATION:  Ms. Jozef Mcneill is a very pleasant [de-identified] y.o.  female who presents today in no acute distress, awake, alert, and oriented. She is well dressed, nourished and  groomed. Patient with normal affect. Height is  5' 1\" (1.549 m), weight is  , Body mass index is 22.11 kg/m². Resting respiratory rate is 16. The patient walks with no limp using a cane. The incision is completely healed left hip. No signs of any erythema or drainage. She has no pain with the active or passive range of motion of the right hip. She has intact sensation, distally, and she is neurovascularly intact. IMAGING:  X-rays were taken in the office today, AP pelvis and 2 views of the left hip and femur, and showed cemented hemiarthroplasty in good position. No signs of any lucency. IMPRESSION:  3 months out from left hip hemiarthroplasty and doing very well. PLAN:  I have told the patient to continue PT, weightbearing as tolerated, as well as strengthening exercises. The patient will come back for a follow up in 3 months. At that time, we will take AP pelvis and 2 views of the affected hip. As this patient has demonstrated risk factors for osteoporosis, such as age greater than [de-identified] years and evidence of a fracture, I have referred the patient back to the primary care physician for evaluation for osteoporosis, including consideration for DEXA scanning, if this is felt to be clinically indicated.   The patient is advised to contact the primary care physician to follow-up for further evaluation.        Colt Quesada MD

## 2022-05-23 ENCOUNTER — TELEPHONE (OUTPATIENT)
Dept: ORTHOPEDIC SURGERY | Age: 82
End: 2022-05-23

## 2022-05-23 NOTE — TELEPHONE ENCOUNTER
Left message for patient. Asked her to return call to office. Need more information prior to discussing the request with Dr Matt Ang tomorrow. Last patient was seen, she was doing well.

## 2022-05-23 NOTE — TELEPHONE ENCOUNTER
General Question     Subject: PATIENT IS REQUESTING PAIN MEDICATION STATES SHE IS IN PAIN.  PLEASE ADVISE   Patient Pretty Mosquera  Contact Number: [phone]

## 2022-05-23 NOTE — TELEPHONE ENCOUNTER
Patient reports falling a few weeks ago, never went to be checked out, and reports pain on the side of the hip. She has continued walking, but has shooting pains sometimes even while at rest. Scheduled her an office visit tomorrow to xray and evaluate appropriate treatment option.

## 2022-05-24 ENCOUNTER — OFFICE VISIT (OUTPATIENT)
Dept: ORTHOPEDIC SURGERY | Age: 82
End: 2022-05-24
Payer: MEDICARE

## 2022-05-24 VITALS — WEIGHT: 117 LBS | HEIGHT: 61 IN | BODY MASS INDEX: 22.09 KG/M2

## 2022-05-24 DIAGNOSIS — S72.002A LEFT DISPLACED FEMORAL NECK FRACTURE (HCC): Primary | ICD-10-CM

## 2022-05-24 DIAGNOSIS — S70.02XA CONTUSION OF LEFT HIP, INITIAL ENCOUNTER: ICD-10-CM

## 2022-05-24 PROCEDURE — 1123F ACP DISCUSS/DSCN MKR DOCD: CPT | Performed by: ORTHOPAEDIC SURGERY

## 2022-05-24 PROCEDURE — 99214 OFFICE O/P EST MOD 30 MIN: CPT | Performed by: ORTHOPAEDIC SURGERY

## 2022-05-24 RX ORDER — NAPROXEN 500 MG/1
500 TABLET ORAL 2 TIMES DAILY WITH MEALS
Qty: 60 TABLET | Refills: 0 | Status: SHIPPED | OUTPATIENT
Start: 2022-05-24 | End: 2022-06-23

## 2022-05-24 NOTE — PROGRESS NOTES
DIAGNOSIS:   1- Left femoral neck fracture, status post cemented bipolar hemiarthroplasty, Anterior-lateral.  2- New fall with left hip pain/ contusion, Mid May 2022. DATE OF SURGERY:  12/17/2021, Dr Ge Cabral  . HISTORY OF PRESENT ILLNESS:  Ms. Vianca Li [de-identified] y.o.  female who came in today for evaluation of increasing left hip pain after a fall in Mid may 2022. The patient c/o pain in the left hip 10/10. She has been walking weightbearing as tolerated using a cane and done with HH PT. No numbness or tingling sensation. No fever or Chills.     Past Medical History:   Diagnosis Date    CVA (cerebral vascular accident) ([address]     Hyperlipidemia     Hypertension     Lyme disease        Past Surgical History:   Procedure Laterality Date    APPENDECTOMY      BACK SURGERY      HIP SURGERY Left 12/17/2021    LEFT HIP HEMIARTHROPLASTY performed by Claudia Arteaga MD at [place] [address] ARTHROSCOPY Right 2/26/2020    RIGHT SHOULDER ARTHROSCOPY WITH DEBRIDEMENT, ROTATOR CUFF REPAIR WITH REGENETEN PATCH, SUBACROMIAL DECOMPRESSION, BICEPS TENODESIS, EXCISION OF MASS  -BLOCK performed by Willie Jensen MD at [address] [place] [address] Right 02/26/2020    debridement, rotator cuff repair, Regeneten patch        Social History     Socioeconomic History    Marital status:      Spouse name: Not on file    Number of children: Not on file    Years of education: Not on file    Highest education level: Not on file   Occupational History    Not on file   Tobacco Use    Smoking status: Former Smoker     Packs/day: 1.50     Types: Cigarettes    Smokeless tobacco: Never Used   Substance and Sexual Activity    Alcohol use: Not Currently    Drug use: Not Currently     Types: Marijuana [place])    Sexual activity: Not on file   Other Topics Concern    Not on file   Social History Narrative    Not on file     Social Determinants of Health     Financial Resource Strain:     Difficulty of Paying Living Expenses: Not on file   Food Insecurity:     Worried About Running Out of Food in the Last Year: Not on file    Yandel of Food in the Last Year: Not on file   Transportation Needs:     Lack of Transportation (Medical): Not on file    Lack of Transportation (Non-Medical): Not on file   Physical Activity:     Days of Exercise per Week: Not on file    Minutes of Exercise per Session: Not on file   Stress:     Feeling of Stress : Not on file   Social Connections:     Frequency of Communication with Friends and Family: Not on file    Frequency of Social Gatherings with Friends and Family: Not on file    Attends [employer] Services: Not on file    Active Member of [address] [place] [address] [employer] or Organizations: Not on file    Attends Club or Organization Meetings: Not on file    Marital Status: Not on file   Intimate Partner Violence:     Fear of Current or Ex-Partner: Not on file    Emotionally Abused: Not on file    Physically Abused: Not on file    Sexually Abused: Not on file   Housing Stability:     Unable to Pay for Housing in the Last Year: Not on file    Number of [place] in the Last Year: Not on file    Unstable Housing in the Last Year: Not on file       History reviewed. No pertinent family history.     Current Outpatient Medications on File Prior to Visit   Medication Sig Dispense Refill    donepezil (ARICEPT) 5 MG tablet Take 5 mg by mouth daily At night      ezetimibe (ZETIA) 10 MG tablet Take 10 mg by mouth daily      losartan (COZAAR) 25 MG tablet Take 25 mg by mouth daily      fluticasone-salmeterol (ADVAIR) 500-50 MCG/DOSE diskus inhaler Inhale 1 puff into the lungs every 12 hours      raNITIdine (ZANTAC) 150 MG tablet Take 1 tablet by mouth 2 times daily for 5 days 10 tablet 0    ondansetron (ZOFRAN) 4 MG tablet Take 1 tablet by mouth every 4 hours as needed for Nausea or Vomiting 20 tablet 0    Probiotic Product (PROBIOTIC ADVANCED PO) Take by mouth      Coenzyme Q10 (COQ10 PO) Take by mouth  aspirin 81 MG tablet Take 81 mg by mouth daily      atorvastatin (LIPITOR) 40 MG tablet Take 80 mg by mouth daily       VENLAFAXINE HCL PO Take 150 mg by mouth daily       Multiple Vitamins-Minerals (THERAPEUTIC MULTIVITAMIN-MINERALS) tablet Take 1 tablet by mouth daily      METOPROLOL TARTRATE PO Take 50 mg by mouth 2 times daily        No current facility-administered medications on file prior to visit. Pertinent items are noted in HPI  Review of systems reviewed from Patient History Form and available in the patient's chart under the Media tab. No change noted. PHYSICAL EXAMINATION:  Ms. Artemio Bay is a very pleasant [de-identified] y.o.  female who presents today in no acute distress, awake, alert, and oriented. She is well dressed, nourished and  groomed. Patient with normal affect. Height is  5' 1\" (1.549 m), weight is 117 lb (53.1 kg), Body mass index is 22.11 kg/m². Resting respiratory rate is 16. The patient walks with no limp using a cane. The incision is completely healed left hip. No signs of any erythema or drainage. She has no pain with the active or passive range of motion of the right hip. She has intact sensation, distally, and she is neurovascularly intact. IMAGING:  X-rays were taken in the office today, AP pelvis and 2 views of the left hip and femur, and showed cemented hemiarthroplasty in good position. No signs of any lucency. IMPRESSION:    1- Left femoral neck fracture, status post cemented bipolar hemiarthroplasty, Anterior-lateral.  2- New fall with left hip pain/ contusion, Mid may 2022. PLAN:   I assured the patient that the xray is negative for new acute fracture. I have told the patient to continue weightbearing as tolerated, as well as strengthening exercises. I discussed with the patient that I think that she would really benefit from a course of physical therapy for further strengthening and stretching. She would like to do it on her own.  Naprosyn Rx sent. The patient will come back for a follow up in 6 weeks. At that time, we will take AP pelvis and 2 views of the affected hip. As this patient has demonstrated risk factors for osteoporosis, such as age greater than [de-identified] years and evidence of a fracture, I have referred the patient back to the primary care physician for evaluation for osteoporosis, including consideration for DEXA scanning, if this is felt to be clinically indicated. The patient is advised to contact the primary care physician to follow-up for further evaluation.        Lorrie Yan MD

## 2022-05-25 PROBLEM — S70.02XA CONTUSION OF LEFT HIP: Status: ACTIVE | Noted: 2022-05-25

## 2022-07-11 ENCOUNTER — HOSPITAL ENCOUNTER (INPATIENT)
Age: 82
LOS: 2 days | Discharge: HOME | DRG: 690 | End: 2022-07-13
Attending: EMERGENCY MEDICINE | Admitting: INTERNAL MEDICINE
Payer: MEDICARE

## 2022-07-11 ENCOUNTER — APPOINTMENT (OUTPATIENT)
Dept: CT IMAGING | Age: 82
DRG: 690 | End: 2022-07-11
Payer: MEDICARE

## 2022-07-11 DIAGNOSIS — K59.00 CONSTIPATION, UNSPECIFIED CONSTIPATION TYPE: ICD-10-CM

## 2022-07-11 DIAGNOSIS — I71.40 ABDOMINAL AORTIC ANEURYSM (AAA) WITHOUT RUPTURE: ICD-10-CM

## 2022-07-11 DIAGNOSIS — N10 ACUTE PYELONEPHRITIS: Primary | ICD-10-CM

## 2022-07-11 PROBLEM — N12 PYELONEPHRITIS: Status: ACTIVE | Noted: 2022-07-11

## 2022-07-11 LAB
A/G RATIO: 1.1 (ref 1.1–2.2)
ALBUMIN SERPL-MCNC: 3.6 G/DL (ref 3.4–5)
ALP BLD-CCNC: 156 U/L (ref 40–129)
ALT SERPL-CCNC: 17 U/L (ref 10–40)
ANION GAP SERPL CALCULATED.3IONS-SCNC: 10 MMOL/L (ref 3–16)
AST SERPL-CCNC: 29 U/L (ref 15–37)
BACTERIA: ABNORMAL /HPF
BASOPHILS ABSOLUTE: 0.1 K/UL (ref 0–0.2)
BASOPHILS RELATIVE PERCENT: 1.1 %
BILIRUB SERPL-MCNC: <0.2 MG/DL (ref 0–1)
BILIRUBIN URINE: NEGATIVE
BLOOD, URINE: ABNORMAL
BUN BLDV-MCNC: 25 MG/DL (ref 7–20)
CALCIUM SERPL-MCNC: 9.3 MG/DL (ref 8.3–10.6)
CHLORIDE BLD-SCNC: 101 MMOL/L (ref 99–110)
CLARITY: CLEAR
CO2: 27 MMOL/L (ref 21–32)
COLOR: YELLOW
CREAT SERPL-MCNC: 0.9 MG/DL (ref 0.6–1.2)
EOSINOPHILS ABSOLUTE: 0.4 K/UL (ref 0–0.6)
EOSINOPHILS RELATIVE PERCENT: 4.4 %
EPITHELIAL CELLS, UA: ABNORMAL /HPF (ref 0–5)
GFR AFRICAN AMERICAN: >60
GFR NON-AFRICAN AMERICAN: >60
GLUCOSE BLD-MCNC: 108 MG/DL (ref 70–99)
GLUCOSE URINE: NEGATIVE MG/DL
HCT VFR BLD CALC: 38.2 % (ref 36–48)
HEMOGLOBIN: 12.9 G/DL (ref 12–16)
INFLUENZA A: NOT DETECTED
INFLUENZA B: NOT DETECTED
KETONES, URINE: NEGATIVE MG/DL
LEUKOCYTE ESTERASE, URINE: ABNORMAL
LYMPHOCYTES ABSOLUTE: 1.7 K/UL (ref 1–5.1)
LYMPHOCYTES RELATIVE PERCENT: 17 %
MCH RBC QN AUTO: 30.1 PG (ref 26–34)
MCHC RBC AUTO-ENTMCNC: 33.7 G/DL (ref 31–36)
MCV RBC AUTO: 89.2 FL (ref 80–100)
MICROSCOPIC EXAMINATION: YES
MONOCYTES ABSOLUTE: 0.7 K/UL (ref 0–1.3)
MONOCYTES RELATIVE PERCENT: 6.7 %
NEUTROPHILS ABSOLUTE: 7.1 K/UL (ref 1.7–7.7)
NEUTROPHILS RELATIVE PERCENT: 70.8 %
NITRITE, URINE: POSITIVE
PDW BLD-RTO: 14.2 % (ref 12.4–15.4)
PH UA: 6 (ref 5–8)
PLATELET # BLD: 242 K/UL (ref 135–450)
PMV BLD AUTO: 7.7 FL (ref 5–10.5)
POTASSIUM REFLEX MAGNESIUM: 4 MMOL/L (ref 3.5–5.1)
PROTEIN UA: 30 MG/DL
RBC # BLD: 4.28 M/UL (ref 4–5.2)
RBC UA: >100 /HPF (ref 0–4)
SARS-COV-2 RNA, RT PCR: NOT DETECTED
SODIUM BLD-SCNC: 138 MMOL/L (ref 136–145)
SPECIFIC GRAVITY UA: 1.02 (ref 1–1.03)
TOTAL PROTEIN: 6.8 G/DL (ref 6.4–8.2)
URINE REFLEX TO CULTURE: YES
URINE TYPE: ABNORMAL
UROBILINOGEN, URINE: 0.2 E.U./DL
WBC # BLD: 10 K/UL (ref 4–11)
WBC UA: ABNORMAL /HPF (ref 0–5)

## 2022-07-11 PROCEDURE — 99285 EMERGENCY DEPT VISIT HI MDM: CPT

## 2022-07-11 PROCEDURE — 85025 COMPLETE CBC W/AUTO DIFF WBC: CPT

## 2022-07-11 PROCEDURE — G0378 HOSPITAL OBSERVATION PER HR: HCPCS

## 2022-07-11 PROCEDURE — 96365 THER/PROPH/DIAG IV INF INIT: CPT

## 2022-07-11 PROCEDURE — 6360000004 HC RX CONTRAST MEDICATION: Performed by: NURSE PRACTITIONER

## 2022-07-11 PROCEDURE — 6360000002 HC RX W HCPCS: Performed by: INTERNAL MEDICINE

## 2022-07-11 PROCEDURE — 87636 SARSCOV2 & INF A&B AMP PRB: CPT

## 2022-07-11 PROCEDURE — 87186 SC STD MICRODIL/AGAR DIL: CPT

## 2022-07-11 PROCEDURE — 87088 URINE BACTERIA CULTURE: CPT

## 2022-07-11 PROCEDURE — 6360000002 HC RX W HCPCS: Performed by: NURSE PRACTITIONER

## 2022-07-11 PROCEDURE — 2580000003 HC RX 258: Performed by: NURSE PRACTITIONER

## 2022-07-11 PROCEDURE — 87086 URINE CULTURE/COLONY COUNT: CPT

## 2022-07-11 PROCEDURE — 80053 COMPREHEN METABOLIC PANEL: CPT

## 2022-07-11 PROCEDURE — 81001 URINALYSIS AUTO W/SCOPE: CPT

## 2022-07-11 PROCEDURE — 51701 INSERT BLADDER CATHETER: CPT

## 2022-07-11 PROCEDURE — 96366 THER/PROPH/DIAG IV INF ADDON: CPT

## 2022-07-11 PROCEDURE — 1200000000 HC SEMI PRIVATE

## 2022-07-11 PROCEDURE — 74177 CT ABD & PELVIS W/CONTRAST: CPT

## 2022-07-11 PROCEDURE — 36415 COLL VENOUS BLD VENIPUNCTURE: CPT

## 2022-07-11 PROCEDURE — 6370000000 HC RX 637 (ALT 250 FOR IP): Performed by: INTERNAL MEDICINE

## 2022-07-11 PROCEDURE — 2580000003 HC RX 258: Performed by: INTERNAL MEDICINE

## 2022-07-11 PROCEDURE — 96372 THER/PROPH/DIAG INJ SC/IM: CPT

## 2022-07-11 RX ORDER — METOPROLOL TARTRATE 50 MG/1
50 TABLET, FILM COATED ORAL 2 TIMES DAILY
Status: DISCONTINUED | OUTPATIENT
Start: 2022-07-11 | End: 2022-07-13 | Stop reason: HOSPADM

## 2022-07-11 RX ORDER — PROMETHAZINE HYDROCHLORIDE 25 MG/1
12.5 TABLET ORAL EVERY 6 HOURS PRN
Status: DISCONTINUED | OUTPATIENT
Start: 2022-07-11 | End: 2022-07-13 | Stop reason: HOSPADM

## 2022-07-11 RX ORDER — ENOXAPARIN SODIUM 100 MG/ML
30 INJECTION SUBCUTANEOUS NIGHTLY
Status: DISCONTINUED | OUTPATIENT
Start: 2022-07-11 | End: 2022-07-13

## 2022-07-11 RX ORDER — DONEPEZIL HYDROCHLORIDE 5 MG/1
5 TABLET, FILM COATED ORAL NIGHTLY
Status: DISCONTINUED | OUTPATIENT
Start: 2022-07-11 | End: 2022-07-13 | Stop reason: HOSPADM

## 2022-07-11 RX ORDER — ACETAMINOPHEN 650 MG/1
650 SUPPOSITORY RECTAL EVERY 6 HOURS PRN
Status: DISCONTINUED | OUTPATIENT
Start: 2022-07-11 | End: 2022-07-13 | Stop reason: HOSPADM

## 2022-07-11 RX ORDER — POTASSIUM CHLORIDE 7.45 MG/ML
10 INJECTION INTRAVENOUS PRN
Status: DISCONTINUED | OUTPATIENT
Start: 2022-07-11 | End: 2022-07-13 | Stop reason: HOSPADM

## 2022-07-11 RX ORDER — ASPIRIN 81 MG/1
81 TABLET, CHEWABLE ORAL DAILY
Status: DISCONTINUED | OUTPATIENT
Start: 2022-07-12 | End: 2022-07-13 | Stop reason: HOSPADM

## 2022-07-11 RX ORDER — ATORVASTATIN CALCIUM 40 MG/1
80 TABLET, FILM COATED ORAL DAILY
Status: DISCONTINUED | OUTPATIENT
Start: 2022-07-12 | End: 2022-07-13 | Stop reason: HOSPADM

## 2022-07-11 RX ORDER — PANTOPRAZOLE SODIUM 40 MG/1
40 TABLET, DELAYED RELEASE ORAL DAILY
COMMUNITY

## 2022-07-11 RX ORDER — HYDROXYZINE HYDROCHLORIDE 25 MG/1
25 TABLET, FILM COATED ORAL 2 TIMES DAILY PRN
COMMUNITY

## 2022-07-11 RX ORDER — DOCUSATE SODIUM 100 MG/1
200 CAPSULE, LIQUID FILLED ORAL 2 TIMES DAILY
Status: DISCONTINUED | OUTPATIENT
Start: 2022-07-11 | End: 2022-07-13

## 2022-07-11 RX ORDER — LOSARTAN POTASSIUM 25 MG/1
50 TABLET ORAL DAILY
Status: DISCONTINUED | OUTPATIENT
Start: 2022-07-12 | End: 2022-07-13 | Stop reason: HOSPADM

## 2022-07-11 RX ORDER — LOSARTAN POTASSIUM AND HYDROCHLOROTHIAZIDE 25; 100 MG/1; MG/1
0.5 TABLET ORAL DAILY
Status: ON HOLD | COMMUNITY
End: 2022-07-13 | Stop reason: SDUPTHER

## 2022-07-11 RX ORDER — SODIUM CHLORIDE 9 MG/ML
1000 INJECTION, SOLUTION INTRAVENOUS CONTINUOUS
Status: ACTIVE | OUTPATIENT
Start: 2022-07-11 | End: 2022-07-12

## 2022-07-11 RX ORDER — VITAMIN E 268 MG
400 CAPSULE ORAL DAILY
COMMUNITY

## 2022-07-11 RX ORDER — SODIUM CHLORIDE 9 MG/ML
250 INJECTION, SOLUTION INTRAVENOUS PRN
Status: DISCONTINUED | OUTPATIENT
Start: 2022-07-11 | End: 2022-07-13 | Stop reason: HOSPADM

## 2022-07-11 RX ORDER — VITAMIN B COMPLEX
1 CAPSULE ORAL DAILY
COMMUNITY

## 2022-07-11 RX ORDER — SODIUM CHLORIDE 0.9 % (FLUSH) 0.9 %
10 SYRINGE (ML) INJECTION PRN
Status: DISCONTINUED | OUTPATIENT
Start: 2022-07-11 | End: 2022-07-13 | Stop reason: HOSPADM

## 2022-07-11 RX ORDER — AMLODIPINE BESYLATE 10 MG/1
10 TABLET ORAL DAILY
COMMUNITY

## 2022-07-11 RX ORDER — SODIUM CHLORIDE 0.9 % (FLUSH) 0.9 %
10 SYRINGE (ML) INJECTION EVERY 12 HOURS SCHEDULED
Status: DISCONTINUED | OUTPATIENT
Start: 2022-07-11 | End: 2022-07-13 | Stop reason: HOSPADM

## 2022-07-11 RX ORDER — ONDANSETRON 2 MG/ML
4 INJECTION INTRAMUSCULAR; INTRAVENOUS EVERY 6 HOURS PRN
Status: DISCONTINUED | OUTPATIENT
Start: 2022-07-11 | End: 2022-07-13 | Stop reason: HOSPADM

## 2022-07-11 RX ORDER — MAGNESIUM SULFATE IN WATER 40 MG/ML
2000 INJECTION, SOLUTION INTRAVENOUS PRN
Status: DISCONTINUED | OUTPATIENT
Start: 2022-07-11 | End: 2022-07-13 | Stop reason: HOSPADM

## 2022-07-11 RX ORDER — ASCORBIC ACID 500 MG
500 TABLET ORAL DAILY
COMMUNITY

## 2022-07-11 RX ORDER — ACETAMINOPHEN 325 MG/1
650 TABLET ORAL EVERY 6 HOURS PRN
Status: DISCONTINUED | OUTPATIENT
Start: 2022-07-11 | End: 2022-07-13 | Stop reason: HOSPADM

## 2022-07-11 RX ORDER — BUSPIRONE HYDROCHLORIDE 5 MG/1
5 TABLET ORAL 2 TIMES DAILY
COMMUNITY

## 2022-07-11 RX ADMIN — Medication 10 ML: at 23:26

## 2022-07-11 RX ADMIN — ENOXAPARIN SODIUM 30 MG: 100 INJECTION SUBCUTANEOUS at 23:26

## 2022-07-11 RX ADMIN — ACETAMINOPHEN 650 MG: 325 TABLET ORAL at 23:36

## 2022-07-11 RX ADMIN — METOPROLOL TARTRATE 50 MG: 50 TABLET, FILM COATED ORAL at 23:26

## 2022-07-11 RX ADMIN — DONEPEZIL HYDROCHLORIDE 5 MG: 5 TABLET, FILM COATED ORAL at 23:26

## 2022-07-11 RX ADMIN — SODIUM CHLORIDE 1000 ML: 9 INJECTION, SOLUTION INTRAVENOUS at 23:34

## 2022-07-11 RX ADMIN — IOPAMIDOL 75 ML: 755 INJECTION, SOLUTION INTRAVENOUS at 16:51

## 2022-07-11 RX ADMIN — CEFTRIAXONE SODIUM 1000 MG: 1 INJECTION, POWDER, FOR SOLUTION INTRAMUSCULAR; INTRAVENOUS at 16:17

## 2022-07-11 ASSESSMENT — ENCOUNTER SYMPTOMS
RHINORRHEA: 0
FACIAL SWELLING: 0
SHORTNESS OF BREATH: 0
ABDOMINAL PAIN: 0
SORE THROAT: 0
COLOR CHANGE: 0

## 2022-07-11 ASSESSMENT — PAIN DESCRIPTION - LOCATION: LOCATION: HEAD

## 2022-07-11 ASSESSMENT — PAIN SCALES - GENERAL: PAINLEVEL_OUTOF10: 7

## 2022-07-11 ASSESSMENT — PAIN - FUNCTIONAL ASSESSMENT: PAIN_FUNCTIONAL_ASSESSMENT: NONE - DENIES PAIN

## 2022-07-11 NOTE — ED PROVIDER NOTES
I independently evaluated and obtained a history and physical on Brittnee Aragon. I personally saw the patient and performed a substantive portion of the visit including all aspects of the medical decision making. All diagnostic, treatment, and disposition assistants were made to myself in conjunction the advanced practice provider. For further details of this patient's emergency department encounter, please see the advanced practice provider's documentation. History: Patient is a 61-year-old female who reports with 2 days of blood in the urine. Patient denies any fever, severe pain, or dysuria. Reports her symptoms are moderate constant and worsening. Physician Exam: Pleasant elderly  female. Regular rate and rhythm. No facial droop or focal neurologic deficits. MDM:    I personally saw the patient and performed a substantive portion of the visit including all aspects of the medical decision making. Work-up is concerning for acute pyelonephritis both with urinalysis and CT imaging. Patient was given IV antibiotics and admitted given pyelonephritis and age. She expresses understanding and agreement with this plan. FINAL IMPRESSION      1. Acute pyelonephritis    2. Constipation, unspecified constipation type    3.  Abdominal aortic aneurysm (AAA) without rupture ([facility] [address]             Doc Gordillo MD  07/11/22 [id number]

## 2022-07-11 NOTE — ED NOTES
1907- Perfect Serve sent to Hospitalist for consult. [address] with callback.      Maddy Dickinson, MARTIN  07/11/22 2455

## 2022-07-11 NOTE — ED PROVIDER NOTES
[address] ED  EMERGENCY DEPARTMENT ENCOUNTER        Pt Name: Trinity Bateman  MRN: [id number]  [place] 1940  Dateof evaluation: 7/11/2022  Provider: AGAPITO Matos CNP  PCP: AGAPITO Meyer CNP  ED Attending: No att. providers found    [facility]       Chief Complaint   Patient presents with    Hematuria     reports the past few days has noticed some blood after urination when wiping       HISTORY OF PRESENTILLNESS   (Location/Symptom, Timing/Onset, Context/Setting, Quality, Duration, Modifying Factors, Severity)  Note limiting factors. Trinity Bateman is a 81 y.o. female for blood with wiping. Onset was 2 days. Context includes patient here for concerns for hematuria. Patient reports that when she wipes she has pink-tinged to blood clots. Patient denies any dysuria. Patient reports that she recently had vascular procedure in which they accessed both of her femoral sites and was sent in by her vascular doctor to be evaluated. Alleviating factors include nothing. Aggravating factors include nothing. Pain is 010. Nothing has been used for pain today. Nursing Notes were all reviewed and agreed with or any disagreements were addressed  in the HPI. REVIEW OF SYSTEMS    (2-9 systems for level 4, 10 or more for level 5)     Review of Systems   Constitutional: Negative for activity change, appetite change and fever. HENT: Negative for congestion, facial swelling, rhinorrhea and sore throat. Eyes: Negative for visual disturbance. Respiratory: Negative for shortness of breath. Cardiovascular: Negative for chest pain. Gastrointestinal: Negative for abdominal pain. Genitourinary: Positive for hematuria. Negative for difficulty urinating. Musculoskeletal: Negative for arthralgias and myalgias. Skin: Negative for color change and rash. Neurological: Negative for dizziness and light-headedness.    Psychiatric/Behavioral: Negative for agitation. All other systems reviewed and are negative. Positives and Pertinent negatives as per HPI. Except as noted above in the ROS, all other systems were reviewed and negative.        PAST MEDICAL HISTORY     Past Medical History:   Diagnosis Date    CVA [facility]cerebral vascular accident) ([facility] [address]     Hyperlipidemia     Hypertension     Lyme disease          SURGICAL HISTORY       Past Surgical History:   Procedure Laterality Date    APPENDECTOMY      BACK SURGERY      HIP SURGERY Left 12/17/2021    LEFT HIP HEMIARTHROPLASTY performed by Nury Brito MD at [place] [address] ARTHROSCOPY Right 2/26/2020    RIGHT SHOULDER ARTHROSCOPY WITH DEBRIDEMENT, ROTATOR CUFF REPAIR WITH REGENETEN PATCH, SUBACROMIAL DECOMPRESSION, BICEPS TENODESIS, EXCISION OF MASS  -BLOCK performed by Valeria Gomez MD at [facility] Right 02/26/2020    debridement, rotator cuff repair, Regeneten patch          CURRENT MEDICATIONS       Previous Medications    ASPIRIN 81 MG TABLET    Take 81 mg by mouth daily    ATORVASTATIN (LIPITOR) 40 MG TABLET    Take 80 mg by mouth daily     COENZYME Q10 (COQ10 PO)    Take by mouth    DONEPEZIL (ARICEPT) 5 MG TABLET    Take 5 mg by mouth daily At night    EZETIMIBE (ZETIA) 10 MG TABLET    Take 10 mg by mouth daily    FLUTICASONE-SALMETEROL (ADVAIR) 500-50 MCG/DOSE DISKUS INHALER    Inhale 1 puff into the lungs every 12 hours    LOSARTAN (COZAAR) 25 MG TABLET    Take 25 mg by mouth daily    METOPROLOL TARTRATE PO    Take 50 mg by mouth 2 times daily     MULTIPLE VITAMINS-MINERALS (THERAPEUTIC MULTIVITAMIN-MINERALS) TABLET    Take 1 tablet by mouth daily    NAPROXEN (NAPROSYN) 500 MG TABLET    Take 1 tablet by mouth 2 times daily (with meals)    ONDANSETRON (ZOFRAN) 4 MG TABLET    Take 1 tablet by mouth every 4 hours as needed for Nausea or Vomiting    PROBIOTIC PRODUCT (PROBIOTIC ADVANCED PO)    Take by mouth    RANITIDINE (ZANTAC) 150 MG TABLET    Take 1 tablet by mouth 2 times daily for 5 days    VENLAFAXINE HCL PO    Take 150 mg by mouth daily          ALLERGIES     Carvedilol, Penicillins, Sulfa antibiotics, and Percocet [oxycodone-acetaminophen]    FAMILY HISTORY     No family history on file. SOCIAL HISTORY       Social History     Socioeconomic History    Marital status:      Spouse name: Not on file    Number of children: Not on file    Years of education: Not on file    Highest education level: Not on file   Occupational History    Not on file   Tobacco Use    Smoking status: Former Smoker     Packs/day: 1.50     Types: Cigarettes    Smokeless tobacco: Never Used   Substance and Sexual Activity    Alcohol use: Not Currently    Drug use: Not Currently     Types: Marijuana Marco Kos)    Sexual activity: Not on file   Other Topics Concern    Not on file   Social History Narrative    Not on file     Social Determinants of Health     Financial Resource Strain:     Difficulty of Paying Living Expenses: Not on file   Food Insecurity:     Worried About [address] in the Last Year: Not on file    [address] in the Last Year: Not on file   Transportation Needs:     Lack of Transportation (Medical): Not on file    Lack of Transportation (Non-Medical):  Not on file   Physical Activity:     Days of Exercise per Week: Not on file    Minutes of Exercise per Session: Not on file   Stress:     Feeling of Stress : Not on file   Social Connections:     Frequency of Communication with Friends and Family: Not on file    Frequency of Social Gatherings with Friends and Family: Not on file    Attends Nondenominational Services: Not on file    Active Member of Clubs or Organizations: Not on file    Attends Club or Organization Meetings: Not on file    Marital Status: Not on file   Intimate Partner Violence:     Fear of Current or Ex-Partner: Not on file    Emotionally Abused: Not on file    Physically Abused: Not on file    Sexually Abused: Not on file Housing Stability:     Unable to Pay for Housing in the Last Year: Not on file    Number of Places Lived in the Last Year: Not on file    Unstable Housing in the Last Year: Not on file       SCREENINGS    Virgilio Coma Scale  Eye Opening: Spontaneous  Best Verbal Response: Oriented  Best Motor Response: Obeys commands  Allen Coma Scale Score: 15        PHYSICAL EXAM  (up to 7 for level 4, 8 or more for level 5)     ED Triage Vitals [07/11/22 1413]   BP Temp Temp Source Heart Rate Resp SpO2 Height Weight   125/67 99 °F (37.2 °C) Oral 62 18 97 % 5' 1\" (1.549 m) 110 lb (49.9 kg)       Physical Exam  Constitutional:       Appearance: She is well-developed. HENT:      Head: Normocephalic and atraumatic. Cardiovascular:      Rate and Rhythm: Normal rate. Pulmonary:      Effort: Pulmonary effort is normal. No respiratory distress. Abdominal:      General: There is no distension. Palpations: Abdomen is soft. Tenderness: There is no abdominal tenderness. Comments: Bilateral femoral sites with Tegaderm in place no hematomas noted. Musculoskeletal:         General: Normal range of motion. Cervical back: Normal range of motion. Skin:     General: Skin is warm and dry. Neurological:      Mental Status: She is alert and oriented to person, place, and time.          DIAGNOSTIC RESULTS   LABS:    Labs Reviewed   COMPREHENSIVE METABOLIC PANEL W/ REFLEX TO MG FOR LOW K - Abnormal; Notable for the following components:       Result Value    Glucose 108 (*)     BUN 25 (*)     Alkaline Phosphatase 156 (*)     All other components within normal limits   URINALYSIS WITH REFLEX TO CULTURE - Abnormal; Notable for the following components:    Blood, Urine LARGE (*)     Protein, UA 30 (*)     Nitrite, Urine POSITIVE (*)     Leukocyte Esterase, Urine LARGE (*)     All other components within normal limits   MICROSCOPIC URINALYSIS - Abnormal; Notable for the following components:    WBC, UA  (*) RBC, UA >100 (*)     Bacteria, UA 3+ (*)     All other components within normal limits   COVID-19 & INFLUENZA COMBO   CULTURE, URINE   CBC WITH AUTO DIFFERENTIAL       All other labs werewithin normal range or not returned as of this dictation. EKG: All EKG's are interpreted by the Emergency Department Physician who either signs or Co-signs this chart in the absence of a cardiologist.  Please see their note for interpretation of EKG. RADIOLOGY:     CT abdomen pelvis with IV contrast interpreted by radiologist for  Impression:    Abnormal wall thickening identified of the bladder to suggest cystitis with   correlation to urinalysis is recommended. Manfred Mormonism is abnormal enhancement of   the lower pole the right kidney with surrounding stranding to suggest a   pyelonephritis. Stool burden increased throughout the colon to suggest clinical presentation   of constipation with short segment of wall thickening of the sigmoid colon to   suggest possible mild inflammation and a colitis. 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 Interpretation per the Radiologist below, if available at the time of this note:    CT ABDOMEN PELVIS W IV CONTRAST Additional Contrast? None   Final Result   Abnormal wall thickening identified of the bladder to suggest cystitis with   correlation to urinalysis is recommended. There is abnormal enhancement of   the lower pole the right kidney with surrounding stranding to suggest a   pyelonephritis. Stool burden increased throughout the colon to suggest clinical presentation   of constipation with short segment of wall thickening of the sigmoid colon to   suggest possible mild inflammation and a colitis. 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 No results found. PROCEDURES   Unless otherwise noted below, none     Procedures     CRITICAL CARE TIME   N/A    CONSULTS:  IP CONSULT TO HOSPITALIST      EMERGENCYDEPARTMENT COURSE and DIFFERENTIAL DIAGNOSIS/MDM:   Vitals:    Vitals:    07/11/22 1413 07/11/22 1620 07/11/22 1830   BP: 125/67 (!) 160/73 (!) 116/97   Pulse: 62 66    Resp: 18 16    Temp: 99 °F (37.2 °C)     TempSrc: Oral     SpO2: 97% 94%    Weight: 110 lb (49.9 kg)     Height: 5' 1\" (1.549 m)         Patient was given the following medications:  Medications   cefTRIAXone (ROCEPHIN) 1000 mg IVPB in 50 mL D5W minibag (0 mg IntraVENous Stopped 7/11/22 1816)   iopamidol (ISOVUE-370) 76 % injection 75 mL (75 mLs IntraVENous Given 7/11/22 1651)       Patient was seen evaluated by myself and Dr. Brooklynn Chapman. Patient here for concerns for blood when she wipes after urination. Patient reports that she has recently had bilateral femoral catheterizations for vascular procedure at [facility].  Patient reports that her vascular doctor recommended that she be evaluated. On exam today the patient is awake and alert hemodynamically stable nontoxic in appearance. Patient does complain of some abdominal pain on the left side that radiates into the left groin. Lab values have been reviewed and interpreted. Patient did appear to have a pyelonephritis on her urinalysis and CT. Patient provided with Rocephin in the ED. Consult was placed to the hospitalist for admission. Hospitalist has accepted the patient. Patient's care was transferred to the inpatient unit. The patient tolerated their visit well. I have evaluated this patient. My supervising physician was available for consultation.  The patient and / or the family were informed of the results of any tests, a time was given to answer questions, a plan was proposed and they agreed

## 2022-07-12 PROBLEM — N10 ACUTE PYELONEPHRITIS: Status: ACTIVE | Noted: 2022-07-11

## 2022-07-12 LAB
A/G RATIO: 1.1 (ref 1.1–2.2)
ALBUMIN SERPL-MCNC: 3.2 G/DL (ref 3.4–5)
ALP BLD-CCNC: 137 U/L (ref 40–129)
ALT SERPL-CCNC: 13 U/L (ref 10–40)
ANION GAP SERPL CALCULATED.3IONS-SCNC: 13 MMOL/L (ref 3–16)
AST SERPL-CCNC: 24 U/L (ref 15–37)
BASOPHILS ABSOLUTE: 0.1 K/UL (ref 0–0.2)
BASOPHILS RELATIVE PERCENT: 1 %
BILIRUB SERPL-MCNC: <0.2 MG/DL (ref 0–1)
BUN BLDV-MCNC: 23 MG/DL (ref 7–20)
CALCIUM SERPL-MCNC: 8.9 MG/DL (ref 8.3–10.6)
CHLORIDE BLD-SCNC: 103 MMOL/L (ref 99–110)
CO2: 25 MMOL/L (ref 21–32)
CREAT SERPL-MCNC: 1.1 MG/DL (ref 0.6–1.2)
EOSINOPHILS ABSOLUTE: 0.4 K/UL (ref 0–0.6)
EOSINOPHILS RELATIVE PERCENT: 4.2 %
GFR AFRICAN AMERICAN: 58
GFR NON-AFRICAN AMERICAN: 48
GLUCOSE BLD-MCNC: 87 MG/DL (ref 70–99)
HCT VFR BLD CALC: 37.2 % (ref 36–48)
HEMOGLOBIN: 12.3 G/DL (ref 12–16)
LYMPHOCYTES ABSOLUTE: 2 K/UL (ref 1–5.1)
LYMPHOCYTES RELATIVE PERCENT: 20.3 %
MCH RBC QN AUTO: 29.8 PG (ref 26–34)
MCHC RBC AUTO-ENTMCNC: 33.2 G/DL (ref 31–36)
MCV RBC AUTO: 89.6 FL (ref 80–100)
MONOCYTES ABSOLUTE: 0.9 K/UL (ref 0–1.3)
MONOCYTES RELATIVE PERCENT: 9.1 %
NEUTROPHILS ABSOLUTE: 6.4 K/UL (ref 1.7–7.7)
NEUTROPHILS RELATIVE PERCENT: 65.4 %
PDW BLD-RTO: 14.1 % (ref 12.4–15.4)
PLATELET # BLD: 219 K/UL (ref 135–450)
PMV BLD AUTO: 8 FL (ref 5–10.5)
POTASSIUM REFLEX MAGNESIUM: 3.6 MMOL/L (ref 3.5–5.1)
RBC # BLD: 4.15 M/UL (ref 4–5.2)
SODIUM BLD-SCNC: 141 MMOL/L (ref 136–145)
TOTAL PROTEIN: 6.2 G/DL (ref 6.4–8.2)
WBC # BLD: 9.8 K/UL (ref 4–11)

## 2022-07-12 PROCEDURE — 96366 THER/PROPH/DIAG IV INF ADDON: CPT

## 2022-07-12 PROCEDURE — G0378 HOSPITAL OBSERVATION PER HR: HCPCS

## 2022-07-12 PROCEDURE — 36415 COLL VENOUS BLD VENIPUNCTURE: CPT

## 2022-07-12 PROCEDURE — 2580000003 HC RX 258: Performed by: INTERNAL MEDICINE

## 2022-07-12 PROCEDURE — 80053 COMPREHEN METABOLIC PANEL: CPT

## 2022-07-12 PROCEDURE — 85025 COMPLETE CBC W/AUTO DIFF WBC: CPT

## 2022-07-12 PROCEDURE — 96372 THER/PROPH/DIAG INJ SC/IM: CPT

## 2022-07-12 PROCEDURE — 99233 SBSQ HOSP IP/OBS HIGH 50: CPT | Performed by: INTERNAL MEDICINE

## 2022-07-12 PROCEDURE — 2580000003 HC RX 258: Performed by: NURSE PRACTITIONER

## 2022-07-12 PROCEDURE — 6360000002 HC RX W HCPCS: Performed by: NURSE PRACTITIONER

## 2022-07-12 PROCEDURE — 1200000000 HC SEMI PRIVATE

## 2022-07-12 PROCEDURE — 6370000000 HC RX 637 (ALT 250 FOR IP): Performed by: INTERNAL MEDICINE

## 2022-07-12 PROCEDURE — 6360000002 HC RX W HCPCS: Performed by: INTERNAL MEDICINE

## 2022-07-12 RX ORDER — POLYETHYLENE GLYCOL 3350 17 G/17G
17 POWDER, FOR SOLUTION ORAL 2 TIMES DAILY
Status: DISCONTINUED | OUTPATIENT
Start: 2022-07-12 | End: 2022-07-13

## 2022-07-12 RX ADMIN — ATORVASTATIN CALCIUM 80 MG: 40 TABLET, FILM COATED ORAL at 09:14

## 2022-07-12 RX ADMIN — LOSARTAN POTASSIUM 50 MG: 25 TABLET, FILM COATED ORAL at 14:02

## 2022-07-12 RX ADMIN — CEFTRIAXONE SODIUM 1000 MG: 1 INJECTION, POWDER, FOR SOLUTION INTRAMUSCULAR; INTRAVENOUS at 15:47

## 2022-07-12 RX ADMIN — ENOXAPARIN SODIUM 30 MG: 100 INJECTION SUBCUTANEOUS at 20:27

## 2022-07-12 RX ADMIN — ASPIRIN 81 MG: 81 TABLET, CHEWABLE ORAL at 09:14

## 2022-07-12 RX ADMIN — DONEPEZIL HYDROCHLORIDE 5 MG: 5 TABLET, FILM COATED ORAL at 20:27

## 2022-07-12 RX ADMIN — Medication 10 ML: at 09:14

## 2022-07-12 RX ADMIN — SODIUM CHLORIDE 250 ML: 9 INJECTION, SOLUTION INTRAVENOUS at 15:46

## 2022-07-12 RX ADMIN — METOPROLOL TARTRATE 50 MG: 50 TABLET, FILM COATED ORAL at 20:27

## 2022-07-12 RX ADMIN — Medication 10 ML: at 20:28

## 2022-07-12 RX ADMIN — METOPROLOL TARTRATE 50 MG: 50 TABLET, FILM COATED ORAL at 09:14

## 2022-07-12 RX ADMIN — DOCUSATE SODIUM 200 MG: 100 CAPSULE, LIQUID FILLED ORAL at 09:14

## 2022-07-12 NOTE — PROGRESS NOTES
Admission assessment complete. See doc flow. Nightly medications given see MAR. Patient A/Ox4. Patient's skin intact w/scattered bruising. Patient is NPO @midnight. Patient c/o pain 7/10 to head, Tylenol given per request. IVF infusing at this time. Bed in lowest position, alarm on and locked. Call light and bedside table within easy reach.

## 2022-07-12 NOTE — ACP (ADVANCE CARE PLANNING)
Advance Care Planning     General Advance Care Planning (ACP) Conversation    Date of Conversation: 7/11/2022  Conducted with: Patient with Decision Making Capacity    Healthcare Decision Maker:    Primary Decision Maker: Yuri Barajas - [phone]  Click here to complete Healthcare Decision Makers including selection of the Healthcare Decision Maker Relationship (ie \"Primary\"). Today we documented Decision Maker(s) consistent with Legal Next of Kin hierarchy. Content/Action Overview:    Discussed Full Code Status with pt and what this means. Pt stated she needs to think about this further prior to making her decision. She is aware until she notify staff on if she wants to change her code status, it will remain full code at this time and she stated understanding. RN made aware and writer requested her to follow up later with pt about code status which she stated acknowledgment.      Length of Voluntary ACP Conversation in minutes:  <16 minutes (Non-Billable)    Gardiner Simmonds MSW, LAUREN

## 2022-07-12 NOTE — PLAN OF CARE
Problem: Discharge Planning  Goal: Discharge to home or other facility with appropriate resources  Outcome: Progressing  Flowsheets  Taken 7/11/2022 2314  Discharge to home or other facility with appropriate resources: Identify barriers to discharge with patient and caregiver  Taken 7/11/2022 2313  Discharge to home or other facility with appropriate resources: Identify barriers to discharge with patient and caregiver     Problem: Safety - Adult  Goal: Free from fall injury  Outcome: Progressing     Problem: ABCDS Injury Assessment  Goal: Absence of physical injury  Outcome: Progressing

## 2022-07-12 NOTE — PROGRESS NOTES
4 Eyes Skin Assessment     The patient is being assess for   Admission    I agree that 2 RN's have performed a thorough Head to Toe Skin Assessment on the patient. ALL assessment sites listed below have been assessed. Areas assessed for pressure by both nurses:   [x]   Head, Face, and Ears   [x]   Shoulders, Back, and Chest, Abdomen  [x]   Arms, Elbows, and Hands   [x]   Coccyx, Sacrum, and Ischium  [x]   Legs, Feet, and Heels     Patient's skin intact w/scattered bruising noted. Skin Assessed Under all Medical Devices by both nurses:  N/A              All Mepilex Borders were peeled back and area peeked at by both nurses:  No: N/A  Please list where Mepilex Borders are located:  N/A             **SHARE this note so that the co-signing nurse is able to place an eSignature**    Co-signer eSignature: Electronically signed by Chuy Mcgowan RN on 7/11/22 at 11:52 PM EDT    Does the Patient have Skin Breakdown related to pressure?   No     (Insert Photo here N/A)         Ar Prevention initiated:  No   Wound Care Orders initiated:  No      WOC nurse consulted for Pressure Injury (Stage 3,4, Unstageable, DTI, NWPT, Complex wounds)and New or Established Ostomies:  No      Primary Nurse eSignature: Electronically signed by Gregor Maya RN on 7/11/22 at 11:48 PM EDT

## 2022-07-12 NOTE — H&P
Hospital Medicine History & Physical      PCP: AGAPITO Velazquez CNP    Date of Admission: 7/11/2022    Date of Service: Pt seen/examined on 7/11/2022    Pt seen/examined face to face on and admitted as inpatient with expected LOS greater than two midnights due to medical therapy  Chief Complaint:    Chief Complaint   Patient presents with    Hematuria     reports the past few days has noticed some blood after urination when wiping        History Of Present Illness:      81 y.o. female who presented to [facility] with past medical history hypertension, hyperlipidemia, CVA presented to the ED with chief complaint of left flank pain and occurring in the right side no known relieving exacerbating factor. Patient reports that she has been having back surgery and has been wiping back to front in addition does have some incontinence timing occurring the past few weeks where she tries her hardest to get to the bathroom but sometimes incontinence gets the best of her. Patient has been wearing diapers to help. Otherwise no other exacerbating factor no associated fever chills cough phlegm production abdominal pain or dysuria.   Patient reported compliance with all her medications      Past Medical History:          Diagnosis Date    CVA (cerebral vascular accident) ([facility] [address]     Hyperlipidemia     Hypertension     Lyme disease        Past Surgical History:          Procedure Laterality Date    APPENDECTOMY      BACK SURGERY      HIP SURGERY Left 12/17/2021    LEFT HIP HEMIARTHROPLASTY performed by Ashley Martin MD at [place] [address] ARTHROSCOPY Right 2/26/2020    RIGHT SHOULDER ARTHROSCOPY WITH DEBRIDEMENT, ROTATOR CUFF REPAIR WITH REGENETEN PATCH, SUBACROMIAL DECOMPRESSION, BICEPS TENODESIS, EXCISION OF MASS  -BLOCK performed by Yolanda King MD at [address] [place] [address] Right 02/26/2020    debridement, rotator cuff repair, Regeneten patch        Medications Prior to Admission: Prior to Admission medications    Medication Sig Start Date End Date Taking? Authorizing Provider   naproxen (NAPROSYN) 500 MG tablet Take 1 tablet by mouth 2 times daily (with meals) 5/24/22 6/23/22  Kike Mishra MD   donepezil (ARICEPT) 5 MG tablet Take 5 mg by mouth daily At night 7/27/21   Historical Provider, MD   ezetimibe (ZETIA) 10 MG tablet Take 10 mg by mouth daily    Historical Provider, MD   losartan (COZAAR) 25 MG tablet Take 25 mg by mouth daily    Historical Provider, MD   fluticasone-salmeterol (ADVAIR) 500-50 MCG/DOSE diskus inhaler Inhale 1 puff into the lungs every 12 hours    Historical Provider, MD   raNITIdine (ZANTAC) 150 MG tablet Take 1 tablet by mouth 2 times daily for 5 days 6/25/20 6/30/20  AGAPITO yLnn CNP   ondansetron (ZOFRAN) 4 MG tablet Take 1 tablet by mouth every 4 hours as needed for Nausea or Vomiting 2/26/20   Shayna Fernandez MD   Probiotic Product (PROBIOTIC ADVANCED PO) Take by mouth    Historical Provider, MD   Coenzyme Q10 (COQ10 PO) Take by mouth    Historical Provider, MD   aspirin 81 MG tablet Take 81 mg by mouth daily    Historical Provider, MD   atorvastatin (LIPITOR) 40 MG tablet Take 80 mg by mouth daily     Historical Provider, MD   VENLAFAXINE HCL PO Take 150 mg by mouth daily     Historical Provider, MD   Multiple Vitamins-Minerals (THERAPEUTIC MULTIVITAMIN-MINERALS) tablet Take 1 tablet by mouth daily    Historical Provider, MD   METOPROLOL TARTRATE PO Take 50 mg by mouth 2 times daily     Historical Provider, MD       Allergies:  Carvedilol, Penicillins, Sulfa antibiotics, and Percocet [oxycodone-acetaminophen]    Social History:          TOBACCO:   reports that she has quit smoking. Her smoking use included cigarettes. She smoked 1.50 packs per day. She has never used smokeless tobacco.  ETOH:   reports previous alcohol use.   E-cigarette/Vaping     Questions Responses    E-cigarette/Vaping Use     Start Date     Passive Exposure     Quit Date Counseling Given     Comments             Family History:      Family History reviewed with patient, and positive for CVA  No family history on file. REVIEW OF SYSTEMS:     Constitutional:  No Fever, No Chills, No Night Sweats  ENT/Mouth:  No Nasal Congestion,  No Hoarseness, No new mouth lesion  Eyes:  No Eye Pain, No Redness, No Discharge  Cardiovascular:  No Chest Pain, No Orthopnea, No Palpitations  Respiratory:  No Cough, No Sputum, No Dyspnea  Gastrointestinal: No Vomiting, No Diarrhea, No abdominal pain  Genitourinary: No Urinary Frequency, No Hematuria, + urinary pain  Musculoskeletal:  No worsening Arthralgias, No worsening Myalgias  Skin:  No new Skin Lesions, No new skin rash  Neuro:  No new weakness, No new numbness. Psych:  No suicial ideation, No Violence ideation    PHYSICAL EXAM PERFORMED:    BP (!) 116/97   Pulse 66   Temp 99 °F (37.2 °C) (Oral)   Resp 16   Ht 5' 1\" (1.549 m)   Wt 110 lb (49.9 kg)   SpO2 94%   BMI 20.78 kg/m²     General appearance:  mild acute distress, appears older than stated age  HEENT:   atraumatic, sclera anicteric, Conjunctivae clear. Neck: Supple,Trachea midline, no goiter  Respiratory:minimal accessory muscle usage, Normal respiratory effort. Clear to auscultation, bilaterally without wheezing  Cardiovascular:  Regular rate and rhythm, capillary refill 2 seconds  Abdomen: Soft, non-tender, non-distended with normal bowel sounds. CVA tenderness present  Musculoskeletal:  No clubbing, cyanosis. trace edema LE bilaterally. Skin: turgor normal.  No new rashes or lesions. Neurologic: Alert and oriented x4, no new focal sensory/motor deficits.      Labs:     Recent Labs     07/11/22  1436   WBC 10.0   HGB 12.9   HCT 38.2        Recent Labs     07/11/22  1436      K 4.0      CO2 27   BUN 25*   CREATININE 0.9   CALCIUM 9.3     Recent Labs     07/11/22  1436   AST 29   ALT 17   BILITOT <0.2   ALKPHOS 156*     No results for input(s): INR in the last 72 hours. No results for input(s): Tonja Mariee in the last 72 hours. Urinalysis:      Lab Results   Component Value Date/Time    NITRU POSITIVE 07/11/2022 03:22 PM    WBCUA  07/11/2022 02:36 PM    BACTERIA 3+ 07/11/2022 02:36 PM    RBCUA >100 07/11/2022 02:36 PM    BLOODU LARGE 07/11/2022 03:22 PM    SPECGRAV 1.020 07/11/2022 03:22 PM    GLUCOSEU Negative 07/11/2022 03:22 PM       Radiology:       CT ABDOMEN PELVIS W IV CONTRAST Additional Contrast? None   Final Result   Abnormal wall thickening identified of the bladder to suggest cystitis with   correlation to urinalysis is recommended. There is abnormal enhancement of   the lower pole the right kidney with surrounding stranding to suggest a   pyelonephritis. Stool burden increased throughout the colon to suggest clinical presentation   of constipation with short segment of wall thickening of the sigmoid colon to   suggest possible mild inflammation and a colitis. 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  Outpatient PCP follow up post-discharge  Dementia: Continue Aricept    Diet: NPO except meds ordered    DVT Prophylaxis: lovenox    Dispo:   Expected LOS greater than two [employer], DO

## 2022-07-12 NOTE — PROGRESS NOTES
Nurse spoke with pt re: code status at request of SW. Pt reports that she is a DNR but unsure if DNR CC or not. Pt requesting to leave code status as stated in chart until daughter brings in her paperwork.

## 2022-07-12 NOTE — PROGRESS NOTES
Hospitalist Progress Note      PCP: Michael Cook, APRN - CNP    Date of Admission: 7/11/2022    Chief Complaint: hematuria and lower abd discomfort. Subjective: hematuria resolved. Feels better today. Asking about diet. Medications:  Reviewed    Infusion Medications    sodium chloride       Scheduled Medications    cefTRIAXone (ROCEPHIN) IV  1,000 mg IntraVENous Q24H    sodium chloride flush  10 mL IntraVENous 2 times per day    enoxaparin  30 mg SubCUTAneous Nightly    aspirin  81 mg Oral Daily    donepezil  5 mg Oral Nightly    atorvastatin  80 mg Oral Daily    losartan  25 mg Oral Daily    metoprolol tartrate  50 mg Oral BID    docusate sodium  200 mg Oral BID     PRN Meds: sodium chloride flush, sodium chloride, potassium chloride, magnesium sulfate, promethazine **OR** ondansetron, acetaminophen **OR** acetaminophen      Intake/Output Summary (Last 24 hours) at 7/12/2022 1231  Last data filed at 7/11/2022 1816  Gross per 24 hour   Intake 50 ml   Output --   Net 50 ml       Physical Exam Performed:    BP (!) 149/74   Pulse 68   Temp 97.4 °F (36.3 °C) (Oral)   Resp 16   Ht 5' 1\" (1.549 m)   Wt 110 lb 14.4 oz (50.3 kg)   SpO2 93%   BMI 20.95 kg/m²     General appearance: No apparent distress, appears stated age and cooperative. HEENT: Pupils equal, round, and reactive to light. Conjunctivae/corneas clear. Neck: Supple, with full range of motion. No jugular venous distention. Trachea midline. Respiratory:  Normal respiratory effort. Clear to auscultation, bilaterally without Rales/Wheezes/Rhonchi. Cardiovascular: Regular rate and rhythm with normal S1/S2 without murmurs, rubs or gallops. Abdomen: Soft, non-tender, non-distended with normal bowel sounds. Musculoskeletal: No clubbing, cyanosis or edema bilaterally. Full range of motion without deformity. Skin: Skin color, texture, turgor normal.  No rashes or lesions.   Neurologic:  Neurovascularly intact without any focal sensory/motor deficits. Cranial nerves: II-XII intact, grossly non-focal.  Psychiatric: Alert and oriented, thought content appropriate, normal insight  Capillary Refill: Brisk,3 seconds, normal   Peripheral Pulses: +2 palpable, equal bilaterally       Labs:   Recent Labs     07/11/22  1436 07/12/22  0536   WBC 10.0 9.8   HGB 12.9 12.3   HCT 38.2 37.2    219     Recent Labs     07/11/22  1436 07/12/22  0536    141   K 4.0 3.6    103   CO2 27 25   BUN 25* 23*   CREATININE 0.9 1.1   CALCIUM 9.3 8.9     Recent Labs     07/11/22  1436 07/12/22  0536   AST 29 24   ALT 17 13   BILITOT <0.2 <0.2   ALKPHOS 156* 137*     No results for input(s): INR in the last 72 hours. No results for input(s): Reina Comber in the last 72 hours. Urinalysis:      Lab Results   Component Value Date/Time    NITRU POSITIVE 07/11/2022 03:22 PM    WBCUA  07/11/2022 02:36 PM    BACTERIA 3+ 07/11/2022 02:36 PM    RBCUA >100 07/11/2022 02:36 PM    BLOODU LARGE 07/11/2022 03:22 PM    SPECGRAV 1.020 07/11/2022 03:22 PM    GLUCOSEU Negative 07/11/2022 03:22 PM       Radiology:  CT ABDOMEN PELVIS W IV CONTRAST Additional Contrast? None   Final Result   Abnormal wall thickening identified of the bladder to suggest cystitis with   correlation to urinalysis is recommended. There is abnormal enhancement of   the lower pole the right kidney with surrounding stranding to suggest a   pyelonephritis. Stool burden increased throughout the colon to suggest clinical presentation   of constipation with short segment of wall thickening of the sigmoid colon to   suggest possible mild inflammation and a colitis. 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                  Assessment/Plan:    Active Hospital Problems    Diagnosis     Pyelonephritis [N12]      Priority: Medium       Acute Cystitis with hematuria and R renal pyelonephritis noted on CT. IV rocephin pending culture. Hematuria resolved. Constipation. Add colace and BID miralax. PVD  Hx of bilateral Fem bypass remote and more recently had balloon angioplasty done at [facility].   Cont statin and ASA. Has known saccular infrarenal aortic aneurysm followed closely by vascular surgery at [facility] - stable - no indication for emergent consultation at this time. HTN - cont PTA regimen. DVT Prophylaxis: lovenox  Diet: ADULT DIET; Regular  Code Status: Full Code        Dispo - pending cultures, anticipate d/c home in the next 24-48hrs.      Nate Champagne MD

## 2022-07-12 NOTE — FLOWSHEET NOTE
07/12/22 0906   Vital Signs   Temp 97.4 °F (36.3 °C)   Temp Source Oral   Heart Rate 68   Heart Rate Source Monitor   Resp 16   BP (!) 149/74   BP Location Right upper arm   BP Method Automatic   MAP (Calculated) 99   Patient Position Semi fowlers   Level of Consciousness Alert (0)   MEWS Score 1   Oxygen Therapy   SpO2 93 %   O2 Device None (Room air)   Pt resting in bed at this time, alert and oriented x 4. Assessment completed. VS obtained. Pt tolerating po diet well at this time. No s/s of distress noted. Call light within reach.

## 2022-07-13 VITALS
BODY MASS INDEX: 21.34 KG/M2 | HEART RATE: 66 BPM | DIASTOLIC BLOOD PRESSURE: 72 MMHG | SYSTOLIC BLOOD PRESSURE: 134 MMHG | TEMPERATURE: 98.7 F | WEIGHT: 113 LBS | OXYGEN SATURATION: 93 % | RESPIRATION RATE: 16 BRPM | HEIGHT: 61 IN

## 2022-07-13 LAB
A/G RATIO: 0.9 (ref 1.1–2.2)
ALBUMIN SERPL-MCNC: 3.1 G/DL (ref 3.4–5)
ALP BLD-CCNC: 142 U/L (ref 40–129)
ALT SERPL-CCNC: 13 U/L (ref 10–40)
ANION GAP SERPL CALCULATED.3IONS-SCNC: 9 MMOL/L (ref 3–16)
AST SERPL-CCNC: 24 U/L (ref 15–37)
BASOPHILS ABSOLUTE: 0.1 K/UL (ref 0–0.2)
BASOPHILS RELATIVE PERCENT: 0.8 %
BILIRUB SERPL-MCNC: <0.2 MG/DL (ref 0–1)
BUN BLDV-MCNC: 16 MG/DL (ref 7–20)
CALCIUM SERPL-MCNC: 9.3 MG/DL (ref 8.3–10.6)
CHLORIDE BLD-SCNC: 102 MMOL/L (ref 99–110)
CO2: 29 MMOL/L (ref 21–32)
CREAT SERPL-MCNC: 0.7 MG/DL (ref 0.6–1.2)
EOSINOPHILS ABSOLUTE: 0.3 K/UL (ref 0–0.6)
EOSINOPHILS RELATIVE PERCENT: 3.3 %
GFR AFRICAN AMERICAN: >60
GFR NON-AFRICAN AMERICAN: >60
GLUCOSE BLD-MCNC: 106 MG/DL (ref 70–99)
HCT VFR BLD CALC: 38.8 % (ref 36–48)
HEMOGLOBIN: 12.8 G/DL (ref 12–16)
LYMPHOCYTES ABSOLUTE: 2.1 K/UL (ref 1–5.1)
LYMPHOCYTES RELATIVE PERCENT: 23.3 %
MCH RBC QN AUTO: 29.6 PG (ref 26–34)
MCHC RBC AUTO-ENTMCNC: 33.1 G/DL (ref 31–36)
MCV RBC AUTO: 89.5 FL (ref 80–100)
MONOCYTES ABSOLUTE: 0.8 K/UL (ref 0–1.3)
MONOCYTES RELATIVE PERCENT: 9.3 %
NEUTROPHILS ABSOLUTE: 5.7 K/UL (ref 1.7–7.7)
NEUTROPHILS RELATIVE PERCENT: 63.3 %
ORGANISM: ABNORMAL
PDW BLD-RTO: 13.7 % (ref 12.4–15.4)
PLATELET # BLD: 215 K/UL (ref 135–450)
PMV BLD AUTO: 7.6 FL (ref 5–10.5)
POTASSIUM REFLEX MAGNESIUM: 3.8 MMOL/L (ref 3.5–5.1)
RBC # BLD: 4.33 M/UL (ref 4–5.2)
SODIUM BLD-SCNC: 140 MMOL/L (ref 136–145)
TOTAL PROTEIN: 6.4 G/DL (ref 6.4–8.2)
URINE CULTURE, ROUTINE: ABNORMAL
WBC # BLD: 9 K/UL (ref 4–11)

## 2022-07-13 PROCEDURE — 6370000000 HC RX 637 (ALT 250 FOR IP): Performed by: INTERNAL MEDICINE

## 2022-07-13 PROCEDURE — G0378 HOSPITAL OBSERVATION PER HR: HCPCS

## 2022-07-13 PROCEDURE — 2580000003 HC RX 258: Performed by: INTERNAL MEDICINE

## 2022-07-13 PROCEDURE — 99239 HOSP IP/OBS DSCHRG MGMT >30: CPT | Performed by: INTERNAL MEDICINE

## 2022-07-13 PROCEDURE — 80053 COMPREHEN METABOLIC PANEL: CPT

## 2022-07-13 PROCEDURE — 36415 COLL VENOUS BLD VENIPUNCTURE: CPT

## 2022-07-13 PROCEDURE — 85025 COMPLETE CBC W/AUTO DIFF WBC: CPT

## 2022-07-13 RX ORDER — CIPROFLOXACIN 500 MG/1
500 TABLET, FILM COATED ORAL 2 TIMES DAILY
Qty: 20 TABLET | Refills: 0 | Status: SHIPPED | OUTPATIENT
Start: 2022-07-13 | End: 2022-07-13 | Stop reason: SDUPTHER

## 2022-07-13 RX ORDER — ENOXAPARIN SODIUM 100 MG/ML
40 INJECTION SUBCUTANEOUS NIGHTLY
Status: DISCONTINUED | OUTPATIENT
Start: 2022-07-13 | End: 2022-07-13 | Stop reason: HOSPADM

## 2022-07-13 RX ORDER — POLYETHYLENE GLYCOL 3350 17 G/17G
17 POWDER, FOR SOLUTION ORAL DAILY PRN
Status: DISCONTINUED | OUTPATIENT
Start: 2022-07-13 | End: 2022-07-13 | Stop reason: HOSPADM

## 2022-07-13 RX ORDER — CIPROFLOXACIN 500 MG/1
500 TABLET, FILM COATED ORAL 2 TIMES DAILY
Qty: 20 TABLET | Refills: 0 | Status: SHIPPED | OUTPATIENT
Start: 2022-07-13 | End: 2022-07-23

## 2022-07-13 RX ORDER — LOSARTAN POTASSIUM AND HYDROCHLOROTHIAZIDE 25; 100 MG/1; MG/1
0.5 TABLET ORAL DAILY
Qty: 30 TABLET | Refills: 3 | Status: SHIPPED | OUTPATIENT
Start: 2022-07-13 | End: 2022-07-13 | Stop reason: SDUPTHER

## 2022-07-13 RX ORDER — AMLODIPINE BESYLATE 5 MG/1
10 TABLET ORAL DAILY
Status: DISCONTINUED | OUTPATIENT
Start: 2022-07-13 | End: 2022-07-13 | Stop reason: HOSPADM

## 2022-07-13 RX ORDER — DOCUSATE SODIUM 100 MG/1
100 CAPSULE, LIQUID FILLED ORAL DAILY
Status: DISCONTINUED | OUTPATIENT
Start: 2022-07-13 | End: 2022-07-13 | Stop reason: HOSPADM

## 2022-07-13 RX ORDER — LOSARTAN POTASSIUM AND HYDROCHLOROTHIAZIDE 25; 100 MG/1; MG/1
0.5 TABLET ORAL DAILY
Qty: 30 TABLET | Refills: 3 | Status: SHIPPED | OUTPATIENT
Start: 2022-07-13

## 2022-07-13 RX ADMIN — Medication 10 ML: at 10:21

## 2022-07-13 RX ADMIN — LOSARTAN POTASSIUM 50 MG: 25 TABLET, FILM COATED ORAL at 10:21

## 2022-07-13 RX ADMIN — METOPROLOL TARTRATE 50 MG: 50 TABLET, FILM COATED ORAL at 10:21

## 2022-07-13 RX ADMIN — ATORVASTATIN CALCIUM 80 MG: 40 TABLET, FILM COATED ORAL at 10:21

## 2022-07-13 RX ADMIN — AMLODIPINE BESYLATE 10 MG: 5 TABLET ORAL at 10:20

## 2022-07-13 RX ADMIN — ASPIRIN 81 MG: 81 TABLET, CHEWABLE ORAL at 10:21

## 2022-07-13 NOTE — PROGRESS NOTES
Shift assessment complete. See doc flow. Nightly medications given see MAR. Patient A/Ox4. Patient's skin intact w/scattered bruising. Patient with no complaints at this time. Bed in lowest position, alarm on and locked. Call light and bedside table within easy reach.

## 2022-07-13 NOTE — PROGRESS NOTES
Patient given discharge instructions including medications and follow-up information. Patient verbalizes understanding and signed discharge paperwork. IV removed without difficulty. Patient tolerated well. Patient's family member escorting patient off of unit via wheelchair.

## 2022-07-13 NOTE — CARE COORDINATION
DISCHARGE ORDER  Date/Time 2022 2:37 PM  Completed by: Leila Dior RN, Case Management    Patient Name: Brigido Boyle      : 1940  Admitting Diagnosis: Pyelonephritis [N12]  Acute pyelonephritis [N10]  Constipation, unspecified constipation type [K59.00]  Abdominal aortic aneurysm (AAA) without rupture ([facility] [address] [I71.4]      Admit order Date and Status: 22 inpt  (verify MD's last order for status of admission)      Noted discharge order. If applicable PT/OT recommendation at Discharge: N/A  DME recommendation by PT/OT: N/A  Confirmed discharge plan  : Yes  with whom patient  If pt confirmed DC plan does family need to be contacted by CM No   Discharge Plan: Order for dc noted Spoke with pt who cont plan to return home. Discussed HHC and pt declines. Chart reviewed and no other dc needs identified. Date of Last IMM Given: 22 20:16    Reviewed chart. Role of discharge planner explained and patient verbalized understanding. Discharge order is noted. Has Home O2 in place on admit:  No  Informed of need to bring portable home O2 tank on day of discharge for nursing to connect prior to leaving:   Not Indicated  Verbalized agreement/Understanding:   Not Indicated  Pt is being d/c'd to home today. Pt's O2 sats are 93% on RA. Discharge timeout done with  Pt,nsg,CM. All discharge needs and concerns addressed.
review completed. Met with pt at bedside. Pt stated she is independent at home and will return when discharged. Discussed skilled [facility] and pt stated she won't need this. She denied all needs for CM. CM will follow at a distance. Please notify CM if needs or concerns arise.      Cele Hardy MSW, CHELS

## 2022-07-13 NOTE — PLAN OF CARE
Problem: Discharge Planning  Goal: Discharge to home or other facility with appropriate resources  7/12/2022 2038 by Mendez Ortega RN  Outcome: Progressing  Flowsheets (Taken 7/12/2022 2031)  Discharge to home or other facility with appropriate resources: Identify barriers to discharge with patient and caregiver  7/12/2022 1329 by Kiesha Elder RN  Outcome: Progressing     Problem: Safety - Adult  Goal: Free from fall injury  7/12/2022 2038 by Mendez Ortega RN  Outcome: Progressing  Flowsheets (Taken 7/12/2022 1343 by Kiesha Elder RN)  Free From Fall Injury: Instruct family/caregiver on patient safety  7/12/2022 1329 by Kiesha Elder RN  Outcome: Progressing     Problem: ABCDS Injury Assessment  Goal: Absence of physical injury  7/12/2022 2038 by Mendez Ortega RN  Outcome: Progressing  7/12/2022 1329 by Kiesha Elder RN  Outcome: Progressing     Problem: Chronic Conditions and Co-morbidities  Goal: Patient's chronic conditions and co-morbidity symptoms are monitored and maintained or improved  7/12/2022 2038 by Mendez Ortega RN  Outcome: Progressing  Flowsheets (Taken 7/12/2022 2031)  Care Plan - Patient's Chronic Conditions and Co-Morbidity Symptoms are Monitored and Maintained or Improved: Monitor and assess patient's chronic conditions and comorbid symptoms for stability, deterioration, or improvement  7/12/2022 1329 by Kiesha Elder RN  Outcome: Progressing

## 2022-07-13 NOTE — DISCHARGE INSTR - COC
(cerebrovascular accident) Z86.73    Fall from slip, trip, or stumble, initial encounter W01. 0XXA    Elbow injury, left, initial encounter S59.902A    Left hip pain M25.552    Contusion of left hip S70. 02XA    Acute pyelonephritis N10       Isolation/Infection:   Isolation            No Isolation          Patient Infection Status       Infection Onset Added Last Indicated Last Indicated By Review Planned Expiration Resolved Resolved By    None active    Resolved    COVID-19 (Rule Out) 07/11/22 07/11/22 07/11/22 COVID-19 & Influenza Combo (Ordered)   07/11/22 Rule-Out Test Resulted            Nurse Assessment:  Last Vital Signs: /72   Pulse 66   Temp 98.7 °F (37.1 °C) (Oral)   Resp 16   Ht 5' 1\" (1.549 m)   Wt 113 lb (51.3 kg)   SpO2 93%   BMI 21.35 kg/m²     Last documented pain score (0-10 scale): Pain Level: 7  Last Weight:   Wt Readings from Last 1 Encounters:   07/13/22 113 lb (51.3 kg)     Mental Status:  {IP PT MENTAL STATUS:20030}    IV Access:  { TONEY IV ACCESS:733816730}    Nursing Mobility/ADLs:  Walking   {[facility] DME CHAF:085146902}  Transfer  {[facility] DME BAUE:782343516}  Bathing  {[facility] DME DADO:457433251}  Dressing  {P DME JVLB:144026516}  Toileting  {[facility] DME BEGC:016299125}  Feeding  {[facility] DME SPZM:736599332}  Med Admin  {[facility] DME HUYA:200684490}  Med Delivery   { TONEY MED Delivery:834422439}    Wound Care Documentation and Therapy:        Elimination:  Continence: Bowel: {YES / CJ:02519}  Bladder: {YES / XR:78610}  Urinary Catheter: {Urinary Catheter:583791026}   Colostomy/Ileostomy/Ileal Conduit: {YES / BB:60150}       Date of Last BM: ***    Intake/Output Summary (Last 24 hours) at 7/13/2022 1317  Last data filed at 7/13/2022 0615  Gross per 24 hour   Intake 100 ml   Output 850 ml   Net -750 ml     I/O last 3 completed shifts:   In: 100 [P.O.:100]  Out: 850 [Urine:850]    Safety Concerns:     [address] Safety Concerns:017868435}    Impairments/Disabilities:      [address] Impairments/Disabilities:242246377}    Nutrition Therapy:  Current Nutrition Therapy:   [address] TONEY Diet List:806868936}    Routes of Feeding: {[facility] DME Other Feedings:791715916}  Liquids: {Slp liquid thickness:86168}  Daily Fluid Restriction: {CHP DME Yes amt example:682105144}  Last Modified Barium Swallow with Video (Video Swallowing Test): {Done Not Done XDJD:621089103}    Treatments at the Time of Hospital Discharge:   Respiratory Treatments: ***  Oxygen Therapy:  {Therapy; copd oxygen:13685}  Ventilator:    {MH CC Vent GMQA:050063993}    Rehab Therapies: {THERAPEUTIC INTERVENTION:3855644705}  Weight Bearing Status/Restrictions: 50Kayleen JEREZ Weight Bearin}  Other Medical Equipment (for information only, NOT a DME order):  {EQUIPMENT:145404522}  Other Treatments: ***    Patient's personal belongings (please select all that are sent with patient):  {[facility] DME Belongings:440197916}    RN SIGNATURE:  {Esignature:855005660}    CASE MANAGEMENT/SOCIAL WORK SECTION    Inpatient Status Date: ***    Readmission Risk Assessment Score:  Readmission Risk              Risk of Unplanned Readmission:  11           Discharging to Facility/ Agency   Name:   Address:  Phone:  Fax:    Dialysis Facility (if applicable)   Name:  Address:  Dialysis Schedule:  Phone:  Fax:    / signature: {Esignature:873369421}    PHYSICIAN SECTION    Prognosis: {Prognosis:4543550368}    Condition at Discharge: [address] Cervantes Patient Condition:881783089}    Rehab Potential (if transferring to Rehab): {Prognosis:8913876481}    Recommended Labs or Other Treatments After Discharge: ***    Physician Certification: I certify the above information and transfer of Edel Alexander  is necessary for the continuing treatment of the diagnosis listed and that she requires {Admit to Appropriate Level of Care:26890} for {GREATER/LESS:144974190} 30 days.      Update Admission H&P: {CHP DME Changes in FCGUW:271841532}    PHYSICIAN SIGNATURE:

## 2022-07-13 NOTE — PLAN OF CARE
Problem: Discharge Planning  Goal: Discharge to home or other facility with appropriate resources  Outcome: Progressing     Problem: Safety - Adult  Goal: Free from fall injury  Outcome: Progressing     Problem: ABCDS Injury Assessment  Goal: Absence of physical injury  Outcome: Progressing     Problem: Chronic Conditions and Co-morbidities  Goal: Patient's chronic conditions and co-morbidity symptoms are monitored and maintained or improved  Outcome: Progressing     Problem: Skin/Tissue Integrity  Goal: Absence of new skin breakdown  Description: 1. Monitor for areas of redness and/or skin breakdown  2. Assess vascular access sites hourly  3. Every 4-6 hours minimum:  Change oxygen saturation probe site  4. Every 4-6 hours:  If on nasal continuous positive airway pressure, respiratory therapy assess nares and determine need for appliance change or resting period.   Outcome: Progressing

## 2022-07-13 NOTE — FLOWSHEET NOTE
AM assessment completed. See flowsheet. A/Ox4. LCTAB. Medications taken without difficulty. BS active x4. No c/o n/v/d. Cont of B&B. No edema noted. No c/o pain/discomfort at this time. Bed locked and in low position. Call light in reach.      07/13/22 1015   Vital Signs   Temp 98.7 °F (37.1 °C)   Temp Source Oral   Heart Rate 66   Heart Rate Source Monitor   Resp 16   /72   BP Location Right upper arm   BP Method Automatic   MAP (Calculated) 92.67   Patient Position Semi fowlers   Level of Consciousness Alert (0)   MEWS Score 1   Pain Assessment   Pain Assessment None - Denies Pain   Oxygen Therapy   SpO2 93 %   O2 Device None (Room air)

## 2022-07-13 NOTE — DISCHARGE SUMMARY
Hospital Medicine Discharge Summary    Patient ID: Susu Sheth      Patient's PCP: Shakila Carter, APRN - CNP    Admit Date: 7/11/2022     Discharge Date:   7/13/2022    Admitting Provider: Ruddy Leonard DO     Discharge Provider: Hal Colby MD     Discharge Diagnoses: Active Hospital Problems    Diagnosis     Acute pyelonephritis [N10]      Priority: Medium       The patient was seen and examined on day of discharge and this discharge summary is in conjunction with any daily progress note from day of discharge. Hospital Course: 82yo woman with Hx of known advanced PVD s/p prior bilateral fem bypass and also known abd aortic saccular aneurysm followed closely at [facility], presented with flank pain and hematuria.           Acute Cystitis with hematuria and R renal pyelonephritis noted on CT. IV rocephin pending culture. Hematuria resolved. - culture ecoli - PCN resistant. - will treat with cipro PO BID for full 10 days on discharge.         Constipation - resolved  Add colace and BID miralax.     - colace decreased to daily and miralax to PRN.    PVD  Hx of bilateral Fem bypass remote and more recently had balloon angioplasty done at [facility].   Cont statin and ASA. Has known saccular infrarenal aortic aneurysm followed closely by vascular surgery at [facility] - stable - no indication for emergent consultation at this time.         HTN - cont PTA regimen.             Physical Exam Performed:     /72   Pulse 66   Temp 98.7 °F (37.1 °C) (Oral)   Resp 16   Ht 5' 1\" (1.549 m)   Wt 113 lb (51.3 kg)   SpO2 93%   BMI 21.35 kg/m²     General appearance: No apparent distress, appears stated age and cooperative. HEENT: Pupils equal, round, and reactive to light. Conjunctivae/corneas clear. Neck: Supple, with full range of motion. No jugular venous distention. Trachea midline. Respiratory:  Normal respiratory effort.  Clear to auscultation, bilaterally without recommended. 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 Consults:     IP CONSULT TO HOSPITALIST    Disposition:  home     Condition at Discharge: Stable    Discharge Instructions/Follow-up:  PCP 1 week.      Code Status:  Full Code     Activity: activity as tolerated    Diet: regular diet      Discharge Medications:     Current Discharge Medication List           Details   ciprofloxacin (CIPRO) 500 MG tablet Take 1 tablet by mouth 2 times daily for 10 days  Qty: 20 tablet, Refills: 0              Details   losartan-hydroCHLOROthiazide (HYZAAR) 100-25 MG per tablet Take 0.5 tablets by mouth daily  Qty: 30 tablet, Refills: 3              Details   amLODIPine (NORVASC) 10 MG tablet Take 10 mg by mouth daily Takes in evening      busPIRone (BUSPAR) 5 MG tablet Take 5 mg by mouth 2 times daily      Cholecalciferol (VITAMIN D) 10 MCG (400 UNIT) CAPS Capsule Take 400 Units by mouth daily      hydrOXYzine HCl (ATARAX) 25 MG tablet Take 25 mg by mouth 2 times daily as needed for Itching      pantoprazole (PROTONIX) 40 MG tablet Take 40 mg by mouth daily      Albuterol Sulfate (PROAIR HFA IN) Inhale 90 mcg into the lungs 2 times daily      b complex vitamins capsule Take 1 capsule by mouth daily      vitamin C (ASCORBIC ACID) 500 MG tablet Take 500 mg by mouth daily      vitamin E 400 UNIT capsule Take 400 Units by mouth daily      naproxen (NAPROSYN) 500 MG tablet Take 1 tablet by mouth 2 times daily (with meals)  Qty: 60 tablet, Refills: 0    Associated Diagnoses: Left displaced femoral neck fracture (HCC)      donepezil (ARICEPT) 5 MG tablet Take 5 mg by mouth daily At night      ezetimibe (ZETIA) 10 MG tablet Take 10 mg by mouth daily      fluticasone-salmeterol (ADVAIR) 500-50 MCG/DOSE diskus inhaler Inhale 1 puff into the lungs every 12 hours      raNITIdine (ZANTAC) 150 MG tablet Take 1 tablet by mouth 2 times daily for 5

## 2022-09-16 ENCOUNTER — APPOINTMENT (OUTPATIENT)
Dept: CT IMAGING | Age: 82
End: 2022-09-16
Payer: MEDICARE

## 2022-09-16 ENCOUNTER — HOSPITAL ENCOUNTER (EMERGENCY)
Age: 82
Discharge: HOME | End: 2022-09-17
Attending: EMERGENCY MEDICINE
Payer: MEDICARE

## 2022-09-16 DIAGNOSIS — I65.21 ATHEROSCLEROSIS OF RIGHT CAROTID ARTERY: ICD-10-CM

## 2022-09-16 DIAGNOSIS — M54.2 NECK PAIN ON RIGHT SIDE: Primary | ICD-10-CM

## 2022-09-16 DIAGNOSIS — I67.1 LEFT CAVERNOUS CAROTID ANEURYSM: ICD-10-CM

## 2022-09-16 DIAGNOSIS — M62.838 TRAPEZIUS MUSCLE SPASM: ICD-10-CM

## 2022-09-16 LAB
A/G RATIO: 0.8 (ref 1.1–2.2)
ALBUMIN SERPL-MCNC: 3.4 G/DL (ref 3.4–5)
ALP BLD-CCNC: 163 U/L (ref 40–129)
ALT SERPL-CCNC: 9 U/L (ref 10–40)
ANION GAP SERPL CALCULATED.3IONS-SCNC: 10 MMOL/L (ref 3–16)
AST SERPL-CCNC: 24 U/L (ref 15–37)
BASOPHILS ABSOLUTE: 0.1 K/UL (ref 0–0.2)
BASOPHILS RELATIVE PERCENT: 0.7 %
BILIRUB SERPL-MCNC: 0.5 MG/DL (ref 0–1)
BUN BLDV-MCNC: 24 MG/DL (ref 7–20)
CALCIUM SERPL-MCNC: 9.5 MG/DL (ref 8.3–10.6)
CHLORIDE BLD-SCNC: 98 MMOL/L (ref 99–110)
CO2: 31 MMOL/L (ref 21–32)
CREAT SERPL-MCNC: 0.9 MG/DL (ref 0.6–1.2)
EOSINOPHILS ABSOLUTE: 0.1 K/UL (ref 0–0.6)
EOSINOPHILS RELATIVE PERCENT: 1.5 %
GFR AFRICAN AMERICAN: >60
GFR NON-AFRICAN AMERICAN: >60
GLUCOSE BLD-MCNC: 102 MG/DL (ref 70–99)
HCT VFR BLD CALC: 38.8 % (ref 36–48)
HEMOGLOBIN: 13 G/DL (ref 12–16)
LYMPHOCYTES ABSOLUTE: 1.7 K/UL (ref 1–5.1)
LYMPHOCYTES RELATIVE PERCENT: 18.7 %
MCH RBC QN AUTO: 29.9 PG (ref 26–34)
MCHC RBC AUTO-ENTMCNC: 33.5 G/DL (ref 31–36)
MCV RBC AUTO: 89.3 FL (ref 80–100)
MONOCYTES ABSOLUTE: 1 K/UL (ref 0–1.3)
MONOCYTES RELATIVE PERCENT: 11 %
NEUTROPHILS ABSOLUTE: 6.3 K/UL (ref 1.7–7.7)
NEUTROPHILS RELATIVE PERCENT: 68.1 %
PDW BLD-RTO: 13.5 % (ref 12.4–15.4)
PLATELET # BLD: 226 K/UL (ref 135–450)
PMV BLD AUTO: 8.1 FL (ref 5–10.5)
POTASSIUM SERPL-SCNC: 3.8 MMOL/L (ref 3.5–5.1)
RBC # BLD: 4.34 M/UL (ref 4–5.2)
SODIUM BLD-SCNC: 139 MMOL/L (ref 136–145)
TOTAL PROTEIN: 7.5 G/DL (ref 6.4–8.2)
WBC # BLD: 9.3 K/UL (ref 4–11)

## 2022-09-16 PROCEDURE — 84484 ASSAY OF TROPONIN QUANT: CPT

## 2022-09-16 PROCEDURE — 80053 COMPREHEN METABOLIC PANEL: CPT

## 2022-09-16 PROCEDURE — 6360000004 HC RX CONTRAST MEDICATION: Performed by: EMERGENCY MEDICINE

## 2022-09-16 PROCEDURE — 6360000002 HC RX W HCPCS: Performed by: EMERGENCY MEDICINE

## 2022-09-16 PROCEDURE — 36415 COLL VENOUS BLD VENIPUNCTURE: CPT

## 2022-09-16 PROCEDURE — 6370000000 HC RX 637 (ALT 250 FOR IP): Performed by: EMERGENCY MEDICINE

## 2022-09-16 PROCEDURE — 85025 COMPLETE CBC W/AUTO DIFF WBC: CPT

## 2022-09-16 PROCEDURE — 99285 EMERGENCY DEPT VISIT HI MDM: CPT

## 2022-09-16 PROCEDURE — 70498 CT ANGIOGRAPHY NECK: CPT

## 2022-09-16 PROCEDURE — 96375 TX/PRO/DX INJ NEW DRUG ADDON: CPT

## 2022-09-16 PROCEDURE — 96374 THER/PROPH/DIAG INJ IV PUSH: CPT

## 2022-09-16 RX ORDER — ONDANSETRON 2 MG/ML
4 INJECTION INTRAMUSCULAR; INTRAVENOUS ONCE
Status: COMPLETED | OUTPATIENT
Start: 2022-09-16 | End: 2022-09-16

## 2022-09-16 RX ORDER — METHOCARBAMOL 500 MG/1
1000 TABLET, FILM COATED ORAL ONCE
Status: COMPLETED | OUTPATIENT
Start: 2022-09-16 | End: 2022-09-16

## 2022-09-16 RX ORDER — MORPHINE SULFATE 2 MG/ML
2 INJECTION, SOLUTION INTRAMUSCULAR; INTRAVENOUS ONCE
Status: COMPLETED | OUTPATIENT
Start: 2022-09-16 | End: 2022-09-16

## 2022-09-16 RX ORDER — IBUPROFEN 400 MG/1
400 TABLET ORAL ONCE
Status: COMPLETED | OUTPATIENT
Start: 2022-09-16 | End: 2022-09-16

## 2022-09-16 RX ORDER — DIAZEPAM 2 MG/1
2 TABLET ORAL ONCE
Status: COMPLETED | OUTPATIENT
Start: 2022-09-16 | End: 2022-09-16

## 2022-09-16 RX ORDER — ACETAMINOPHEN 325 MG/1
650 TABLET ORAL ONCE
Status: COMPLETED | OUTPATIENT
Start: 2022-09-16 | End: 2022-09-16

## 2022-09-16 RX ORDER — DIAZEPAM 2 MG/1
2 TABLET ORAL EVERY 8 HOURS PRN
Qty: 5 TABLET | Refills: 0 | Status: SHIPPED | OUTPATIENT
Start: 2022-09-16 | End: 2022-09-18

## 2022-09-16 RX ADMIN — METHOCARBAMOL TABLETS 1000 MG: 500 TABLET, COATED ORAL at 19:45

## 2022-09-16 RX ADMIN — ACETAMINOPHEN 650 MG: 325 TABLET ORAL at 19:45

## 2022-09-16 RX ADMIN — IBUPROFEN 400 MG: 400 TABLET, FILM COATED ORAL at 19:45

## 2022-09-16 RX ADMIN — MORPHINE SULFATE 2 MG: 2 INJECTION, SOLUTION INTRAMUSCULAR; INTRAVENOUS at 21:23

## 2022-09-16 RX ADMIN — ONDANSETRON HYDROCHLORIDE 4 MG: 2 INJECTION, SOLUTION INTRAMUSCULAR; INTRAVENOUS at 21:24

## 2022-09-16 RX ADMIN — IOPAMIDOL 75 ML: 755 INJECTION, SOLUTION INTRAVENOUS at 22:10

## 2022-09-16 RX ADMIN — DIAZEPAM 2 MG: 2 TABLET ORAL at 21:23

## 2022-09-16 ASSESSMENT — ENCOUNTER SYMPTOMS
BACK PAIN: 0
SHORTNESS OF BREATH: 0
EYE PAIN: 0
CHEST TIGHTNESS: 0
FACIAL SWELLING: 0
COLOR CHANGE: 0
SINUS PAIN: 0
ABDOMINAL PAIN: 0
TROUBLE SWALLOWING: 0
SINUS PRESSURE: 0
VOMITING: 0
SORE THROAT: 0

## 2022-09-16 ASSESSMENT — PAIN SCALES - GENERAL
PAINLEVEL_OUTOF10: 3
PAINLEVEL_OUTOF10: 8
PAINLEVEL_OUTOF10: 9
PAINLEVEL_OUTOF10: 8
PAINLEVEL_OUTOF10: 9

## 2022-09-16 ASSESSMENT — PAIN - FUNCTIONAL ASSESSMENT: PAIN_FUNCTIONAL_ASSESSMENT: 0-10

## 2022-09-16 ASSESSMENT — PAIN DESCRIPTION - LOCATION
LOCATION: NECK

## 2022-09-16 ASSESSMENT — PAIN DESCRIPTION - DESCRIPTORS
DESCRIPTORS: TIGHTNESS

## 2022-09-16 NOTE — ED NOTES
Report to [facility], RN      [facility]  09/16/22 9874

## 2022-09-16 NOTE — ED PROVIDER NOTES
ear pain (minimal near right ear radiating from neck). Negative for dental problem, facial swelling, sinus pressure, sinus pain, sore throat and trouble swallowing. Eyes:  Negative for pain. Respiratory:  Negative for chest tightness and shortness of breath. Cardiovascular:  Negative for chest pain. Gastrointestinal:  Negative for abdominal pain and vomiting. Genitourinary:  Negative for flank pain. Musculoskeletal:  Positive for neck pain (right side) and neck stiffness (due to right sided neck pain). Negative for back pain and gait problem. Skin:  Negative for color change and wound. Neurological:  Positive for headaches (slightly to base of skull on right side, otherwise denies headache). Negative for weakness and numbness. Hematological:  Does not bruise/bleed easily. Psychiatric/Behavioral:  Negative for confusion. The patient is not nervous/anxious. Positives and Pertinent negatives as per HPI.       PASTMEDICAL HISTORY     Past Medical History:   Diagnosis Date    Arthritis     CAD (coronary artery disease)     COPD (chronic obstructive pulmonary disease) (HCC)     CVA (cerebral vascular accident) ([facility] [address]     History of blood transfusion     Hx of blood clots     Hyperlipidemia     Hypertension     Lyme disease          SURGICAL HISTORY       Past Surgical History:   Procedure Laterality Date    APPENDECTOMY      BACK SURGERY      COLONOSCOPY      ENDOSCOPY, COLON, DIAGNOSTIC      EYE SURGERY      FRACTURE SURGERY      HIP SURGERY Left 12/17/2021    LEFT HIP HEMIARTHROPLASTY performed by Cindy Diaz MD at [address] ([address] [place] [address])      JOINT REPLACEMENT      SHOULDER ARTHROSCOPY Right 2/26/2020    RIGHT SHOULDER ARTHROSCOPY WITH DEBRIDEMENT, ROTATOR CUFF REPAIR WITH REGENETEN PATCH, SUBACROMIAL DECOMPRESSION, BICEPS TENODESIS, EXCISION OF MASS  -BLOCK performed by Shari Anne MD at [place] [address] Right 02/26/2020    debridement, rotator cuff repair, Regeneten patch     TONSILLECTOMY      VASCULAR SURGERY           CURRENT MEDICATIONS       Discharge Medication List as of 9/17/2022 12:33 AM        CONTINUE these medications which have NOT CHANGED    Details   losartan-hydroCHLOROthiazide (HYZAAR) 100-25 MG per tablet Take 0.5 tablets by mouth daily, Disp-30 tablet, R-3Normal      amLODIPine (NORVASC) 10 MG tablet Take 10 mg by mouth daily Takes in eveningHistorical Med      busPIRone (BUSPAR) 5 MG tablet Take 5 mg by mouth 2 times dailyHistorical Med      Cholecalciferol (VITAMIN D) 10 MCG (400 UNIT) CAPS Capsule Take 400 Units by mouth dailyHistorical Med      hydrOXYzine HCl (ATARAX) 25 MG tablet Take 25 mg by mouth 2 times daily as needed for ItchingHistorical Med      pantoprazole (PROTONIX) 40 MG tablet Take 40 mg by mouth dailyHistorical Med      Albuterol Sulfate (PROAIR HFA IN) Inhale 90 mcg into the lungs 2 times dailyHistorical Med      b complex vitamins capsule Take 1 capsule by mouth dailyHistorical Med      vitamin C (ASCORBIC ACID) 500 MG tablet Take 500 mg by mouth dailyHistorical Med      vitamin E 400 UNIT capsule Take 400 Units by mouth dailyHistorical Med      naproxen (NAPROSYN) 500 MG tablet Take 1 tablet by mouth 2 times daily (with meals), Disp-60 tablet, R-0Normal      donepezil (ARICEPT) 5 MG tablet Take 5 mg by mouth daily At nightHistorical Med      ezetimibe (ZETIA) 10 MG tablet Take 10 mg by mouth dailyHistorical Med      fluticasone-salmeterol (ADVAIR) 500-50 MCG/DOSE diskus inhaler Inhale 1 puff into the lungs every 12 hoursHistorical Med      raNITIdine (ZANTAC) 150 MG tablet Take 1 tablet by mouth 2 times daily for 5 days, Disp-10 tablet, R-0Print      ondansetron (ZOFRAN) 4 MG tablet Take 1 tablet by mouth every 4 hours as needed for Nausea or Vomiting, Disp-20 tablet, R-0Normal      Probiotic Product (PROBIOTIC ADVANCED PO) Take by mouthHistorical Med      Coenzyme Q10 (COQ10 PO) Take by mouthHistorical Med aspirin 81 MG tablet Take 81 mg by mouth dailyHistorical Med      atorvastatin (LIPITOR) 40 MG tablet Take 80 mg by mouth daily Historical Med      VENLAFAXINE HCL PO Take 150 mg by mouth daily Historical Med      Multiple Vitamins-Minerals (THERAPEUTIC MULTIVITAMIN-MINERALS) tablet Take 1 tablet by mouth dailyHistorical Med      METOPROLOL TARTRATE PO Take 50 mg by mouth 2 times daily Historical Med             ALLERGIES     Carvedilol, Penicillins, Sulfa antibiotics, and Percocet [oxycodone-acetaminophen]    FAMILY HISTORY     No family history on file. SOCIAL HISTORY       Social History     Socioeconomic History    Marital status:    Tobacco Use    Smoking status: Former     Packs/day: 1.50     Types: Cigarettes    Smokeless tobacco: Never   Substance and Sexual Activity    Alcohol use: Not Currently    Drug use: Not Currently     Types: Marijuana (Weed)       SCREENINGS    [place] Coma Scale  Eye Opening: Spontaneous  Best Verbal Response: Oriented  Best Motor Response: Obeys commands  [place] Coma Scale Score: 15           PHYSICAL EXAM    (up to 7 for level 4, 8 or more for level 5)     ED Triage Vitals [09/16/22 1845]   BP Temp Temp src Heart Rate Resp SpO2 Height Weight   (!) 156/75 98.1 °F (36.7 °C) -- 72 19 92 % 5' 1\" (1.549 m) 113 lb (51.3 kg)       Physical Exam  Vitals and nursing note reviewed. Constitutional:       General: She is awake. She is not in acute distress. Appearance: Normal appearance. She is well-developed, well-groomed and normal weight. She is not ill-appearing, toxic-appearing or diaphoretic. Interventions: She is not intubated. HENT:      Head: Normocephalic and atraumatic. Right Ear: External ear normal.      Left Ear: External ear normal.      Nose: Nose normal.      Mouth/Throat:      Mouth: Mucous membranes are moist.   Eyes:      General:         Right eye: No discharge. Left eye: No discharge.       Conjunctiva/sclera: Conjunctivae normal.   Neck:      Thyroid: No thyroid tenderness. Trachea: Trachea and phonation normal. No tracheal tenderness or tracheal deviation. Comments: Normal range of motion when turning to the left but does complain of pain when trying to turn to the right related to right-sided neck pain, able to touch chin to chest without any difficulty and therefore no nuchal rigidity  Cardiovascular:      Rate and Rhythm: Normal rate and regular rhythm. Pulses: Normal pulses. Radial pulses are 2+ on the right side and 2+ on the left side. Pulmonary:      Effort: Pulmonary effort is normal. No tachypnea, bradypnea, accessory muscle usage, prolonged expiration, respiratory distress or retractions. She is not intubated. Breath sounds: Normal breath sounds and air entry. No stridor. No decreased breath sounds, wheezing, rhonchi or rales. Chest:      Chest wall: No tenderness. Abdominal:      General: Abdomen is flat. Bowel sounds are normal. There is no distension. Palpations: Abdomen is soft. Abdomen is not rigid. Tenderness: no abdominal tenderness There is no guarding or rebound. Negative signs include Petersen's sign and McBurney's sign. Musculoskeletal:         General: Tenderness (right posterior neck with palpation of muscle spasm noted) present. No swelling, deformity or signs of injury. Cervical back: Neck supple. No edema, erythema, signs of trauma, rigidity, torticollis, tenderness or crepitus. Pain with movement and muscular tenderness present. No spinous process tenderness. Decreased range of motion. Right lower leg: No edema. Left lower leg: No edema. Lymphadenopathy:      Cervical: No cervical adenopathy. Right cervical: No superficial cervical adenopathy. Left cervical: No superficial cervical adenopathy. Skin:     General: Skin is warm and dry. Coloration: Skin is not ashen, cyanotic, jaundiced or pale.       Findings: No bruising, ecchymosis, erythema, signs of injury, laceration, rash or wound. Neurological:      General: No focal deficit present. Mental Status: She is alert and oriented to person, place, and time. Mental status is at baseline. GCS: GCS eye subscore is 4. GCS verbal subscore is 5. GCS motor subscore is 6. Cranial Nerves: No dysarthria or facial asymmetry. Sensory: Sensation is intact. No sensory deficit. Motor: Motor function is intact. No weakness, tremor, atrophy, abnormal muscle tone or seizure activity. Comments: Normal  strength bilaterally and normal ankle dorsiflexion/plantar flexion bilaterally with strength 5 out of 5 to bilateral upper and lower extremities   Psychiatric:         Attention and Perception: Attention normal.         Mood and Affect: Mood and affect normal.         Speech: Speech normal. Speech is not delayed or slurred. Behavior: Behavior normal. Behavior is cooperative. DIAGNOSTIC RESULTS   :    Labs Reviewed   COMPREHENSIVE METABOLIC PANEL - Abnormal; Notable for the following components:       Result Value    Chloride 98 (*)     Glucose 102 (*)     BUN 24 (*)     Albumin/Globulin Ratio 0.8 (*)     Alkaline Phosphatase 163 (*)     ALT 9 (*)     All other components within normal limits   CBC WITH AUTO DIFFERENTIAL   TROPONIN       All other labs were within normal range or not returned asof this dictation. EKG: All EKG's are interpreted by the Emergency Department Physician who either signs or Co-signs this chart in the absence of a cardiologist.    The Ekg interpreted by me shows  Sinus rhythm with occasional PVCs noted  with a rate of 76  Axis is   Left axis deviation  QTc is   prolonged QT  Intervals and Durations are unremarkable.       ST Segments: no acute change and nonspecific changes  No significant change from prior EKG dated - 12/17/21  No STEMI         RADIOLOGY:   Non-plain film images such as CT, Ultrasound and MRI are read by the radiologist. 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 Moderate short segment narrowing both vertebral arteries due to extrinsic   compression. No high-grade narrowing involving the vertebral arteries or   acute dissection identified involving the cervical vasculature. Stable appearance of the aneurysm left cavernous ICA. PROCEDURES   Unless otherwise noted below, none     Procedures    CRITICAL CARE TIME   N/A    CONSULTS:  None    EMERGENCY DEPARTMENT COURSE and DIFFERENTIAL DIAGNOSIS/MDM:   Vitals:    Vitals:    09/16/22 2300 09/16/22 2330 09/17/22 0000 09/17/22 0030   BP: (!) 124/53 (!) 133/51 (!) 127/51 133/60   Pulse:   76 74   Resp:       Temp:       SpO2: 94% 92% 91% 92%   Weight:       Height:           Patient was given the following medications:  Medications   ibuprofen (ADVIL;MOTRIN) tablet 400 mg (400 mg Oral Given 9/16/22 1945)   acetaminophen (TYLENOL) tablet 650 mg (650 mg Oral Given 9/16/22 1945)   methocarbamol (ROBAXIN) tablet 1,000 mg (1,000 mg Oral Given 9/16/22 1945)   ondansetron (ZOFRAN) injection 4 mg (4 mg IntraVENous Given 9/16/22 2124)   diazePAM (VALIUM) tablet 2 mg (2 mg Oral Given 9/16/22 2123)   morphine (PF) injection 2 mg (2 mg IntraVENous Given 9/16/22 2123)   iopamidol (ISOVUE-370) 76 % injection 75 mL (75 mLs IntraVENous Given 9/16/22 2210)       Patient was evaluated due to waking up with right-sided neck pain over the last 4 days that has been constant but worse with movement. She denies any chest pain or shortness of breath and story not suggestive of acute coronary syndrome or pulmonary embolism.   She had no numbness or weakness in the arms or legs and story not suggestive of dissection or stroke. We did give ibuprofen along with Tylenol and Robaxin to see if this helps with her pain. She had good range of motion of the neck to the left and with her chin touching the chest and therefore no nuchal rigidity but did have pain when looking to the right and had obvious muscle spasm to the right posterior neck which would explain her pain. She initially had no significant improvement with Tylenol along with Robaxin and Motrin and therefore ultimately I did have lab work sent off for CT of the neck to ensure no other significant findings and also gave her morphine along with Valium. Following the additional medication, she was finally feeling much better and did have good range of motion to the right side as well. 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 Again, she had no stroke concerns at this time. Story not concerning for meningitis or subarachnoid hemorrhage. EKG was reassuring. She is aware that if she does develop any new onset chest pain with shortness of breath, worsening neck stiffness with severe headache, fever, new numbness or weakness on one side of the body, then return to the emergency department immediately, but otherwise try ibuprofen or Tylenol as needed for pain and Valium for muscle spasms but do not drive with this and understand it can be addictive. She was willing to accept the risk of taking Valium and also was told to be very careful when walking with it. She had full mental capacity to make her own medical decisions. She was told otherwise follow-up with her primary doctor over the next few days for any other concerns with her neck and try gentle massage and heating pads for 15 minutes at a time as needed to see if this helps. She was well-appearing and in no acute distress at time of discharge and felt comfortable with this plan.     Left cavernous carotid aneurysm noted on CT today which was reported stable compared to old CTA and her primary doctor ordered that testing and therefore this should be followed further by primary care provider Tonja Shell since that should already been established as a diagnosis. She ordered the other study earlier this year in January of 2022 and therefore this is not a new incidental findings and primary doctor can follow up on this further for any other concerns. The patient tolerated their visit well. The patient and / or the family were informed of the results of any tests, a time was given to answer questions. FINAL IMPRESSION      1. Neck pain on right side    2. Atherosclerosis of right carotid artery    3. Trapezius muscle spasm    4. Left cavernous carotid aneurysm          DISPOSITION/PLAN   DISPOSITION Decision To Discharge 09/16/2022 11:53:04 PM-Discharged in improved, stable condition      PATIENT REFERRED TO:  [place] [facility]  [address] [facility]  [phone]  Go to   If symptoms worsen    Duane Jones APRN - CNP  [address]  [phone]    In 3 days  For any other concerns    DISCHARGEMEDICATIONS:  Discharge Medication List as of 9/17/2022 12:33 AM        START taking these medications    Details   diazePAM (VALIUM) 2 MG tablet Take 1 tablet by mouth every 8 hours as needed for Anxiety (muscle spasm) for up to 2 days. , Disp-5 tablet, R-0Print             DISCONTINUED MEDICATIONS:  Discharge Medication List as of 9/17/2022 12:33 AM                 (Please note that portions of this note were completed with a voicerecognition program.  Efforts were made to edit the dictations but occasionally words are mis-transcribed.)    Jorge Jeong MD (electronically signed)            Jorge Jeong MD  09/17/22 Gus Walter MD  09/17/22 0121

## 2022-09-17 VITALS
TEMPERATURE: 98 F | RESPIRATION RATE: 18 BRPM | DIASTOLIC BLOOD PRESSURE: 60 MMHG | OXYGEN SATURATION: 92 % | WEIGHT: 113 LBS | SYSTOLIC BLOOD PRESSURE: 133 MMHG | HEIGHT: 61 IN | BODY MASS INDEX: 21.34 KG/M2 | HEART RATE: 74 BPM

## 2022-09-17 PROBLEM — I67.1 LEFT CAVERNOUS CAROTID ANEURYSM: Status: ACTIVE | Noted: 2022-09-17

## 2022-09-17 LAB
EKG ATRIAL RATE: 76 BPM
EKG DIAGNOSIS: NORMAL
EKG P AXIS: 69 DEGREES
EKG P-R INTERVAL: 160 MS
EKG Q-T INTERVAL: 424 MS
EKG QRS DURATION: 104 MS
EKG QTC CALCULATION (BAZETT): 477 MS
EKG R AXIS: -35 DEGREES
EKG T AXIS: 48 DEGREES
EKG VENTRICULAR RATE: 76 BPM
TROPONIN: <0.01 NG/ML

## 2022-09-17 PROCEDURE — 93010 ELECTROCARDIOGRAM REPORT: CPT | Performed by: INTERNAL MEDICINE

## 2022-09-17 PROCEDURE — 93005 ELECTROCARDIOGRAM TRACING: CPT | Performed by: EMERGENCY MEDICINE

## 2022-09-17 NOTE — DISCHARGE INSTRUCTIONS
Take Tylenol or ibuprofen as needed for pain. Take Valium as a muscle relaxer but do not drive with this or mix with alcohol. Be careful when walking when taking this and use assistance such as a walker to be safe. Try gentle massage to the upper back to see if this helps. Do not perform any neck manipulations. Follow-up with your primary doctor in 3 to 5 days for further assessment, especially if pain persist.    Return to the emergency department for any worsening neck pain with new shortness of breath, new chest pain, weakness in the arm, severe headache, or any other concerns.

## 2023-02-22 NOTE — ED NOTES
Left message for the patient that [employer] will be faxing over his FMLA form and if he had any questions to call me at the office  Physician made aware of pt BP. Pt repositioned for comfort.      Dalia Lakhani RN  12/17/21 [id number]

## 2023-03-02 ENCOUNTER — HOSPITAL ENCOUNTER (EMERGENCY)
Age: 83
Discharge: HOME | End: 2023-03-02
Payer: MEDICARE

## 2023-03-02 ENCOUNTER — APPOINTMENT (OUTPATIENT)
Dept: GENERAL RADIOLOGY | Age: 83
End: 2023-03-02
Payer: MEDICARE

## 2023-03-02 VITALS
HEIGHT: 62 IN | WEIGHT: 105.25 LBS | SYSTOLIC BLOOD PRESSURE: 175 MMHG | RESPIRATION RATE: 17 BRPM | DIASTOLIC BLOOD PRESSURE: 86 MMHG | OXYGEN SATURATION: 98 % | BODY MASS INDEX: 19.37 KG/M2 | TEMPERATURE: 97.9 F | HEART RATE: 98 BPM

## 2023-03-02 DIAGNOSIS — J44.1 COPD EXACERBATION (HCC): Primary | ICD-10-CM

## 2023-03-02 LAB
A/G RATIO: 1.4 (ref 1.1–2.2)
ALBUMIN SERPL-MCNC: 4.1 G/DL (ref 3.4–5)
ALP BLD-CCNC: 148 U/L (ref 40–129)
ALT SERPL-CCNC: 8 U/L (ref 10–40)
ANION GAP SERPL CALCULATED.3IONS-SCNC: 9 MMOL/L (ref 3–16)
AST SERPL-CCNC: 19 U/L (ref 15–37)
BASE EXCESS VENOUS: 0.3 MMOL/L (ref -3–3)
BASOPHILS ABSOLUTE: 0.1 K/UL (ref 0–0.2)
BASOPHILS RELATIVE PERCENT: 1.1 %
BILIRUB SERPL-MCNC: 0.4 MG/DL (ref 0–1)
BUN BLDV-MCNC: 18 MG/DL (ref 7–20)
CALCIUM SERPL-MCNC: 9.5 MG/DL (ref 8.3–10.6)
CARBOXYHEMOGLOBIN: 3.4 % (ref 0–1.5)
CHLORIDE BLD-SCNC: 101 MMOL/L (ref 99–110)
CO2: 29 MMOL/L (ref 21–32)
CREAT SERPL-MCNC: 0.8 MG/DL (ref 0.6–1.2)
EKG ATRIAL RATE: 100 BPM
EKG DIAGNOSIS: NORMAL
EKG P AXIS: 56 DEGREES
EKG P-R INTERVAL: 162 MS
EKG Q-T INTERVAL: 388 MS
EKG QRS DURATION: 102 MS
EKG QTC CALCULATION (BAZETT): 500 MS
EKG R AXIS: -31 DEGREES
EKG T AXIS: 44 DEGREES
EKG VENTRICULAR RATE: 100 BPM
EOSINOPHILS ABSOLUTE: 0.3 K/UL (ref 0–0.6)
EOSINOPHILS RELATIVE PERCENT: 5.2 %
GFR SERPL CREATININE-BSD FRML MDRD: >60 ML/MIN/{1.73_M2}
GLUCOSE BLD-MCNC: 105 MG/DL (ref 70–99)
HCO3 VENOUS: 26.3 MMOL/L (ref 23–29)
HCT VFR BLD CALC: 38.6 % (ref 36–48)
HEMOGLOBIN: 12.7 G/DL (ref 12–16)
LYMPHOCYTES ABSOLUTE: 1.4 K/UL (ref 1–5.1)
LYMPHOCYTES RELATIVE PERCENT: 21.9 %
MCH RBC QN AUTO: 30.2 PG (ref 26–34)
MCHC RBC AUTO-ENTMCNC: 32.8 G/DL (ref 31–36)
MCV RBC AUTO: 92.2 FL (ref 80–100)
METHEMOGLOBIN VENOUS: 0.3 %
MONOCYTES ABSOLUTE: 0.5 K/UL (ref 0–1.3)
MONOCYTES RELATIVE PERCENT: 8.6 %
NEUTROPHILS ABSOLUTE: 4 K/UL (ref 1.7–7.7)
NEUTROPHILS RELATIVE PERCENT: 63.2 %
O2 CONTENT, VEN: 12 VOL %
O2 SAT, VEN: 61 %
O2 THERAPY: ABNORMAL
PCO2, VEN: 47.9 MMHG (ref 40–50)
PDW BLD-RTO: 14.4 % (ref 12.4–15.4)
PH VENOUS: 7.36 (ref 7.35–7.45)
PLATELET # BLD: 236 K/UL (ref 135–450)
PMV BLD AUTO: 8 FL (ref 5–10.5)
PO2, VEN: 33.5 MMHG (ref 25–40)
POTASSIUM REFLEX MAGNESIUM: 4 MMOL/L (ref 3.5–5.1)
RBC # BLD: 4.19 M/UL (ref 4–5.2)
SODIUM BLD-SCNC: 139 MMOL/L (ref 136–145)
TCO2 CALC VENOUS: 28 MMOL/L
TOTAL PROTEIN: 7 G/DL (ref 6.4–8.2)
TROPONIN: <0.01 NG/ML
WBC # BLD: 6.3 K/UL (ref 4–11)

## 2023-03-02 PROCEDURE — 84484 ASSAY OF TROPONIN QUANT: CPT

## 2023-03-02 PROCEDURE — 6360000002 HC RX W HCPCS: Performed by: PHYSICIAN ASSISTANT

## 2023-03-02 PROCEDURE — 96374 THER/PROPH/DIAG INJ IV PUSH: CPT

## 2023-03-02 PROCEDURE — 82803 BLOOD GASES ANY COMBINATION: CPT

## 2023-03-02 PROCEDURE — 71045 X-RAY EXAM CHEST 1 VIEW: CPT

## 2023-03-02 PROCEDURE — 36415 COLL VENOUS BLD VENIPUNCTURE: CPT

## 2023-03-02 PROCEDURE — 80053 COMPREHEN METABOLIC PANEL: CPT

## 2023-03-02 PROCEDURE — 85025 COMPLETE CBC W/AUTO DIFF WBC: CPT

## 2023-03-02 PROCEDURE — 6370000000 HC RX 637 (ALT 250 FOR IP): Performed by: PHYSICIAN ASSISTANT

## 2023-03-02 PROCEDURE — 93010 ELECTROCARDIOGRAM REPORT: CPT | Performed by: INTERNAL MEDICINE

## 2023-03-02 PROCEDURE — 93005 ELECTROCARDIOGRAM TRACING: CPT | Performed by: PHYSICIAN ASSISTANT

## 2023-03-02 PROCEDURE — 99285 EMERGENCY DEPT VISIT HI MDM: CPT

## 2023-03-02 RX ORDER — METHYLPREDNISOLONE 4 MG/1
4 TABLET ORAL SEE ADMIN INSTRUCTIONS
Qty: 1 KIT | Refills: 0 | Status: SHIPPED | OUTPATIENT
Start: 2023-03-02 | End: 2023-03-02 | Stop reason: SDUPTHER

## 2023-03-02 RX ORDER — DOXYCYCLINE HYCLATE 100 MG
100 TABLET ORAL 2 TIMES DAILY
Qty: 14 TABLET | Refills: 0 | Status: SHIPPED | OUTPATIENT
Start: 2023-03-02 | End: 2023-03-09

## 2023-03-02 RX ORDER — ALBUTEROL SULFATE 90 UG/1
2 AEROSOL, METERED RESPIRATORY (INHALATION) EVERY 6 HOURS PRN
Qty: 18 G | Refills: 0 | Status: SHIPPED | OUTPATIENT
Start: 2023-03-02

## 2023-03-02 RX ORDER — DOXYCYCLINE HYCLATE 100 MG
100 TABLET ORAL ONCE
Status: CANCELLED | OUTPATIENT
Start: 2023-03-02 | End: 2023-03-02

## 2023-03-02 RX ORDER — METHYLPREDNISOLONE 4 MG/1
4 TABLET ORAL SEE ADMIN INSTRUCTIONS
Qty: 1 KIT | Refills: 0 | Status: SHIPPED | OUTPATIENT
Start: 2023-03-02 | End: 2023-03-08

## 2023-03-02 RX ORDER — DOXYCYCLINE HYCLATE 100 MG
100 TABLET ORAL ONCE
Status: COMPLETED | OUTPATIENT
Start: 2023-03-02 | End: 2023-03-02

## 2023-03-02 RX ORDER — DOXYCYCLINE HYCLATE 100 MG
100 TABLET ORAL 2 TIMES DAILY
Qty: 14 TABLET | Refills: 0 | Status: SHIPPED | OUTPATIENT
Start: 2023-03-02 | End: 2023-03-02 | Stop reason: SDUPTHER

## 2023-03-02 RX ORDER — ALBUTEROL SULFATE 90 UG/1
2 AEROSOL, METERED RESPIRATORY (INHALATION) EVERY 6 HOURS PRN
Qty: 18 G | Refills: 0 | Status: SHIPPED | OUTPATIENT
Start: 2023-03-02 | End: 2023-03-02 | Stop reason: SDUPTHER

## 2023-03-02 RX ORDER — METHYLPREDNISOLONE SODIUM SUCCINATE 125 MG/2ML
60 INJECTION, POWDER, LYOPHILIZED, FOR SOLUTION INTRAMUSCULAR; INTRAVENOUS ONCE
Status: COMPLETED | OUTPATIENT
Start: 2023-03-02 | End: 2023-03-02

## 2023-03-02 RX ORDER — IPRATROPIUM BROMIDE AND ALBUTEROL SULFATE 2.5; .5 MG/3ML; MG/3ML
1 SOLUTION RESPIRATORY (INHALATION) ONCE
Status: COMPLETED | OUTPATIENT
Start: 2023-03-02 | End: 2023-03-02

## 2023-03-02 RX ADMIN — METHYLPREDNISOLONE SODIUM SUCCINATE 60 MG: 125 INJECTION, POWDER, FOR SOLUTION INTRAMUSCULAR; INTRAVENOUS at 15:10

## 2023-03-02 RX ADMIN — IPRATROPIUM BROMIDE AND ALBUTEROL SULFATE 1 AMPULE: 2.5; .5 SOLUTION RESPIRATORY (INHALATION) at 13:49

## 2023-03-02 RX ADMIN — DOXYCYCLINE HYCLATE 100 MG: 100 TABLET, COATED ORAL at 15:09

## 2023-03-02 ASSESSMENT — ENCOUNTER SYMPTOMS
COUGH: 1
ABDOMINAL PAIN: 0
VOMITING: 0
SHORTNESS OF BREATH: 1

## 2023-03-02 ASSESSMENT — PAIN - FUNCTIONAL ASSESSMENT: PAIN_FUNCTIONAL_ASSESSMENT: NONE - DENIES PAIN

## 2023-03-02 NOTE — ED NOTES
Patient medicated for breathing treatment, states she feels as if it has improved her SOB     Ayesha Coffey, MARTIN  03/02/23 9752

## 2023-03-02 NOTE — ED PROVIDER NOTES
[address] ED  EMERGENCY DEPARTMENT ENCOUNTER        Pt Name: Joey Seo  MRN: [id number]  [place] 1940  Date of evaluation: 3/2/2023  Provider: Leo Velasquez PA-C  PCP: AGAPITO Hopson CNP  Note Started: 1:23 PM EST 3/2/23      AGUILAR. I have evaluated this patient. My supervising physician was available for consultation. CHIEF COMPLAINT       Chief Complaint   Patient presents with    Shortness of Breath     Patient has had increased shortness of breath over the last 3 days. Productive cough, sent by PMD due to not having her medications       HISTORY OF PRESENT ILLNESS: 1 or more Elements     History From: Patient  Limitations to history : None    Joey Seo is a 81 y.o. female who presents to the emergency department for evaluation of cough states she has been sick for a couple of weeks productive cough clear phlegm increasing shortness of breath the past 3 days. She has COPD. She has a rescue inhaler was but was not able to afford the preventative inhaler. She had at home nurse come out today states they come once a year and when she had her walking her oxygen saturation was 90% and told her she needed to come in and get checked out get a chest x-ray she may have pneumonia. Patient denies any chest pain. No leg swelling. No fever. States she had a COVID-19 test yesterday was negative. Nursing Notes were all reviewed and agreed with or any disagreements were addressed in the HPI. REVIEW OF SYSTEMS :      Review of Systems   Constitutional:  Negative for fever. Respiratory:  Positive for cough and shortness of breath. Cardiovascular:  Negative for chest pain and leg swelling. Gastrointestinal:  Negative for abdominal pain and vomiting. Positives and Pertinent negatives as per HPI.      SURGICAL HISTORY     Past Surgical History:   Procedure Laterality Date    APPENDECTOMY      BACK SURGERY      COLONOSCOPY      ENDOSCOPY, COLON, DIAGNOSTIC EYE SURGERY      FRACTURE SURGERY      HIP SURGERY Left 12/17/2021    LEFT HIP HEMIARTHROPLASTY performed by Gareth Mike MD at [facility] ([facility])      JOINT REPLACEMENT      SHOULDER ARTHROSCOPY Right 2/26/2020    RIGHT SHOULDER ARTHROSCOPY WITH DEBRIDEMENT, ROTATOR CUFF REPAIR WITH REGENETEN PATCH, SUBACROMIAL DECOMPRESSION, BICEPS TENODESIS, EXCISION OF MASS  -BLOCK performed by Antonietta Ramirez MD at [place] [address] Right 02/26/2020    debridement, rotator cuff repair, Regeneten patch     [facility]       Discharge Medication List as of 3/2/2023  3:37 PM        CONTINUE these medications which have NOT CHANGED    Details   losartan-hydroCHLOROthiazide (HYZAAR) 100-25 MG per tablet Take 0.5 tablets by mouth daily, Disp-30 tablet, R-3Normal      amLODIPine (NORVASC) 10 MG tablet Take 10 mg by mouth daily Takes in eveningHistorical Med      busPIRone (BUSPAR) 5 MG tablet Take 5 mg by mouth 2 times dailyHistorical Med      Cholecalciferol (VITAMIN D) 10 MCG (400 UNIT) CAPS Capsule Take 400 Units by mouth dailyHistorical Med      hydrOXYzine HCl (ATARAX) 25 MG tablet Take 25 mg by mouth 2 times daily as needed for ItchingHistorical Med      pantoprazole (PROTONIX) 40 MG tablet Take 40 mg by mouth dailyHistorical Med      !!  Albuterol Sulfate (PROAIR HFA IN) Inhale 90 mcg into the lungs 2 times dailyHistorical Med      b complex vitamins capsule Take 1 capsule by mouth dailyHistorical Med      vitamin C (ASCORBIC ACID) 500 MG tablet Take 500 mg by mouth dailyHistorical Med      vitamin E 400 UNIT capsule Take 400 Units by mouth dailyHistorical Med      naproxen (NAPROSYN) 500 MG tablet Take 1 tablet by mouth 2 times daily (with meals), Disp-60 tablet, R-0Normal      donepezil (ARICEPT) 5 MG tablet Take 5 mg by mouth daily At nightHistorical Med      ezetimibe (ZETIA) 10 MG tablet Take 10 mg by mouth dailyHistorical Med      fluticasone-salmeterol (ADVAIR) 500-50 MCG/DOSE diskus inhaler Inhale 1 puff into the lungs every 12 hoursHistorical Med      raNITIdine (ZANTAC) 150 MG tablet Take 1 tablet by mouth 2 times daily for 5 days, Disp-10 tablet, R-0Print      ondansetron (ZOFRAN) 4 MG tablet Take 1 tablet by mouth every 4 hours as needed for Nausea or Vomiting, Disp-20 tablet, R-0Normal      Probiotic Product (PROBIOTIC ADVANCED PO) Take by mouthHistorical Med      Coenzyme Q10 (COQ10 PO) Take by mouthHistorical Med      aspirin 81 MG tablet Take 81 mg by mouth dailyHistorical Med      atorvastatin (LIPITOR) 40 MG tablet Take 80 mg by mouth daily Historical Med      VENLAFAXINE HCL PO Take 150 mg by mouth daily Historical Med      Multiple Vitamins-Minerals (THERAPEUTIC MULTIVITAMIN-MINERALS) tablet Take 1 tablet by mouth dailyHistorical Med      METOPROLOL TARTRATE PO Take 50 mg by mouth 2 times daily Historical Med       !! - Potential duplicate medications found. Please discuss with provider. ALLERGIES     Carvedilol, Penicillins, Sulfa antibiotics, and Percocet [oxycodone-acetaminophen]    FAMILYHISTORY     No family history on file. SOCIAL HISTORY       Social History     Tobacco Use    Smoking status: Former     Packs/day: 1.50     Types: Cigarettes    Smokeless tobacco: Never   Substance Use Topics    Alcohol use: Not Currently    Drug use: Not Currently     Types: Marijuana (Weed)       SCREENINGS        Jadwin Coma Scale  Eye Opening: Spontaneous  Best Verbal Response: Oriented  Best Motor Response: Obeys commands  Virgilio Coma Scale Score: 15                CIWA Assessment  BP: (!) 175/86  Heart Rate: 98           PHYSICAL EXAM  1 or more Elements     ED Triage Vitals [03/02/23 1258]   BP Temp Temp Source Heart Rate Resp SpO2 Height Weight   (!) 179/89 97.9 °F (36.6 °C) Oral 99 15 99 % 5' 2\" (1.575 m) 105 lb 4 oz (47.7 kg)       Physical Exam  Vitals and nursing note reviewed.    Constitutional: Appearance: She is well-developed. She is not toxic-appearing. HENT:      Head: Normocephalic and atraumatic. Mouth/Throat:      Mouth: Mucous membranes are moist.      Pharynx: Oropharynx is clear. No posterior oropharyngeal erythema. Cardiovascular:      Rate and Rhythm: Normal rate and regular rhythm. Pulses:           Dorsalis pedis pulses are 2+ on the right side and 2+ on the left side. Pulmonary:      Effort: Pulmonary effort is normal. No respiratory distress. Breath sounds: Wheezing (bilateral) present. No rales. Abdominal:      General: Bowel sounds are normal. There is no distension. Palpations: Abdomen is soft. Tenderness: There is no abdominal tenderness. There is no guarding or rebound. Musculoskeletal:      Right lower leg: No edema. Left lower leg: No edema. Skin:     General: Skin is warm and dry. Neurological:      Mental Status: She is alert and oriented to person, place, and time. Motor: No abnormal muscle tone. Psychiatric:         Behavior: Behavior normal. Behavior is cooperative.            DIAGNOSTIC RESULTS   LABS:    Labs Reviewed   COMPREHENSIVE METABOLIC PANEL W/ REFLEX TO MG FOR LOW K - Abnormal; Notable for the following components:       Result Value    Glucose 105 (*)     Alkaline Phosphatase 148 (*)     ALT 8 (*)     All other components within normal limits   BLOOD GAS, VENOUS - Abnormal; Notable for the following components:    Carboxyhemoglobin 3.4 (*)     All other components within normal limits   CBC WITH AUTO DIFFERENTIAL   TROPONIN     Results for orders placed or performed during the hospital encounter of 03/02/23   CBC with Auto Differential   Result Value Ref Range    WBC 6.3 4.0 - 11.0 K/uL    RBC 4.19 4.00 - 5.20 M/uL    Hemoglobin 12.7 12.0 - 16.0 g/dL    Hematocrit 38.6 36.0 - 48.0 %    MCV 92.2 80.0 - 100.0 fL    MCH 30.2 26.0 - 34.0 pg    MCHC 32.8 31.0 - 36.0 g/dL    RDW 14.4 12.4 - 15.4 %    Platelets 934 083 - 034 K/uL    MPV 8.0 5.0 - 10.5 fL    Neutrophils % 63.2 %    Lymphocytes % 21.9 %    Monocytes % 8.6 %    Eosinophils % 5.2 %    Basophils % 1.1 %    Neutrophils Absolute 4.0 1.7 - 7.7 K/uL    Lymphocytes Absolute 1.4 1.0 - 5.1 K/uL    Monocytes Absolute 0.5 0.0 - 1.3 K/uL    Eosinophils Absolute 0.3 0.0 - 0.6 K/uL    Basophils Absolute 0.1 0.0 - 0.2 K/uL   Comprehensive Metabolic Panel w/ Reflex to MG   Result Value Ref Range    Sodium 139 136 - 145 mmol/L    Potassium reflex Magnesium 4.0 3.5 - 5.1 mmol/L    Chloride 101 99 - 110 mmol/L    CO2 29 21 - 32 mmol/L    Anion Gap 9 3 - 16    Glucose 105 (H) 70 - 99 mg/dL    BUN 18 7 - 20 mg/dL    Creatinine 0.8 0.6 - 1.2 mg/dL    Est, Glom Filt Rate >60 >60    Calcium 9.5 8.3 - 10.6 mg/dL    Total Protein 7.0 6.4 - 8.2 g/dL    Albumin 4.1 3.4 - 5.0 g/dL    Albumin/Globulin Ratio 1.4 1.1 - 2.2    Total Bilirubin 0.4 0.0 - 1.0 mg/dL    Alkaline Phosphatase 148 (H) 40 - 129 U/L    ALT 8 (L) 10 - 40 U/L    AST 19 15 - 37 U/L   Troponin   Result Value Ref Range    Troponin <0.01 <0.01 ng/mL   Blood gas, venous   Result Value Ref Range    pH, Jean-Paul 7.358 7.350 - 7.450    pCO2, Jean-Paul 47.9 40.0 - 50.0 mmHg    pO2, Jean-Paul 33.5 25.0 - 40.0 mmHg    HCO3, Venous 26.3 23.0 - 29.0 mmol/L    Base Excess, Jean-Paul 0.3 -3.0 - 3.0 mmol/L    O2 Sat, Jean-Paul 61 Not Established %    Carboxyhemoglobin 3.4 (H) 0.0 - 1.5 %    MetHgb, Jean-Paul 0.3 <1.5 %    TC02 (Calc), Jean-Paul 28 Not Established mmol/L    O2 Content, Jean-Paul 12 Not Established VOL %    O2 Therapy Unknown    EKG 12 Lead   Result Value Ref Range    Ventricular Rate 100 BPM    Atrial Rate 100 BPM    P-R Interval 162 ms    QRS Duration 102 ms    Q-T Interval 388 ms    QTc Calculation (Bazett) 500 ms    P Axis 56 degrees    R Axis -31 degrees    T Axis 44 degrees    Diagnosis       Sinus rhythm with Premature supraventricular complexesPossible Left atrial enlargementLeft axis deviationLeft ventricular hypertrophyCannot rule out Septal infarct (cited on or before 02-MAR-2023)Abnormal ECGWhen compared with ECG of 17-SEP-2022 00:15,Premature ventricular complexes are no longer PresentPremature supraventricular complexes are now PresentConfirmed by Valma Nyhan MD, [address] ([id number]) on 3/2/2023 5:53:23 PM           When ordered only abnormal lab results are displayed. All other labs were within normal range or not returned as of this dictation. EKG: When ordered, EKG's are interpreted by the Emergency Department Physician in the absence of a cardiologist.  Please see their note for interpretation of EKG. RADIOLOGY:   Non-plain film images such as CT, Ultrasound and MRI are read by the radiologist. Plain radiographic images are visualized and preliminarily interpreted by the ED Provider with the below findings:    Preliminary x-ray interpretation by myself independently, in absence of radiologist (Final interpretation by radiologist to follow):      Chest x-ray: No acute process no pneumonia no pleural effusion      Interpretation per the Radiologist below, if available at the time of this note:    XR CHEST PORTABLE   Final Result   No acute cardiopulmonary findings           XR CHEST PORTABLE    Result Date: 3/2/2023  EXAMINATION: ONE XRAY VIEW OF THE CHEST 3/2/2023 1:28 pm COMPARISON: None. HISTORY: ORDERING SYSTEM PROVIDED HISTORY: cough r/o pneumonia TECHNOLOGIST PROVIDED HISTORY: Reason for exam:->cough r/o pneumonia Reason for Exam: cough, r/o pneumonia FINDINGS: Normal cardiomediastinal silhouette. No acute airspace infiltrate. No pneumothorax or pleural effusion. No acute cardiopulmonary findings      No results found.     PROCEDURES   Unless otherwise noted below, none     Procedures    CRITICAL CARE TIME (.cctime)   0    PAST MEDICAL HISTORY      has a past medical history of Arthritis, CAD (coronary artery disease), COPD (chronic obstructive pulmonary disease) ([facility] [address] CVA (cerebral vascular accident) ([facility] [address] History of blood transfusion, blood clots, Hyperlipidemia, Hypertension, and Lyme disease. EMERGENCY DEPARTMENT COURSE and DIFFERENTIAL DIAGNOSIS/MDM:   Vitals:    Vitals:    03/02/23 1258 03/02/23 1301 03/02/23 1402 03/02/23 1510   BP: (!) 179/89 (!) 170/99 (!) 175/115 (!) 175/86   Pulse: 99  100 98   Resp: 15  18 17   Temp: 97.9 °F (36.6 °C)      TempSrc: Oral      SpO2: 99% 98% 100% 98%   Weight: 105 lb 4 oz (47.7 kg)      Height: 5' 2\" (1.575 m)          Patient was given the following medications:  Medications   ipratropium-albuterol (DUONEB) nebulizer solution 1 ampule (1 ampule Inhalation Given 3/2/23 1349)   doxycycline hyclate (VIBRA-TABS) tablet 100 mg (100 mg Oral Given 3/2/23 1509)   methylPREDNISolone sodium (SOLU-MEDROL) injection 60 mg (60 mg IntraVENous Given 3/2/23 1510)             Is this patient to be included in the SEP-1 Core Measure due to severe sepsis or septic shock? No   Exclusion criteria - the patient is NOT to be included for SEP-1 Core Measure due to:  Viral etiology found or highly suspected (including COVID-19) without concomitant bacterial infection    Chronic Conditions affecting care:    has a past medical history of Arthritis, CAD (coronary artery disease), COPD (chronic obstructive pulmonary disease) ([facility] [address] CVA (cerebral vascular accident) ([facility] [address] History of blood transfusion, blood clots, Hyperlipidemia, Hypertension, and Lyme disease. CONSULTS: (Who and What was discussed)  None          Records Reviewed (External and Source)       CC/HPI Summary, DDx, ED Course, and Reassessment:     Patient presented to the ER for evaluation of cough and shortness of breath. She has COPD. She has a rescue inhaler states she could not afford her preventative medication. Symptoms worsening over the past 3 days. Was seen by at home nurse was sent here for evaluation reported oxygen saturation was 90% with ambulation at home. No acute respiratory distress here respirations are even.   Oxygen saturation is 99% with her sitting in the bed we will back ambulatory pulse ox. She has coarse sounding cough and she has bilateral wheezes. Given DuoNeb treatment and will check basic labs EKG chest x-ray and reevaluate. Patient is stable. Differential diagnosis includes but is not limited to COPD exacerbation, pneumonia, bronchitis, ACS. EKG no acute ischemia troponin is normal I do not suspect ACS    Chest x-ray reviewed by myself no pneumonia no pleural effusion no CHF    No significant findings on labs    Suspect COPD exacerbation with respiratory illness. I did offer her admission into the hospital for further evaluation of symptoms but she is declining states she wants to go home. She ambulated and oxygen saturation remained above 91% and she is at 97% at rest.  She was given a breathing treatment here with improvement states she is feeling better and her lung sounds have improved on repeat evaluation. Wants to go home. I will prescribe her doxycycline and Medrol Dosepak, refill of her albuterol inhaler advised close follow-up with her doctor and we discussed strict return precautions returning for any worsening symptoms. She understands and agrees. I estimate there is LOW risk for  PULMONARY EDEMA, PNEUMONIA, PNEUMOTHORAX, STATUS ASTHMATICUS, ACUTE RESPIRATORY FAILURE, OR ACUTE CORONARY SYNDROME, thus I consider the discharge disposition reasonable. I am the Primary Clinician of Record.   FINAL IMPRESSION      1. COPD exacerbation ([facility] [address]          DISPOSITION/PLAN     DISPOSITION Decision to Discharge    PATIENT REFERRED TO:  AGAPITO Preciado - CNP  [address] [place] [address]  [phone]    Call in 1 day  call for follow up appointment from ER visit    [place] [facility]  [address] [facility]  [phone]    If symptoms worsen    DISCHARGE MEDICATIONS:  Discharge Medication List as of 3/2/2023  3:37 PM        START taking these medications    Details   methylPREDNISolone (MEDROLJANUARY,) 4 MG tablet Take 1 tablet by mouth See Admin Instructions for 6 days Take by mouth., Disp-1 kit, R-0Normal      doxycycline hyclate (VIBRA-TABS) 100 MG tablet Take 1 tablet by mouth 2 times daily for 7 days, Disp-14 tablet, R-0Normal      !! albuterol sulfate HFA (PROVENTIL HFA) 108 (90 Base) MCG/ACT inhaler Inhale 2 puffs into the lungs every 6 hours as needed for Wheezing or Shortness of Breath, Disp-18 g, R-0Normal       !! - Potential duplicate medications found. Please discuss with provider.           DISCONTINUED MEDICATIONS:  Discharge Medication List as of 3/2/2023  3:37 PM                 (Please note that portions of this note were completed with a voice recognition program.  Efforts were made to edit the dictations but occasionally words are mis-transcribed.)    Eloina Zavala PA-C (electronically signed)           Keiry Mka PA-C  03/02/23 1938

## 2023-03-02 NOTE — ED NOTES
Patient ambulated within the department, reading of 91% on room air.      Ayesha Coffey RN  03/02/23 1406

## 2023-03-02 NOTE — ED NOTES
3361  12 lead ECG completed, given to Dr. Aixa Armas for review     Aracelis Hernandez  03/02/23 3737

## 2023-03-02 NOTE — ED PROVIDER NOTES
ECG    The Ekg interpreted by me shows sinus rhythm with PACs and a rate of 100 bpm.  Left axis deviation. No acute injury pattern. , , QTc 500.     No significant change from prior EKG dated 9/17/2022     Dennis Wills DO  03/02/23 1450

## 2023-05-18 ENCOUNTER — APPOINTMENT (OUTPATIENT)
Dept: ULTRASOUND IMAGING | Age: 83
End: 2023-05-18
Payer: MEDICARE

## 2023-05-18 ENCOUNTER — HOSPITAL ENCOUNTER (EMERGENCY)
Age: 83
Discharge: ANOTHER HOSPITAL | End: 2023-05-18
Attending: EMERGENCY MEDICINE
Payer: MEDICARE

## 2023-05-18 ENCOUNTER — APPOINTMENT (OUTPATIENT)
Dept: CT IMAGING | Age: 83
End: 2023-05-18
Payer: MEDICARE

## 2023-05-18 VITALS
SYSTOLIC BLOOD PRESSURE: 196 MMHG | TEMPERATURE: 97.3 F | WEIGHT: 104.2 LBS | HEIGHT: 60 IN | OXYGEN SATURATION: 99 % | HEART RATE: 91 BPM | BODY MASS INDEX: 20.46 KG/M2 | RESPIRATION RATE: 13 BRPM | DIASTOLIC BLOOD PRESSURE: 94 MMHG

## 2023-05-18 DIAGNOSIS — K86.9 PANCREATIC LESION: ICD-10-CM

## 2023-05-18 DIAGNOSIS — R74.01 TRANSAMINITIS: Primary | ICD-10-CM

## 2023-05-18 LAB
ALBUMIN SERPL-MCNC: 3.8 G/DL (ref 3.4–5)
ALBUMIN/GLOB SERPL: 1.1 {RATIO} (ref 1.1–2.2)
ALP SERPL-CCNC: 1490 U/L (ref 40–129)
ALT SERPL-CCNC: 291 U/L (ref 10–40)
ANION GAP SERPL CALCULATED.3IONS-SCNC: 11 MMOL/L (ref 3–16)
AST SERPL-CCNC: 282 U/L (ref 15–37)
BACTERIA URNS QL MICRO: ABNORMAL /HPF
BASOPHILS # BLD: 0.1 K/UL (ref 0–0.2)
BASOPHILS NFR BLD: 1.6 %
BILIRUB SERPL-MCNC: 11.6 MG/DL (ref 0–1)
BILIRUB UR QL STRIP.AUTO: ABNORMAL
BUN SERPL-MCNC: 22 MG/DL (ref 7–20)
CALCIUM SERPL-MCNC: 9.9 MG/DL (ref 8.3–10.6)
CHLORIDE SERPL-SCNC: 102 MMOL/L (ref 99–110)
CHOLEST SERPL-MCNC: 213 MG/DL (ref 0–199)
CLARITY UR: CLEAR
CO2 SERPL-SCNC: 26 MMOL/L (ref 21–32)
COLOR UR: YELLOW
CREAT SERPL-MCNC: 0.7 MG/DL (ref 0.6–1.2)
CRYSTALS URNS MICRO: ABNORMAL /HPF
DEPRECATED RDW RBC AUTO: 16.6 % (ref 12.4–15.4)
EOSINOPHIL # BLD: 0.5 K/UL (ref 0–0.6)
EOSINOPHIL NFR BLD: 7.1 %
EPI CELLS #/AREA URNS HPF: ABNORMAL /HPF (ref 0–5)
GFR SERPLBLD CREATININE-BSD FMLA CKD-EPI: >60 ML/MIN/{1.73_M2}
GLUCOSE SERPL-MCNC: 101 MG/DL (ref 70–99)
GLUCOSE UR STRIP.AUTO-MCNC: 100 MG/DL
HCT VFR BLD AUTO: 39 % (ref 36–48)
HDLC SERPL-MCNC: 74 MG/DL (ref 40–60)
HGB BLD-MCNC: 12.9 G/DL (ref 12–16)
HGB UR QL STRIP.AUTO: NEGATIVE
HYALINE CASTS #/AREA URNS LPF: ABNORMAL /LPF (ref 0–2)
KETONES UR STRIP.AUTO-MCNC: NEGATIVE MG/DL
LDLC SERPL CALC-MCNC: 127 MG/DL
LEUKOCYTE ESTERASE UR QL STRIP.AUTO: ABNORMAL
LIPASE SERPL-CCNC: 61 U/L (ref 13–60)
LYMPHOCYTES # BLD: 1.1 K/UL (ref 1–5.1)
LYMPHOCYTES NFR BLD: 15.9 %
MCH RBC QN AUTO: 30.3 PG (ref 26–34)
MCHC RBC AUTO-ENTMCNC: 33.1 G/DL (ref 31–36)
MCV RBC AUTO: 91.6 FL (ref 80–100)
MONOCYTES # BLD: 0.8 K/UL (ref 0–1.3)
MONOCYTES NFR BLD: 11.1 %
MUCOUS THREADS #/AREA URNS LPF: ABNORMAL /LPF
NEUTROPHILS # BLD: 4.5 K/UL (ref 1.7–7.7)
NEUTROPHILS NFR BLD: 64.3 %
NITRITE UR QL STRIP.AUTO: NEGATIVE
PH UR STRIP.AUTO: 5.5 [PH] (ref 5–8)
PHOSPHATE SERPL-MCNC: 3.3 MG/DL (ref 2.5–4.9)
PLATELET # BLD AUTO: 214 K/UL (ref 135–450)
PMV BLD AUTO: 10.1 FL (ref 5–10.5)
POTASSIUM SERPL-SCNC: 3.6 MMOL/L (ref 3.5–5.1)
PROT SERPL-MCNC: 7.2 G/DL (ref 6.4–8.2)
PROT UR STRIP.AUTO-MCNC: 30 MG/DL
RBC # BLD AUTO: 4.26 M/UL (ref 4–5.2)
RBC #/AREA URNS HPF: ABNORMAL /HPF (ref 0–4)
SODIUM SERPL-SCNC: 139 MMOL/L (ref 136–145)
SP GR UR STRIP.AUTO: 1.02 (ref 1–1.03)
TRIGL SERPL-MCNC: 60 MG/DL (ref 0–150)
UA COMPLETE W REFLEX CULTURE PNL UR: ABNORMAL
UA DIPSTICK W REFLEX MICRO PNL UR: YES
URN SPEC COLLECT METH UR: ABNORMAL
UROBILINOGEN UR STRIP-ACNC: 1 E.U./DL
VLDLC SERPL CALC-MCNC: 12 MG/DL
WBC # BLD AUTO: 7 K/UL (ref 4–11)
WBC #/AREA URNS HPF: ABNORMAL /HPF (ref 0–5)

## 2023-05-18 PROCEDURE — 84100 ASSAY OF PHOSPHORUS: CPT

## 2023-05-18 PROCEDURE — 99285 EMERGENCY DEPT VISIT HI MDM: CPT

## 2023-05-18 PROCEDURE — 80061 LIPID PANEL: CPT

## 2023-05-18 PROCEDURE — 85025 COMPLETE CBC W/AUTO DIFF WBC: CPT

## 2023-05-18 PROCEDURE — 80053 COMPREHEN METABOLIC PANEL: CPT

## 2023-05-18 PROCEDURE — 76705 ECHO EXAM OF ABDOMEN: CPT

## 2023-05-18 PROCEDURE — 36415 COLL VENOUS BLD VENIPUNCTURE: CPT

## 2023-05-18 PROCEDURE — 83690 ASSAY OF LIPASE: CPT

## 2023-05-18 PROCEDURE — 81001 URINALYSIS AUTO W/SCOPE: CPT

## 2023-05-18 PROCEDURE — 6360000004 HC RX CONTRAST MEDICATION: Performed by: NURSE PRACTITIONER

## 2023-05-18 PROCEDURE — 74177 CT ABD & PELVIS W/CONTRAST: CPT

## 2023-05-18 RX ADMIN — IOPAMIDOL 75 ML: 755 INJECTION, SOLUTION INTRAVENOUS at 13:24

## 2023-05-18 ASSESSMENT — PAIN - FUNCTIONAL ASSESSMENT: PAIN_FUNCTIONAL_ASSESSMENT: NONE - DENIES PAIN

## 2023-05-18 ASSESSMENT — ENCOUNTER SYMPTOMS
CHOKING: 0
APNEA: 0
EYE DISCHARGE: 0
BACK PAIN: 1
COUGH: 0
COLOR CHANGE: 1
EYE REDNESS: 0
ROS SKIN COMMENTS: JAUNDICED
FACIAL SWELLING: 0
SHORTNESS OF BREATH: 0
VOMITING: 0
NAUSEA: 0
ABDOMINAL PAIN: 0

## 2023-05-18 NOTE — ED NOTES
[facility] for a consult with [facility].       Alexx Matthew  05/18/23 6036

## 2023-05-18 NOTE — ED PROVIDER NOTES
[address] ED  EMERGENCY DEPARTMENT ENCOUNTER        Pt Name: Emerson Schaefer  MRN: [id number]  [place] 1940  Date of evaluation: 2023  Provider: AGAPITO Pedersen CNP  PCP: AGAPITO Santillan CNP  Note Started: 2:00 PM EDT 23       I have seen and evaluated this patient with my supervising physician Denis Moy MD.      [address] [place] [address]       Chief Complaint   Patient presents with    Jaundice     Patient states that family has noticed that she looks jaundice x 2 days and has had dark urine x 4 days. Patient denies any other complaints at this time. Patient is noted to be jaundice with yellowing of the sclera during triage. HISTORY OF PRESENT ILLNESS: 1 or more Elements     History From: patient     Limitations to history : None    Social Determinants Significantly Affecting Health : None    Chief Complaint:jaundiced x2 days     Emerson Schaefer is a 81 y.o. female who presents to the emergency room with jaundice for 2 days. She states she was at her dentist who actually commented on this to her yesterday. She states her daughter did not really notice it yesterday but today she did. She has never had this before. She states she has no other symptoms. She denies abdominal pain, nausea vomit vomiting or diarrhea. She does have chronic low back pain that is off and on and feels a little worse today more than normal.  She denies any fevers or chills  She denies any alcohol use but states she used to drink years ago. She states once in a great while she will \"do pot. \"  She states she only started that because her sister was living with her for a time while she was dealing with cancer and she used to smoke medical marijuana. She denies any past medical history of hepatitis. She does not currently do any other drugs. She is a non-smoker and quit on 2019 after her sister  of cancer.      The patient states she is never seen a GI doctor

## 2023-05-19 ENCOUNTER — ANESTHESIA (OUTPATIENT)
Dept: ENDOSCOPY | Age: 83
End: 2023-05-19
Payer: MEDICARE

## 2023-05-19 ENCOUNTER — ANESTHESIA EVENT (OUTPATIENT)
Dept: ENDOSCOPY | Age: 83
End: 2023-05-19
Payer: MEDICARE

## 2023-05-19 ENCOUNTER — APPOINTMENT (OUTPATIENT)
Dept: VASCULAR LAB | Age: 83
DRG: 438 | End: 2023-05-19
Attending: SURGERY
Payer: MEDICARE

## 2023-05-19 ENCOUNTER — APPOINTMENT (OUTPATIENT)
Dept: GENERAL RADIOLOGY | Age: 83
DRG: 438 | End: 2023-05-19
Attending: SURGERY
Payer: MEDICARE

## 2023-05-19 ENCOUNTER — HOSPITAL ENCOUNTER (INPATIENT)
Age: 83
LOS: 2 days | Discharge: HOME | DRG: 438 | End: 2023-05-21
Attending: SURGERY | Admitting: SURGERY
Payer: MEDICARE

## 2023-05-19 LAB
ABO + RH BLD: NORMAL
ALBUMIN SERPL-MCNC: 3.1 G/DL (ref 3.4–5)
ALP SERPL-CCNC: 1580 U/L (ref 40–129)
ALT SERPL-CCNC: 268 U/L (ref 10–40)
ANION GAP SERPL CALCULATED.3IONS-SCNC: 10 MMOL/L (ref 3–16)
AST SERPL-CCNC: 271 U/L (ref 15–37)
BASOPHILS # BLD: 0.2 K/UL (ref 0–0.2)
BASOPHILS NFR BLD: 3.6 %
BILIRUB DIRECT SERPL-MCNC: 9 MG/DL (ref 0–0.3)
BILIRUB INDIRECT SERPL-MCNC: 1.2 MG/DL (ref 0–1)
BILIRUB SERPL-MCNC: 10.2 MG/DL (ref 0–1)
BLD GP AB SCN SERPL QL: NORMAL
BUN SERPL-MCNC: 15 MG/DL (ref 7–20)
CALCIUM SERPL-MCNC: 9.2 MG/DL (ref 8.3–10.6)
CHLORIDE SERPL-SCNC: 105 MMOL/L (ref 99–110)
CO2 SERPL-SCNC: 24 MMOL/L (ref 21–32)
CREAT SERPL-MCNC: <0.5 MG/DL (ref 0.6–1.2)
DEPRECATED RDW RBC AUTO: 16.3 % (ref 12.4–15.4)
EOSINOPHIL # BLD: 0.6 K/UL (ref 0–0.6)
EOSINOPHIL NFR BLD: 9.6 %
GFR SERPLBLD CREATININE-BSD FMLA CKD-EPI: >60 ML/MIN/{1.73_M2}
GLUCOSE SERPL-MCNC: 103 MG/DL (ref 70–99)
HCT VFR BLD AUTO: 38.2 % (ref 36–48)
HGB BLD-MCNC: 12.7 G/DL (ref 12–16)
INR PPP: 1.05 (ref 0.84–1.16)
LACTATE BLDV-SCNC: 0.9 MMOL/L (ref 0.4–2)
LIPASE SERPL-CCNC: 46 U/L (ref 13–60)
LYMPHOCYTES # BLD: 1.1 K/UL (ref 1–5.1)
LYMPHOCYTES NFR BLD: 17.8 %
MAGNESIUM SERPL-MCNC: 1.9 MG/DL (ref 1.8–2.4)
MCH RBC QN AUTO: 30.6 PG (ref 26–34)
MCHC RBC AUTO-ENTMCNC: 33.3 G/DL (ref 31–36)
MCV RBC AUTO: 92 FL (ref 80–100)
MONOCYTES # BLD: 0.6 K/UL (ref 0–1.3)
MONOCYTES NFR BLD: 10.2 %
NEUTROPHILS # BLD: 3.7 K/UL (ref 1.7–7.7)
NEUTROPHILS NFR BLD: 58.8 %
PHOSPHATE SERPL-MCNC: 3.2 MG/DL (ref 2.5–4.9)
PLATELET # BLD AUTO: 205 K/UL (ref 135–450)
PMV BLD AUTO: 9.2 FL (ref 5–10.5)
POTASSIUM SERPL-SCNC: 3.3 MMOL/L (ref 3.5–5.1)
PROT SERPL-MCNC: 6.4 G/DL (ref 6.4–8.2)
PROTHROMBIN TIME: 13.7 SEC (ref 11.5–14.8)
RBC # BLD AUTO: 4.15 M/UL (ref 4–5.2)
SODIUM SERPL-SCNC: 139 MMOL/L (ref 136–145)
WBC # BLD AUTO: 6.3 K/UL (ref 4–11)

## 2023-05-19 PROCEDURE — 93880 EXTRACRANIAL BILAT STUDY: CPT

## 2023-05-19 PROCEDURE — 80069 RENAL FUNCTION PANEL: CPT

## 2023-05-19 PROCEDURE — 3700000001 HC ADD 15 MINUTES (ANESTHESIA): Performed by: INTERNAL MEDICINE

## 2023-05-19 PROCEDURE — 36415 COLL VENOUS BLD VENIPUNCTURE: CPT

## 2023-05-19 PROCEDURE — 6370000000 HC RX 637 (ALT 250 FOR IP): Performed by: INTERNAL MEDICINE

## 2023-05-19 PROCEDURE — 71045 X-RAY EXAM CHEST 1 VIEW: CPT

## 2023-05-19 PROCEDURE — 85025 COMPLETE CBC W/AUTO DIFF WBC: CPT

## 2023-05-19 PROCEDURE — C1874 STENT, COATED/COV W/DEL SYS: HCPCS | Performed by: INTERNAL MEDICINE

## 2023-05-19 PROCEDURE — 3700000000 HC ANESTHESIA ATTENDED CARE: Performed by: INTERNAL MEDICINE

## 2023-05-19 PROCEDURE — 86301 IMMUNOASSAY TUMOR CA 19-9: CPT

## 2023-05-19 PROCEDURE — 6360000002 HC RX W HCPCS: Performed by: INTERNAL MEDICINE

## 2023-05-19 PROCEDURE — 6360000004 HC RX CONTRAST MEDICATION: Performed by: INTERNAL MEDICINE

## 2023-05-19 PROCEDURE — 86850 RBC ANTIBODY SCREEN: CPT

## 2023-05-19 PROCEDURE — BF121ZZ FLUOROSCOPY OF GALLBLADDER USING LOW OSMOLAR CONTRAST: ICD-10-PCS | Performed by: INTERNAL MEDICINE

## 2023-05-19 PROCEDURE — 88172 CYTP DX EVAL FNA 1ST EA SITE: CPT

## 2023-05-19 PROCEDURE — 1200000000 HC SEMI PRIVATE

## 2023-05-19 PROCEDURE — 2580000003 HC RX 258

## 2023-05-19 PROCEDURE — 6360000002 HC RX W HCPCS: Performed by: NURSE PRACTITIONER

## 2023-05-19 PROCEDURE — 7100000000 HC PACU RECOVERY - FIRST 15 MIN: Performed by: INTERNAL MEDICINE

## 2023-05-19 PROCEDURE — 6360000002 HC RX W HCPCS: Performed by: STUDENT IN AN ORGANIZED HEALTH CARE EDUCATION/TRAINING PROGRAM

## 2023-05-19 PROCEDURE — C9113 INJ PANTOPRAZOLE SODIUM, VIA: HCPCS | Performed by: STUDENT IN AN ORGANIZED HEALTH CARE EDUCATION/TRAINING PROGRAM

## 2023-05-19 PROCEDURE — 2580000003 HC RX 258: Performed by: STUDENT IN AN ORGANIZED HEALTH CARE EDUCATION/TRAINING PROGRAM

## 2023-05-19 PROCEDURE — C2617 STENT, NON-COR, TEM W/O DEL: HCPCS | Performed by: INTERNAL MEDICINE

## 2023-05-19 PROCEDURE — 88173 CYTOPATH EVAL FNA REPORT: CPT

## 2023-05-19 PROCEDURE — 74330 X-RAY BILE/PANC ENDOSCOPY: CPT

## 2023-05-19 PROCEDURE — 3609020800 HC EGD W/EUS FNA: Performed by: INTERNAL MEDICINE

## 2023-05-19 PROCEDURE — 2500000003 HC RX 250 WO HCPCS: Performed by: INTERNAL MEDICINE

## 2023-05-19 PROCEDURE — 86901 BLOOD TYPING SEROLOGIC RH(D): CPT

## 2023-05-19 PROCEDURE — 88177 CYTP FNA EVAL EA ADDL: CPT

## 2023-05-19 PROCEDURE — 2500000003 HC RX 250 WO HCPCS: Performed by: STUDENT IN AN ORGANIZED HEALTH CARE EDUCATION/TRAINING PROGRAM

## 2023-05-19 PROCEDURE — 94761 N-INVAS EAR/PLS OXIMETRY MLT: CPT

## 2023-05-19 PROCEDURE — 0F758ZZ DILATION OF RIGHT HEPATIC DUCT, VIA NATURAL OR ARTIFICIAL OPENING ENDOSCOPIC: ICD-10-PCS | Performed by: INTERNAL MEDICINE

## 2023-05-19 PROCEDURE — 83605 ASSAY OF LACTIC ACID: CPT

## 2023-05-19 PROCEDURE — 6370000000 HC RX 637 (ALT 250 FOR IP): Performed by: STUDENT IN AN ORGANIZED HEALTH CARE EDUCATION/TRAINING PROGRAM

## 2023-05-19 PROCEDURE — 2720000010 HC SURG SUPPLY STERILE: Performed by: INTERNAL MEDICINE

## 2023-05-19 PROCEDURE — 94640 AIRWAY INHALATION TREATMENT: CPT

## 2023-05-19 PROCEDURE — 0F7D8DZ DILATION OF PANCREATIC DUCT WITH INTRALUMINAL DEVICE, VIA NATURAL OR ARTIFICIAL OPENING ENDOSCOPIC: ICD-10-PCS | Performed by: INTERNAL MEDICINE

## 2023-05-19 PROCEDURE — 83735 ASSAY OF MAGNESIUM: CPT

## 2023-05-19 PROCEDURE — 2709999900 HC NON-CHARGEABLE SUPPLY: Performed by: INTERNAL MEDICINE

## 2023-05-19 PROCEDURE — 2500000003 HC RX 250 WO HCPCS

## 2023-05-19 PROCEDURE — 7100000001 HC PACU RECOVERY - ADDTL 15 MIN: Performed by: INTERNAL MEDICINE

## 2023-05-19 PROCEDURE — 80076 HEPATIC FUNCTION PANEL: CPT

## 2023-05-19 PROCEDURE — 6360000002 HC RX W HCPCS

## 2023-05-19 PROCEDURE — 3609018800 HC ERCP DX COLLECTION SPECIMEN BRUSHING/WASHING: Performed by: INTERNAL MEDICINE

## 2023-05-19 PROCEDURE — A4216 STERILE WATER/SALINE, 10 ML: HCPCS | Performed by: STUDENT IN AN ORGANIZED HEALTH CARE EDUCATION/TRAINING PROGRAM

## 2023-05-19 PROCEDURE — 85610 PROTHROMBIN TIME: CPT

## 2023-05-19 PROCEDURE — 83690 ASSAY OF LIPASE: CPT

## 2023-05-19 PROCEDURE — 88305 TISSUE EXAM BY PATHOLOGIST: CPT

## 2023-05-19 PROCEDURE — 86900 BLOOD TYPING SEROLOGIC ABO: CPT

## 2023-05-19 PROCEDURE — 94150 VITAL CAPACITY TEST: CPT

## 2023-05-19 DEVICE — STENT SYSTEM
Type: IMPLANTABLE DEVICE | Status: FUNCTIONAL
Brand: WALLFLEX™ BILIARY

## 2023-05-19 DEVICE — PANCREATIC STENT
Type: IMPLANTABLE DEVICE | Status: FUNCTIONAL
Brand: ADVANIX™ PANCREATIC STENT

## 2023-05-19 RX ORDER — ARFORMOTEROL TARTRATE 15 UG/2ML
15 SOLUTION RESPIRATORY (INHALATION) 2 TIMES DAILY
Status: DISCONTINUED | OUTPATIENT
Start: 2023-05-19 | End: 2023-05-21 | Stop reason: HOSPADM

## 2023-05-19 RX ORDER — SODIUM CHLORIDE 9 MG/ML
INJECTION, SOLUTION INTRAVENOUS PRN
Status: DISCONTINUED | OUTPATIENT
Start: 2023-05-19 | End: 2023-05-19 | Stop reason: HOSPADM

## 2023-05-19 RX ORDER — BUSPIRONE HYDROCHLORIDE 10 MG/1
5 TABLET ORAL 2 TIMES DAILY
Status: DISCONTINUED | OUTPATIENT
Start: 2023-05-19 | End: 2023-05-21 | Stop reason: HOSPADM

## 2023-05-19 RX ORDER — POTASSIUM CHLORIDE 7.45 MG/ML
10 INJECTION INTRAVENOUS
Status: DISCONTINUED | OUTPATIENT
Start: 2023-05-19 | End: 2023-05-19

## 2023-05-19 RX ORDER — ROCURONIUM BROMIDE 10 MG/ML
INJECTION, SOLUTION INTRAVENOUS PRN
Status: DISCONTINUED | OUTPATIENT
Start: 2023-05-19 | End: 2023-05-19 | Stop reason: SDUPTHER

## 2023-05-19 RX ORDER — ONDANSETRON 2 MG/ML
4 INJECTION INTRAMUSCULAR; INTRAVENOUS EVERY 6 HOURS PRN
Status: DISCONTINUED | OUTPATIENT
Start: 2023-05-19 | End: 2023-05-21 | Stop reason: HOSPADM

## 2023-05-19 RX ORDER — SODIUM CHLORIDE 0.9 % (FLUSH) 0.9 %
5-40 SYRINGE (ML) INJECTION EVERY 12 HOURS SCHEDULED
Status: DISCONTINUED | OUTPATIENT
Start: 2023-05-19 | End: 2023-05-19 | Stop reason: HOSPADM

## 2023-05-19 RX ORDER — BUDESONIDE 0.5 MG/2ML
0.5 INHALANT ORAL 2 TIMES DAILY
Status: DISCONTINUED | OUTPATIENT
Start: 2023-05-19 | End: 2023-05-21 | Stop reason: HOSPADM

## 2023-05-19 RX ORDER — SODIUM CHLORIDE 0.9 % (FLUSH) 0.9 %
10 SYRINGE (ML) INJECTION PRN
Status: DISCONTINUED | OUTPATIENT
Start: 2023-05-19 | End: 2023-05-21 | Stop reason: HOSPADM

## 2023-05-19 RX ORDER — SODIUM CHLORIDE 0.9 % (FLUSH) 0.9 %
10 SYRINGE (ML) INJECTION EVERY 12 HOURS SCHEDULED
Status: DISCONTINUED | OUTPATIENT
Start: 2023-05-19 | End: 2023-05-21 | Stop reason: HOSPADM

## 2023-05-19 RX ORDER — METOPROLOL TARTRATE 5 MG/5ML
5 INJECTION INTRAVENOUS EVERY 6 HOURS
Status: DISCONTINUED | OUTPATIENT
Start: 2023-05-19 | End: 2023-05-21 | Stop reason: HOSPADM

## 2023-05-19 RX ORDER — ATORVASTATIN CALCIUM 80 MG/1
80 TABLET, FILM COATED ORAL DAILY
Status: DISCONTINUED | OUTPATIENT
Start: 2023-05-19 | End: 2023-05-21 | Stop reason: HOSPADM

## 2023-05-19 RX ORDER — ALBUTEROL SULFATE 2.5 MG/3ML
2.5 SOLUTION RESPIRATORY (INHALATION) 2 TIMES DAILY
Status: DISCONTINUED | OUTPATIENT
Start: 2023-05-19 | End: 2023-05-21

## 2023-05-19 RX ORDER — ONDANSETRON 2 MG/ML
4 INJECTION INTRAMUSCULAR; INTRAVENOUS
Status: DISCONTINUED | OUTPATIENT
Start: 2023-05-19 | End: 2023-05-19 | Stop reason: HOSPADM

## 2023-05-19 RX ORDER — SODIUM CHLORIDE, SODIUM LACTATE, POTASSIUM CHLORIDE, CALCIUM CHLORIDE 600; 310; 30; 20 MG/100ML; MG/100ML; MG/100ML; MG/100ML
INJECTION, SOLUTION INTRAVENOUS CONTINUOUS PRN
Status: DISCONTINUED | OUTPATIENT
Start: 2023-05-19 | End: 2023-05-19 | Stop reason: SDUPTHER

## 2023-05-19 RX ORDER — FENTANYL CITRATE 50 UG/ML
25 INJECTION, SOLUTION INTRAMUSCULAR; INTRAVENOUS EVERY 5 MIN PRN
Status: DISCONTINUED | OUTPATIENT
Start: 2023-05-19 | End: 2023-05-19 | Stop reason: HOSPADM

## 2023-05-19 RX ORDER — VENLAFAXINE 75 MG/1
150 TABLET ORAL DAILY
Status: DISCONTINUED | OUTPATIENT
Start: 2023-05-19 | End: 2023-05-21 | Stop reason: HOSPADM

## 2023-05-19 RX ORDER — AMLODIPINE BESYLATE 10 MG/1
10 TABLET ORAL DAILY
Status: DISCONTINUED | OUTPATIENT
Start: 2023-05-19 | End: 2023-05-21 | Stop reason: HOSPADM

## 2023-05-19 RX ORDER — DEXAMETHASONE SODIUM PHOSPHATE 4 MG/ML
INJECTION, SOLUTION INTRA-ARTICULAR; INTRALESIONAL; INTRAMUSCULAR; INTRAVENOUS; SOFT TISSUE PRN
Status: DISCONTINUED | OUTPATIENT
Start: 2023-05-19 | End: 2023-05-19 | Stop reason: SDUPTHER

## 2023-05-19 RX ORDER — SODIUM CHLORIDE, SODIUM LACTATE, POTASSIUM CHLORIDE, CALCIUM CHLORIDE 600; 310; 30; 20 MG/100ML; MG/100ML; MG/100ML; MG/100ML
INJECTION, SOLUTION INTRAVENOUS CONTINUOUS
Status: DISCONTINUED | OUTPATIENT
Start: 2023-05-19 | End: 2023-05-19

## 2023-05-19 RX ORDER — CIPROFLOXACIN 2 MG/ML
400 INJECTION, SOLUTION INTRAVENOUS ONCE
Status: COMPLETED | OUTPATIENT
Start: 2023-05-19 | End: 2023-05-19

## 2023-05-19 RX ORDER — PROPOFOL 10 MG/ML
INJECTION, EMULSION INTRAVENOUS PRN
Status: DISCONTINUED | OUTPATIENT
Start: 2023-05-19 | End: 2023-05-19 | Stop reason: SDUPTHER

## 2023-05-19 RX ORDER — LIDOCAINE HYDROCHLORIDE 20 MG/ML
INJECTION, SOLUTION INTRAVENOUS PRN
Status: DISCONTINUED | OUTPATIENT
Start: 2023-05-19 | End: 2023-05-19 | Stop reason: SDUPTHER

## 2023-05-19 RX ORDER — DEXTROSE, SODIUM CHLORIDE, AND POTASSIUM CHLORIDE 5; .45; .15 G/100ML; G/100ML; G/100ML
INJECTION INTRAVENOUS CONTINUOUS
Status: DISCONTINUED | OUTPATIENT
Start: 2023-05-19 | End: 2023-05-21

## 2023-05-19 RX ORDER — IPRATROPIUM BROMIDE AND ALBUTEROL SULFATE 2.5; .5 MG/3ML; MG/3ML
1 SOLUTION RESPIRATORY (INHALATION)
Status: DISCONTINUED | OUTPATIENT
Start: 2023-05-19 | End: 2023-05-19 | Stop reason: HOSPADM

## 2023-05-19 RX ORDER — ONDANSETRON 4 MG/1
4 TABLET, ORALLY DISINTEGRATING ORAL EVERY 8 HOURS PRN
Status: DISCONTINUED | OUTPATIENT
Start: 2023-05-19 | End: 2023-05-21 | Stop reason: HOSPADM

## 2023-05-19 RX ORDER — ONDANSETRON 2 MG/ML
INJECTION INTRAMUSCULAR; INTRAVENOUS PRN
Status: DISCONTINUED | OUTPATIENT
Start: 2023-05-19 | End: 2023-05-19 | Stop reason: SDUPTHER

## 2023-05-19 RX ORDER — ALBUTEROL SULFATE 90 UG/1
1 AEROSOL, METERED RESPIRATORY (INHALATION) 2 TIMES DAILY
Status: DISCONTINUED | OUTPATIENT
Start: 2023-05-19 | End: 2023-05-19

## 2023-05-19 RX ORDER — PROCHLORPERAZINE EDISYLATE 5 MG/ML
5 INJECTION INTRAMUSCULAR; INTRAVENOUS
Status: DISCONTINUED | OUTPATIENT
Start: 2023-05-19 | End: 2023-05-19 | Stop reason: HOSPADM

## 2023-05-19 RX ORDER — METOPROLOL TARTRATE 50 MG/1
50 TABLET, FILM COATED ORAL 2 TIMES DAILY
Status: DISCONTINUED | OUTPATIENT
Start: 2023-05-19 | End: 2023-05-19

## 2023-05-19 RX ORDER — POTASSIUM CHLORIDE 7.45 MG/ML
10 INJECTION INTRAVENOUS
Status: DISCONTINUED | OUTPATIENT
Start: 2023-05-19 | End: 2023-05-19 | Stop reason: SDUPTHER

## 2023-05-19 RX ORDER — SODIUM CHLORIDE 9 MG/ML
INJECTION, SOLUTION INTRAVENOUS PRN
Status: DISCONTINUED | OUTPATIENT
Start: 2023-05-19 | End: 2023-05-21 | Stop reason: HOSPADM

## 2023-05-19 RX ORDER — MAGNESIUM SULFATE IN WATER 40 MG/ML
2000 INJECTION, SOLUTION INTRAVENOUS ONCE
Status: COMPLETED | OUTPATIENT
Start: 2023-05-19 | End: 2023-05-19

## 2023-05-19 RX ORDER — SODIUM CHLORIDE 0.9 % (FLUSH) 0.9 %
5-40 SYRINGE (ML) INJECTION PRN
Status: DISCONTINUED | OUTPATIENT
Start: 2023-05-19 | End: 2023-05-19 | Stop reason: HOSPADM

## 2023-05-19 RX ORDER — ENOXAPARIN SODIUM 100 MG/ML
40 INJECTION SUBCUTANEOUS DAILY
Status: DISCONTINUED | OUTPATIENT
Start: 2023-05-19 | End: 2023-05-21 | Stop reason: HOSPADM

## 2023-05-19 RX ADMIN — SODIUM CHLORIDE, POTASSIUM CHLORIDE, SODIUM LACTATE AND CALCIUM CHLORIDE: 600; 310; 30; 20 INJECTION, SOLUTION INTRAVENOUS at 01:25

## 2023-05-19 RX ADMIN — HYDROMORPHONE HYDROCHLORIDE 0.5 MG: 1 INJECTION, SOLUTION INTRAMUSCULAR; INTRAVENOUS; SUBCUTANEOUS at 22:31

## 2023-05-19 RX ADMIN — ROCURONIUM BROMIDE 40 MG: 10 INJECTION INTRAVENOUS at 13:14

## 2023-05-19 RX ADMIN — CIPROFLOXACIN 400 MG: 400 INJECTION, SOLUTION INTRAVENOUS at 12:40

## 2023-05-19 RX ADMIN — METOPROLOL TARTRATE 5 MG: 5 INJECTION INTRAVENOUS at 17:21

## 2023-05-19 RX ADMIN — ROCURONIUM BROMIDE 30 MG: 10 INJECTION INTRAVENOUS at 12:33

## 2023-05-19 RX ADMIN — POTASSIUM CHLORIDE, DEXTROSE MONOHYDRATE AND SODIUM CHLORIDE: 150; 5; 450 INJECTION, SOLUTION INTRAVENOUS at 19:33

## 2023-05-19 RX ADMIN — SODIUM CHLORIDE, PRESERVATIVE FREE 40 MG: 5 INJECTION INTRAVENOUS at 10:25

## 2023-05-19 RX ADMIN — AMLODIPINE BESYLATE 10 MG: 10 TABLET ORAL at 20:47

## 2023-05-19 RX ADMIN — BUSPIRONE HYDROCHLORIDE 5 MG: 10 TABLET ORAL at 10:26

## 2023-05-19 RX ADMIN — BUSPIRONE HYDROCHLORIDE 5 MG: 10 TABLET ORAL at 20:47

## 2023-05-19 RX ADMIN — HYDROMORPHONE HYDROCHLORIDE 0.5 MG: 1 INJECTION, SOLUTION INTRAMUSCULAR; INTRAVENOUS; SUBCUTANEOUS at 09:16

## 2023-05-19 RX ADMIN — MAGNESIUM SULFATE HEPTAHYDRATE 2000 MG: 40 INJECTION, SOLUTION INTRAVENOUS at 10:39

## 2023-05-19 RX ADMIN — DEXAMETHASONE SODIUM PHOSPHATE 4 MG: 4 INJECTION, SOLUTION INTRAMUSCULAR; INTRAVENOUS at 12:40

## 2023-05-19 RX ADMIN — LIDOCAINE HYDROCHLORIDE 40 MG: 20 INJECTION, SOLUTION INTRAVENOUS at 12:30

## 2023-05-19 RX ADMIN — BUDESONIDE INHALATION 500 MCG: 0.5 SUSPENSION RESPIRATORY (INHALATION) at 09:06

## 2023-05-19 RX ADMIN — POTASSIUM CHLORIDE 10 MEQ: 10 INJECTION, SOLUTION INTRAVENOUS at 10:43

## 2023-05-19 RX ADMIN — VENLAFAXINE HYDROCHLORIDE 150 MG: 75 TABLET ORAL at 10:26

## 2023-05-19 RX ADMIN — INDOMETHACIN 100 MG: 50 SUPPOSITORY RECTAL at 13:50

## 2023-05-19 RX ADMIN — METOPROLOL TARTRATE 5 MG: 5 INJECTION INTRAVENOUS at 22:54

## 2023-05-19 RX ADMIN — ATORVASTATIN CALCIUM 80 MG: 80 TABLET, FILM COATED ORAL at 10:26

## 2023-05-19 RX ADMIN — SODIUM CHLORIDE, SODIUM LACTATE, POTASSIUM CHLORIDE, AND CALCIUM CHLORIDE: .6; .31; .03; .02 INJECTION, SOLUTION INTRAVENOUS at 12:24

## 2023-05-19 RX ADMIN — PROPOFOL 100 MG: 10 INJECTION, EMULSION INTRAVENOUS at 12:31

## 2023-05-19 RX ADMIN — HYDROMORPHONE HYDROCHLORIDE 0.5 MG: 1 INJECTION, SOLUTION INTRAMUSCULAR; INTRAVENOUS; SUBCUTANEOUS at 03:28

## 2023-05-19 RX ADMIN — METOPROLOL TARTRATE 5 MG: 5 INJECTION INTRAVENOUS at 06:56

## 2023-05-19 RX ADMIN — ONDANSETRON 4 MG: 2 INJECTION INTRAMUSCULAR; INTRAVENOUS at 12:40

## 2023-05-19 RX ADMIN — ALBUTEROL SULFATE 2.5 MG: 2.5 SOLUTION RESPIRATORY (INHALATION) at 09:06

## 2023-05-19 RX ADMIN — IOPAMIDOL 50 ML: 612 INJECTION, SOLUTION INTRAVENOUS at 13:48

## 2023-05-19 RX ADMIN — POTASSIUM CHLORIDE, DEXTROSE MONOHYDRATE AND SODIUM CHLORIDE: 150; 5; 450 INJECTION, SOLUTION INTRAVENOUS at 07:00

## 2023-05-19 RX ADMIN — METOPROLOL TARTRATE 5 MG: 5 INJECTION INTRAVENOUS at 01:35

## 2023-05-19 RX ADMIN — ARFORMOTEROL TARTRATE 15 MCG: 15 SOLUTION RESPIRATORY (INHALATION) at 09:06

## 2023-05-19 ASSESSMENT — PAIN - FUNCTIONAL ASSESSMENT
PAIN_FUNCTIONAL_ASSESSMENT: ACTIVITIES ARE NOT PREVENTED
PAIN_FUNCTIONAL_ASSESSMENT: ACTIVITIES ARE NOT PREVENTED
PAIN_FUNCTIONAL_ASSESSMENT: 0-10

## 2023-05-19 ASSESSMENT — PAIN SCALES - GENERAL
PAINLEVEL_OUTOF10: 7
PAINLEVEL_OUTOF10: 0
PAINLEVEL_OUTOF10: 9
PAINLEVEL_OUTOF10: 6

## 2023-05-19 ASSESSMENT — PAIN DESCRIPTION - DESCRIPTORS
DESCRIPTORS: ACHING
DESCRIPTORS: ACHING;OTHER (COMMENT)

## 2023-05-19 ASSESSMENT — PAIN DESCRIPTION - LOCATION
LOCATION: BACK

## 2023-05-19 ASSESSMENT — PAIN DESCRIPTION - ORIENTATION
ORIENTATION: LEFT

## 2023-05-19 NOTE — ANESTHESIA PRE PROCEDURE
Department of Anesthesiology  Preprocedure Note       Name:  Suzi Oneal   Age:  81 y.o.  :  1940                                          MRN:  [id number]         Date:  2023      Surgeon: Lashawn Falcon):  Gato Denis MD    Procedure: Procedure(s):  ERCP DIAGNOSTIC  EGD W/EUS FNA    Medications prior to admission:   Prior to Admission medications    Medication Sig Start Date End Date Taking?  Authorizing Provider   albuterol sulfate HFA (PROVENTIL HFA) 108 (90 Base) MCG/ACT inhaler Inhale 2 puffs into the lungs every 6 hours as needed for Wheezing or Shortness of Breath 3/2/23   Arthurine ADRIENNE Olvera   losartan-hydroCHLOROthiazide (HYZAAR) 100-25 MG per tablet Take 0.5 tablets by mouth daily 22   Joanell Merlin, MD   amLODIPine (NORVASC) 10 MG tablet Take 1 tablet by mouth daily Takes in evening    Historical Provider, MD   busPIRone (BUSPAR) 5 MG tablet Take 1 tablet by mouth 2 times daily    Historical Provider, MD   Cholecalciferol (VITAMIN D) 10 MCG (400 UNIT) CAPS Capsule Take 1 capsule by mouth daily    Historical Provider, MD   hydrOXYzine HCl (ATARAX) 25 MG tablet Take 1 tablet by mouth 2 times daily as needed for Itching    Historical Provider, MD   pantoprazole (PROTONIX) 40 MG tablet Take 1 tablet by mouth daily    Historical Provider, MD   Albuterol Sulfate (PROAIR HFA IN) Inhale 90 mcg into the lungs 2 times daily    Historical Provider, MD   b complex vitamins capsule Take 1 capsule by mouth daily  Patient not taking: Reported on 2023    Historical Provider, MD   vitamin C (ASCORBIC ACID) 500 MG tablet Take 1 tablet by mouth daily    Historical Provider, MD   vitamin E 400 UNIT capsule Take 1 capsule by mouth daily    Historical Provider, MD   naproxen (NAPROSYN) 500 MG tablet Take 1 tablet by mouth 2 times daily (with meals) 22  Berto Nathan MD   donepezil (ARICEPT) 5 MG tablet Take 1 tablet by mouth daily At night 21   Historical Provider, MD

## 2023-05-19 NOTE — ANESTHESIA POSTPROCEDURE EVALUATION
Department of Anesthesiology  Postprocedure Note    Patient: Ana Paula Craven  MRN: [id number]  YOB: 1940  Date of evaluation: 5/19/2023      Procedure Summary     Date: 05/19/23 Room / Location: [facility]    Anesthesia Start: 1224 Anesthesia Stop: 1427    Procedures:       ERCP DIAGNOSTIC      EGD W/EUS FNA Diagnosis:       Jaundice      Mass of head of pancreas      (pancreatic head mass)      (jaundice)    Surgeons: Richard Abraham MD Responsible Provider: Sang Paige MD    Anesthesia Type: general ASA Status: 3          Anesthesia Type: No value filed.     Nancy Phase I: Nancy Score: 8    Nancy Phase II:        Anesthesia Post Evaluation    Patient location during evaluation: PACU  Patient participation: complete - patient participated  Level of consciousness: awake and alert  Pain score: 0  Airway patency: patent  Nausea & Vomiting: no nausea and no vomiting  Complications: no  Cardiovascular status: hemodynamically stable  Respiratory status: acceptable  Hydration status: euvolemic

## 2023-05-20 ENCOUNTER — APPOINTMENT (OUTPATIENT)
Dept: CT IMAGING | Age: 83
DRG: 438 | End: 2023-05-20
Attending: SURGERY
Payer: MEDICARE

## 2023-05-20 PROBLEM — C25.9 PANCREATIC ADENOCARCINOMA (HCC): Status: ACTIVE | Noted: 2023-05-20

## 2023-05-20 PROBLEM — K86.89 PANCREATIC MASS: Status: ACTIVE | Noted: 2023-05-20

## 2023-05-20 LAB
ALBUMIN SERPL-MCNC: 3.3 G/DL (ref 3.4–5)
ALBUMIN SERPL-MCNC: 3.3 G/DL (ref 3.4–5)
ALP SERPL-CCNC: 1491 U/L (ref 40–129)
ALT SERPL-CCNC: 255 U/L (ref 10–40)
ANION GAP SERPL CALCULATED.3IONS-SCNC: 7 MMOL/L (ref 3–16)
AST SERPL-CCNC: 220 U/L (ref 15–37)
BASOPHILS # BLD: 0 K/UL (ref 0–0.2)
BASOPHILS NFR BLD: 0.4 %
BILIRUB DIRECT SERPL-MCNC: 3.1 MG/DL (ref 0–0.3)
BILIRUB INDIRECT SERPL-MCNC: 1.3 MG/DL (ref 0–1)
BILIRUB SERPL-MCNC: 4.4 MG/DL (ref 0–1)
BUN SERPL-MCNC: 11 MG/DL (ref 7–20)
CALCIUM SERPL-MCNC: 8.6 MG/DL (ref 8.3–10.6)
CHLORIDE SERPL-SCNC: 103 MMOL/L (ref 99–110)
CO2 SERPL-SCNC: 25 MMOL/L (ref 21–32)
CREAT SERPL-MCNC: 0.6 MG/DL (ref 0.6–1.2)
DEPRECATED RDW RBC AUTO: 16.3 % (ref 12.4–15.4)
EOSINOPHIL # BLD: 0 K/UL (ref 0–0.6)
EOSINOPHIL NFR BLD: 0.2 %
GFR SERPLBLD CREATININE-BSD FMLA CKD-EPI: >60 ML/MIN/{1.73_M2}
GLUCOSE SERPL-MCNC: 145 MG/DL (ref 70–99)
HCT VFR BLD AUTO: 36.2 % (ref 36–48)
HGB BLD-MCNC: 11.8 G/DL (ref 12–16)
LIPASE SERPL-CCNC: 67 U/L (ref 13–60)
LYMPHOCYTES # BLD: 0.7 K/UL (ref 1–5.1)
LYMPHOCYTES NFR BLD: 9.9 %
MAGNESIUM SERPL-MCNC: 1.9 MG/DL (ref 1.8–2.4)
MCH RBC QN AUTO: 30.2 PG (ref 26–34)
MCHC RBC AUTO-ENTMCNC: 32.7 G/DL (ref 31–36)
MCV RBC AUTO: 92.3 FL (ref 80–100)
MONOCYTES # BLD: 0.5 K/UL (ref 0–1.3)
MONOCYTES NFR BLD: 7.4 %
NEUTROPHILS # BLD: 6 K/UL (ref 1.7–7.7)
NEUTROPHILS NFR BLD: 82.1 %
PHOSPHATE SERPL-MCNC: 2.5 MG/DL (ref 2.5–4.9)
PLATELET # BLD AUTO: 212 K/UL (ref 135–450)
PMV BLD AUTO: 9.6 FL (ref 5–10.5)
POTASSIUM SERPL-SCNC: 3.8 MMOL/L (ref 3.5–5.1)
PROT SERPL-MCNC: 6.5 G/DL (ref 6.4–8.2)
RBC # BLD AUTO: 3.92 M/UL (ref 4–5.2)
SODIUM SERPL-SCNC: 135 MMOL/L (ref 136–145)
WBC # BLD AUTO: 7.3 K/UL (ref 4–11)

## 2023-05-20 PROCEDURE — 2580000003 HC RX 258

## 2023-05-20 PROCEDURE — 83690 ASSAY OF LIPASE: CPT

## 2023-05-20 PROCEDURE — 71260 CT THORAX DX C+: CPT

## 2023-05-20 PROCEDURE — 80076 HEPATIC FUNCTION PANEL: CPT

## 2023-05-20 PROCEDURE — 36415 COLL VENOUS BLD VENIPUNCTURE: CPT

## 2023-05-20 PROCEDURE — 74170 CT ABD WO CNTRST FLWD CNTRST: CPT

## 2023-05-20 PROCEDURE — 2580000003 HC RX 258: Performed by: STUDENT IN AN ORGANIZED HEALTH CARE EDUCATION/TRAINING PROGRAM

## 2023-05-20 PROCEDURE — 6360000004 HC RX CONTRAST MEDICATION: Performed by: STUDENT IN AN ORGANIZED HEALTH CARE EDUCATION/TRAINING PROGRAM

## 2023-05-20 PROCEDURE — 83735 ASSAY OF MAGNESIUM: CPT

## 2023-05-20 PROCEDURE — 85025 COMPLETE CBC W/AUTO DIFF WBC: CPT

## 2023-05-20 PROCEDURE — 2500000003 HC RX 250 WO HCPCS: Performed by: INTERNAL MEDICINE

## 2023-05-20 PROCEDURE — 2500000003 HC RX 250 WO HCPCS

## 2023-05-20 PROCEDURE — C9113 INJ PANTOPRAZOLE SODIUM, VIA: HCPCS | Performed by: STUDENT IN AN ORGANIZED HEALTH CARE EDUCATION/TRAINING PROGRAM

## 2023-05-20 PROCEDURE — 80069 RENAL FUNCTION PANEL: CPT

## 2023-05-20 PROCEDURE — 6360000002 HC RX W HCPCS: Performed by: STUDENT IN AN ORGANIZED HEALTH CARE EDUCATION/TRAINING PROGRAM

## 2023-05-20 PROCEDURE — 94761 N-INVAS EAR/PLS OXIMETRY MLT: CPT

## 2023-05-20 PROCEDURE — 2500000003 HC RX 250 WO HCPCS: Performed by: STUDENT IN AN ORGANIZED HEALTH CARE EDUCATION/TRAINING PROGRAM

## 2023-05-20 PROCEDURE — 94640 AIRWAY INHALATION TREATMENT: CPT

## 2023-05-20 PROCEDURE — 6370000000 HC RX 637 (ALT 250 FOR IP): Performed by: STUDENT IN AN ORGANIZED HEALTH CARE EDUCATION/TRAINING PROGRAM

## 2023-05-20 PROCEDURE — 1200000000 HC SEMI PRIVATE

## 2023-05-20 PROCEDURE — 99231 SBSQ HOSP IP/OBS SF/LOW 25: CPT | Performed by: SURGERY

## 2023-05-20 PROCEDURE — 6360000002 HC RX W HCPCS

## 2023-05-20 RX ORDER — HYDRALAZINE HYDROCHLORIDE 20 MG/ML
5 INJECTION INTRAMUSCULAR; INTRAVENOUS EVERY 6 HOURS PRN
Status: DISCONTINUED | OUTPATIENT
Start: 2023-05-20 | End: 2023-05-21 | Stop reason: HOSPADM

## 2023-05-20 RX ORDER — POTASSIUM CHLORIDE 7.45 MG/ML
10 INJECTION INTRAVENOUS
Status: COMPLETED | OUTPATIENT
Start: 2023-05-20 | End: 2023-05-20

## 2023-05-20 RX ORDER — MAGNESIUM SULFATE IN WATER 40 MG/ML
2000 INJECTION, SOLUTION INTRAVENOUS ONCE
Status: COMPLETED | OUTPATIENT
Start: 2023-05-20 | End: 2023-05-20

## 2023-05-20 RX ADMIN — ALBUTEROL SULFATE 2.5 MG: 2.5 SOLUTION RESPIRATORY (INHALATION) at 21:42

## 2023-05-20 RX ADMIN — METOPROLOL TARTRATE 5 MG: 5 INJECTION INTRAVENOUS at 18:38

## 2023-05-20 RX ADMIN — SODIUM CHLORIDE, PRESERVATIVE FREE 40 MG: 5 INJECTION INTRAVENOUS at 12:17

## 2023-05-20 RX ADMIN — BUDESONIDE INHALATION 500 MCG: 0.5 SUSPENSION RESPIRATORY (INHALATION) at 21:42

## 2023-05-20 RX ADMIN — METOPROLOL TARTRATE 5 MG: 5 INJECTION INTRAVENOUS at 05:42

## 2023-05-20 RX ADMIN — POTASSIUM CHLORIDE, DEXTROSE MONOHYDRATE AND SODIUM CHLORIDE: 150; 5; 450 INJECTION, SOLUTION INTRAVENOUS at 09:09

## 2023-05-20 RX ADMIN — BUDESONIDE INHALATION 500 MCG: 0.5 SUSPENSION RESPIRATORY (INHALATION) at 08:46

## 2023-05-20 RX ADMIN — BUSPIRONE HYDROCHLORIDE 5 MG: 10 TABLET ORAL at 10:06

## 2023-05-20 RX ADMIN — AMLODIPINE BESYLATE 10 MG: 10 TABLET ORAL at 10:07

## 2023-05-20 RX ADMIN — ARFORMOTEROL TARTRATE 15 MCG: 15 SOLUTION RESPIRATORY (INHALATION) at 08:46

## 2023-05-20 RX ADMIN — ENOXAPARIN SODIUM 40 MG: 100 INJECTION SUBCUTANEOUS at 10:06

## 2023-05-20 RX ADMIN — MAGNESIUM SULFATE HEPTAHYDRATE 2000 MG: 40 INJECTION, SOLUTION INTRAVENOUS at 09:18

## 2023-05-20 RX ADMIN — IOPAMIDOL 75 ML: 755 INJECTION, SOLUTION INTRAVENOUS at 15:06

## 2023-05-20 RX ADMIN — ARFORMOTEROL TARTRATE 15 MCG: 15 SOLUTION RESPIRATORY (INHALATION) at 21:42

## 2023-05-20 RX ADMIN — POTASSIUM CHLORIDE 10 MEQ: 10 INJECTION, SOLUTION INTRAVENOUS at 09:10

## 2023-05-20 RX ADMIN — BUSPIRONE HYDROCHLORIDE 5 MG: 10 TABLET ORAL at 20:30

## 2023-05-20 RX ADMIN — METOPROLOL TARTRATE 5 MG: 5 INJECTION INTRAVENOUS at 12:20

## 2023-05-20 RX ADMIN — POTASSIUM CHLORIDE, DEXTROSE MONOHYDRATE AND SODIUM CHLORIDE: 150; 5; 450 INJECTION, SOLUTION INTRAVENOUS at 21:24

## 2023-05-20 RX ADMIN — HYDROMORPHONE HYDROCHLORIDE 0.5 MG: 1 INJECTION, SOLUTION INTRAMUSCULAR; INTRAVENOUS; SUBCUTANEOUS at 14:18

## 2023-05-20 RX ADMIN — HYDRALAZINE HYDROCHLORIDE 5 MG: 20 INJECTION, SOLUTION INTRAMUSCULAR; INTRAVENOUS at 01:27

## 2023-05-20 RX ADMIN — POTASSIUM CHLORIDE 10 MEQ: 10 INJECTION, SOLUTION INTRAVENOUS at 07:58

## 2023-05-20 RX ADMIN — ATORVASTATIN CALCIUM 80 MG: 80 TABLET, FILM COATED ORAL at 10:06

## 2023-05-20 RX ADMIN — SODIUM PHOSPHATE, MONOBASIC, MONOHYDRATE AND SODIUM PHOSPHATE, DIBASIC, ANHYDROUS 10 MMOL: 142; 276 INJECTION, SOLUTION INTRAVENOUS at 11:25

## 2023-05-20 RX ADMIN — METOPROLOL TARTRATE 5 MG: 5 INJECTION INTRAVENOUS at 22:50

## 2023-05-20 RX ADMIN — SODIUM CHLORIDE, PRESERVATIVE FREE 10 ML: 5 INJECTION INTRAVENOUS at 10:08

## 2023-05-20 RX ADMIN — ALBUTEROL SULFATE 2.5 MG: 2.5 SOLUTION RESPIRATORY (INHALATION) at 08:46

## 2023-05-20 RX ADMIN — HYDROMORPHONE HYDROCHLORIDE 0.5 MG: 1 INJECTION, SOLUTION INTRAMUSCULAR; INTRAVENOUS; SUBCUTANEOUS at 05:57

## 2023-05-20 RX ADMIN — VENLAFAXINE HYDROCHLORIDE 150 MG: 75 TABLET ORAL at 10:06

## 2023-05-20 ASSESSMENT — PAIN SCALES - GENERAL
PAINLEVEL_OUTOF10: 9
PAINLEVEL_OUTOF10: 0

## 2023-05-20 ASSESSMENT — PAIN DESCRIPTION - DESCRIPTORS: DESCRIPTORS: ACHING

## 2023-05-20 ASSESSMENT — PAIN - FUNCTIONAL ASSESSMENT: PAIN_FUNCTIONAL_ASSESSMENT: PREVENTS OR INTERFERES SOME ACTIVE ACTIVITIES AND ADLS

## 2023-05-20 ASSESSMENT — PAIN DESCRIPTION - PAIN TYPE: TYPE: ACUTE PAIN

## 2023-05-20 ASSESSMENT — PAIN DESCRIPTION - LOCATION: LOCATION: HEAD

## 2023-05-20 ASSESSMENT — PAIN DESCRIPTION - FREQUENCY: FREQUENCY: INTERMITTENT

## 2023-05-20 ASSESSMENT — PAIN DESCRIPTION - ONSET: ONSET: SUDDEN

## 2023-05-21 VITALS
DIASTOLIC BLOOD PRESSURE: 63 MMHG | HEART RATE: 91 BPM | TEMPERATURE: 98.2 F | RESPIRATION RATE: 18 BRPM | SYSTOLIC BLOOD PRESSURE: 157 MMHG | OXYGEN SATURATION: 92 %

## 2023-05-21 LAB
ALBUMIN SERPL-MCNC: 3.1 G/DL (ref 3.4–5)
ALP SERPL-CCNC: 1179 U/L (ref 40–129)
ALT SERPL-CCNC: 197 U/L (ref 10–40)
ANION GAP SERPL CALCULATED.3IONS-SCNC: 8 MMOL/L (ref 3–16)
AST SERPL-CCNC: 150 U/L (ref 15–37)
BASOPHILS # BLD: 0.1 K/UL (ref 0–0.2)
BASOPHILS NFR BLD: 1.2 %
BILIRUB DIRECT SERPL-MCNC: 1.8 MG/DL (ref 0–0.3)
BILIRUB INDIRECT SERPL-MCNC: 1.2 MG/DL (ref 0–1)
BILIRUB SERPL-MCNC: 3 MG/DL (ref 0–1)
BUN SERPL-MCNC: 6 MG/DL (ref 7–20)
CALCIUM SERPL-MCNC: 8.8 MG/DL (ref 8.3–10.6)
CHLORIDE SERPL-SCNC: 105 MMOL/L (ref 99–110)
CO2 SERPL-SCNC: 22 MMOL/L (ref 21–32)
CREAT SERPL-MCNC: 0.6 MG/DL (ref 0.6–1.2)
DEPRECATED RDW RBC AUTO: 16.6 % (ref 12.4–15.4)
EOSINOPHIL # BLD: 0.3 K/UL (ref 0–0.6)
EOSINOPHIL NFR BLD: 3.7 %
GFR SERPLBLD CREATININE-BSD FMLA CKD-EPI: >60 ML/MIN/{1.73_M2}
GLUCOSE SERPL-MCNC: 103 MG/DL (ref 70–99)
HCT VFR BLD AUTO: 35.8 % (ref 36–48)
HGB BLD-MCNC: 11.7 G/DL (ref 12–16)
LYMPHOCYTES # BLD: 1.1 K/UL (ref 1–5.1)
LYMPHOCYTES NFR BLD: 11.6 %
MAGNESIUM SERPL-MCNC: 1.9 MG/DL (ref 1.8–2.4)
MCH RBC QN AUTO: 30.2 PG (ref 26–34)
MCHC RBC AUTO-ENTMCNC: 32.7 G/DL (ref 31–36)
MCV RBC AUTO: 92.3 FL (ref 80–100)
MONOCYTES # BLD: 0.8 K/UL (ref 0–1.3)
MONOCYTES NFR BLD: 8.4 %
NEUTROPHILS # BLD: 7 K/UL (ref 1.7–7.7)
NEUTROPHILS NFR BLD: 75.1 %
PHOSPHATE SERPL-MCNC: 2.5 MG/DL (ref 2.5–4.9)
PLATELET # BLD AUTO: 220 K/UL (ref 135–450)
PMV BLD AUTO: 10.1 FL (ref 5–10.5)
POTASSIUM SERPL-SCNC: 5 MMOL/L (ref 3.5–5.1)
PROT SERPL-MCNC: 6.4 G/DL (ref 6.4–8.2)
RBC # BLD AUTO: 3.88 M/UL (ref 4–5.2)
SODIUM SERPL-SCNC: 135 MMOL/L (ref 136–145)
WBC # BLD AUTO: 9.4 K/UL (ref 4–11)

## 2023-05-21 PROCEDURE — 6360000002 HC RX W HCPCS

## 2023-05-21 PROCEDURE — 2500000003 HC RX 250 WO HCPCS: Performed by: STUDENT IN AN ORGANIZED HEALTH CARE EDUCATION/TRAINING PROGRAM

## 2023-05-21 PROCEDURE — 85025 COMPLETE CBC W/AUTO DIFF WBC: CPT

## 2023-05-21 PROCEDURE — 6360000002 HC RX W HCPCS: Performed by: STUDENT IN AN ORGANIZED HEALTH CARE EDUCATION/TRAINING PROGRAM

## 2023-05-21 PROCEDURE — 80069 RENAL FUNCTION PANEL: CPT

## 2023-05-21 PROCEDURE — 36415 COLL VENOUS BLD VENIPUNCTURE: CPT

## 2023-05-21 PROCEDURE — 2500000003 HC RX 250 WO HCPCS: Performed by: INTERNAL MEDICINE

## 2023-05-21 PROCEDURE — 2580000003 HC RX 258: Performed by: STUDENT IN AN ORGANIZED HEALTH CARE EDUCATION/TRAINING PROGRAM

## 2023-05-21 PROCEDURE — 83735 ASSAY OF MAGNESIUM: CPT

## 2023-05-21 PROCEDURE — 80076 HEPATIC FUNCTION PANEL: CPT

## 2023-05-21 PROCEDURE — 6370000000 HC RX 637 (ALT 250 FOR IP): Performed by: STUDENT IN AN ORGANIZED HEALTH CARE EDUCATION/TRAINING PROGRAM

## 2023-05-21 PROCEDURE — C9113 INJ PANTOPRAZOLE SODIUM, VIA: HCPCS | Performed by: STUDENT IN AN ORGANIZED HEALTH CARE EDUCATION/TRAINING PROGRAM

## 2023-05-21 PROCEDURE — 94640 AIRWAY INHALATION TREATMENT: CPT

## 2023-05-21 RX ORDER — ALBUTEROL SULFATE 2.5 MG/3ML
2.5 SOLUTION RESPIRATORY (INHALATION) EVERY 6 HOURS PRN
Status: DISCONTINUED | OUTPATIENT
Start: 2023-05-21 | End: 2023-05-21 | Stop reason: HOSPADM

## 2023-05-21 RX ORDER — OXYCODONE HYDROCHLORIDE 5 MG/1
2.5 TABLET ORAL EVERY 4 HOURS PRN
Status: DISCONTINUED | OUTPATIENT
Start: 2023-05-21 | End: 2023-05-21 | Stop reason: HOSPADM

## 2023-05-21 RX ORDER — OXYCODONE HYDROCHLORIDE 5 MG/1
5 TABLET ORAL EVERY 4 HOURS PRN
Status: DISCONTINUED | OUTPATIENT
Start: 2023-05-21 | End: 2023-05-21 | Stop reason: HOSPADM

## 2023-05-21 RX ADMIN — VENLAFAXINE HYDROCHLORIDE 150 MG: 75 TABLET ORAL at 08:30

## 2023-05-21 RX ADMIN — ENOXAPARIN SODIUM 40 MG: 100 INJECTION SUBCUTANEOUS at 08:30

## 2023-05-21 RX ADMIN — BUSPIRONE HYDROCHLORIDE 5 MG: 10 TABLET ORAL at 08:31

## 2023-05-21 RX ADMIN — AMLODIPINE BESYLATE 10 MG: 10 TABLET ORAL at 08:30

## 2023-05-21 RX ADMIN — ARFORMOTEROL TARTRATE 15 MCG: 15 SOLUTION RESPIRATORY (INHALATION) at 06:52

## 2023-05-21 RX ADMIN — SODIUM CHLORIDE, PRESERVATIVE FREE 40 MG: 5 INJECTION INTRAVENOUS at 08:31

## 2023-05-21 RX ADMIN — METOPROLOL TARTRATE 5 MG: 5 INJECTION INTRAVENOUS at 04:38

## 2023-05-21 RX ADMIN — ATORVASTATIN CALCIUM 80 MG: 80 TABLET, FILM COATED ORAL at 08:30

## 2023-05-21 RX ADMIN — BUDESONIDE INHALATION 500 MCG: 0.5 SUSPENSION RESPIRATORY (INHALATION) at 06:52

## 2023-05-21 RX ADMIN — ALBUTEROL SULFATE 2.5 MG: 2.5 SOLUTION RESPIRATORY (INHALATION) at 06:52

## 2023-05-21 RX ADMIN — SODIUM CHLORIDE, PRESERVATIVE FREE 10 ML: 5 INJECTION INTRAVENOUS at 08:31

## 2023-05-21 RX ADMIN — POTASSIUM CHLORIDE, DEXTROSE MONOHYDRATE AND SODIUM CHLORIDE: 150; 5; 450 INJECTION, SOLUTION INTRAVENOUS at 02:43

## 2023-05-23 LAB — CANCER AG19-9 SERPL IA-ACNC: 2717 U/ML (ref 0–35)

## 2023-05-23 NOTE — DISCHARGE SUMMARY
Physician Discharge Summary     Patient ID:  Surya Tomlinson  [id number]  81 y.o.  1940    Admit date: 5/19/2023    Discharge date and time: 5/21/2023  9:58 AM     Admitting Physician: Donald Barragan MD     Discharge Physician: Donald Barragan MD     Admission Diagnoses: Gallstone pancreatitis [K85.10]  Pancreatic mass [K86.89]    Discharge Diagnoses: Gallstone pancreatitis [K85.10]  Pancreatic mass [K86.89]    PMH:  has a past medical history of Arthritis, CAD (coronary artery disease), COPD (chronic obstructive pulmonary disease) ([facility] [address] CVA (cerebral vascular accident) ([facility] [address] History of blood transfusion, Hx of blood clots, Hyperlipidemia, Hypertension, Lyme disease, and Pancreatic adenocarcinoma ([facility] [address] PSH:  has a past surgical history that includes back surgery; Appendectomy; shoulder surgery (Right, 02/26/2020); Shoulder arthroscopy (Right, 2/26/2020); hip surgery (Left, 12/17/2021); Colonoscopy; Endoscopy, colon, diagnostic; eye surgery; fracture surgery; Hysterectomy; joint replacement; Tonsillectomy; vascular surgery; ERCP (N/A, 5/19/2023); and Upper gastrointestinal endoscopy (N/A, 5/19/2023). Allergies: Carvedilol, Penicillins, Sulfa antibiotics, and Percocet [oxycodone-acetaminophen]    Admission Condition: fair    Discharged Condition: stable    Indication for Admission: Gallstone pancreatitis [K85.10]  Pancreatic mass CHILDRENS [facility] Course: This is a 81 y.o. female with Hx listed above presents to [facility] with vague fatigue and jaundice. CT scan with concern for a hypodense lesion measuring 2.9 x 2.8 x 2.9 cm at the head of the pancreas, she also has gallbladder is distended with a gallstone. Surgical oncology has been consulted with concerns for pancreatic neoplasm in the setting of bilirubin of 11.6, unintentional weight loss. She does not have a leukocytosis and is otherwise afebrile and HDS.    Patient had an ERCP with biliary sphincterotomy and placement of a pancreatic

## 2023-05-25 ENCOUNTER — TELEPHONE (OUTPATIENT)
Dept: SURGERY | Age: 83
End: 2023-05-25

## 2023-05-25 NOTE — TELEPHONE ENCOUNTER
MA called patient to schedule her a follow up from the hospital and there was no answer on either phone numbers that where listed, MA did leave a message on both numbers asking patient to call MA back as soon as she gets the message.

## 2025-03-21 NOTE — ED NOTES
GYNECOLOGY - WELL WOMAN VISIT      Kelin Ac is a 55 year old  postmenopausal woman who presents today as a new patient with this provider for well woman visit. She thinks that her last period was in , it was definitely before Covid. She denies vaginal bleeding. Denies abnormal vaginal discharge. She had some hot flashes and night sweats after periods stopped, but they were never bad. She might have an occasional hot flash now. She does complain of vaginal dryness and painful intercourse, like it is dry, like sandpaper. Denies postcoital bleeding. She has never taken any postmenopausal hormone therapy.     She was adopted and knows very little about her family history. She has found her biological parents and some extended family members. Her father  of a heart attack just after they made contact. She has learned that her biological mother (\"stage 0\") and aunt (\"stage 3\") had breast cancer, both in their 60s at diagnosis.       ALLERGIES:  Amoxicillin and Seasonal    Current Outpatient Medications   Medication Sig Dispense Refill    Spacer/Aero-Holding Chambers Device Use with albuterol inhaler every 4 hours as needed for shortness of breath. 2 each 0    budesonide-formoterol (Symbicort) 160-4.5 MCG/ACT inhaler Inhale 2 puffs into the lungs in the morning and 2 puffs in the evening. Can taper to 1 puff twice a day daily if doing well. 10.2 g 12    albuterol 108 (90 Base) MCG/ACT inhaler Inhale 2 puffs into the lungs every 4 hours as needed for Shortness of Breath or Wheezing. 8.5 g 1    predniSONE (DELTASONE) 10 MG tablet Take 2 tablets by mouth 2 times daily. Start in case of asthma flare. (Patient not taking: Reported on 2024) 20 tablet 0    metroNIDAZOLE (MetroGEL) 0.75 % gel Apply 1 application topically 2 times daily. 45 g 0    fluticasone (VERAMYST) 27.5 MCG/SPRAY nasal spray Spray 2 sprays in each nostril daily. 9.1 mL 11    cetirizine (ZYRTEC) 10 MG tablet Take 10 mg by mouth daily.  Writer attempted to wean pt off 2L NC, which pt is wearing post-morphine admin. Night shift RN reported to writer that morphine dropped pt BP and O2 saturation. Pt saturation dropped to high 80s on RA. Writer returned pt to 2L NC. Pt 100% NC. Will continue to monitor.        Charles De, RN  12/17/21 [id number]      MULTIVITAMINS PO CAPS 1 daily 0 0     No current facility-administered medications for this visit.       OB History    Para Term  AB Living   3 2 2 0 1 2   SAB IAB Ectopic Molar Multiple Live Births   0 1 0 0 0 2    Term  x2 in  and        GYNECOLOGIC HISTORY:   Contraception:  post menopausal  Menstrual History: postmenopausal  Pap History:  hx colposcopy , but no treatments (cryotherapy/LEEP/cone); PAPs normal since then; last PAP  negative/HPV negative   MMG History: last MMG 2025 negative, but \"lifetime risk for breast cancer 19.82%)  STD:  None      Past Medical History:   Diagnosis Date    Acromioclavicular (joint) (ligament) sprain 2004    AC SEPERATION LEFT SHOULDER DR PM    Allergic Rhinitis     Glaucoma (increased eye pressure)     Headache     ocular migraine    Ocular albinism (CMD)     since birth    Unspecified asthma(493.90)     Asthma     Past Surgical History:   Procedure Laterality Date    Strabismus surgery       SOCIAL HISTORY:  She  reports that she has never smoked. She has never used smokeless tobacco.  She  reports current alcohol use.  She  reports no history of drug use.    Family History   Adopted: Yes   Problem Relation Age of Onset    Cancer, Breast Mother 65        stage 0: calcifications, unknown if genetic tested    Myocardial Infarction Father 70        passed away from cardiac arrest in sleep    Myocardial Infarction Paternal Grandfather         In his 50s    Other Son         allergies; healthy     Other Son         allergies; psoriasis; healthy     Cancer, Breast Maternal Aunt         stage 3- unknown age, unknown if genetic tested    Other Other         PATIENT IS ADOPTED     REVIEW OF SYSTEMS:  CONSTITUTIONAL:  Denies fever/sweats and unintentional weight change.  CARDIOVASCULAR:  Denies chest discomfort with exertion, SOB or palpitations.   RESPIRATORY:  Denies cough, SOB, change in exercise tolerance.   GASTROINTESTINAL:  Denies  abdominal pain, nausea, change in bowel habits, melena.   URINARY:  Denies frequency, dysuria.  GYNECOLOGIC:  Denies abnormal vaginal bleeding, abnormal vaginal discharge, pelvic pain, and hot flashes/night sweats.  MUSCULOSKELETAL:  Denies joint pain, muscle aches.   SKIN:  Denies concerns, changing moles.   NEUROLOGIC:  Denies headache, dizziness.  PSYCHIATRIC:  Denies anxiety, depression.  HEME/LYMPHATIC:  Denies abnormal bruising or bleeding, lumps or bumps.   ENDOCRINE:  Denies heat/cold intolerances.    Physical Exam:   (note , MA present for examination)  Visit Vitals  BP (!) 142/76   Ht 5' 2\" (1.575 m)   Wt 63 kg (139 lb)   LMP 03/31/2020 (Exact Date)   BMI 25.42 kg/m²     GENERAL:  Pleasant, well nourished, well developed female.  BREASTS:  Without skin changes, masses or nipple discharge bilaterally.  ABDOMEN:  Soft and nontender without masses or hepatomegaly.  EXTREMITIES:  Nontender.  No edema.  LYMPHATIC:  No neck, supraclavicular, axillary or groin lymphadenopathy.  PELVIC EXAM:  External Genitalia:  normal.  Bartholins Glands: normal.  Skenes Glands: normal.  Urethral Meatus: normal.  Introitus: normal.  Vagina:  Atrophic changes noted but no lesions.  Cervix:  Normal, no cervical motion tenderness , and without  lesions or masses   Uterus:  normal size , mobile , non-tender, anteverted  Adnexa:  No masses , no tenderness, bilaterally       IMPRESSION/PLAN:  1) Well woman  > Normal gynecologic exam  > PAP/HPV cotesting due 2027 if following q 3 years, or 2029 if following q  years as per ASCCP recommendations  > annual screening MMG advised  > Annual GYN visits advised  > follow up / establish with new PCP for BP, colon cancer screening    2) Screening for breast cancer  > family hx breast cancer in mother and maternal aunt, both in their 60s;  recommend Genetic Counseling for possible BRCA testing  > overall lifetime risk of breast cancer 19.82% therefore recommend consultation with the [facility]; discussed that recommendation may be to just continue annual screening MMG or they may recommend other screening  > otherwise, at minimum, continue annual screening MMG    3) 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

## (undated) DEVICE — 1010 S-DRAPE TOWEL DRAPE 10/BX: Brand: STERI-DRAPE™

## (undated) DEVICE — TOTAL BASIC PK

## (undated) DEVICE — ORGANIZER MED DEV W76XL86CM PCH W25XL28CM FOR ENDOSCP TBL

## (undated) DEVICE — MANIFOLD SURG NEPTUNE WST MGMT

## (undated) DEVICE — Device

## (undated) DEVICE — SUTURE VCRL + SZ 0 L27IN ABSRB UD L36MM CT-1 1/2 CIR VCPP41D

## (undated) DEVICE — CANNULA THREADED FLEX 8.0 X 72MM: Brand: CLEAR-TRAC

## (undated) DEVICE — LIGHT HANDLE: Brand: DEVON

## (undated) DEVICE — SINGLE USE DISTAL COVER MAJ-2315: Brand: SINGLE USE DISTAL COVER

## (undated) DEVICE — STERILE LATEX POWDER-FREE SURGICAL GLOVESWITH NITRILE COATING: Brand: PROTEXIS

## (undated) DEVICE — STAPLER SKIN H3.9MM WIRE DIA0.58MM CRWN 6.9MM 35 STPL FIX

## (undated) DEVICE — Z INACTIVE NO SUPPLIER SOLUTIONIRRIG 3000ML 0.9% SOD CHL FLX CONT [79720808] [HOSPIRA WORLDWIDE INC]

## (undated) DEVICE — GLOVE ORANGE PI 7 1/2   MSG9075

## (undated) DEVICE — SUTURE FIBERWIRE SZ 2 L38IN NONABSORBABLE BLU WHT BLK AR7201

## (undated) DEVICE — DRESSING FOAM W4XL10IN SIL RECT ADH WTRPRF FLM BK W/ BORD

## (undated) DEVICE — MEDI-VAC NON-CONDUCTIVE SUCTION TUBING: Brand: CARDINAL HEALTH

## (undated) DEVICE — ENDOSCOPIC ULTRASOUND FINE NEEDLE BIOPSY (FNB) DEVICE: Brand: ACQUIRE

## (undated) DEVICE — HOOD, PEEL-AWAY: Brand: FLYTE

## (undated) DEVICE — 4.0 CM ACROMIOBLASTER STRAIGHT                                    BURRS, POWER/EP-1, SAGE GREEN, 8000                                    MAXIMUM RPM, PACKAGED 6 PER BOX, STERILE

## (undated) DEVICE — TUBE SUCT L10IN MINI FLTR CRV KAMVAC

## (undated) DEVICE — GOWN SIRUS NONREIN XL W/TWL: Brand: MEDLINE INDUSTRIES, INC.

## (undated) DEVICE — SUTURE ETHBND EXCEL SZ 2 L30IN NONABSORBABLE GRN L40MM V-37 MX69G

## (undated) DEVICE — SHOULDER STABILIZATION KIT,                                    DISPOSABLE 12 PER BOX

## (undated) DEVICE — HEWSON SUTURE RETRIEVER: Brand: HEWSON SUTURE RETRIEVER

## (undated) DEVICE — DRAPE 70X60IN SPLIT IMPERV ADHES STRIP

## (undated) DEVICE — TUBING PMP IRRIG GOFLO

## (undated) DEVICE — PILLOW POS W15XH6XL22IN RASPBERRY FOAM ABD W/ STRP DISP FOR

## (undated) DEVICE — 4.5 MM INCISOR PLUS STRAIGHT                                    BLADES, POWER/EP-1, VIOLET, PACKAGED                                    6 PER BOX, STERILE

## (undated) DEVICE — POSITIONER HD AD W4.5XH8XL9IN HIGHLY RESILIENT FOAM CMFRT

## (undated) DEVICE — STERILE PVP: Brand: MEDLINE INDUSTRIES, INC.

## (undated) DEVICE — SOLUTION IV IRRIG 500ML 0.9% SODIUM CHL 2F7123

## (undated) DEVICE — WEREWOLF FLOW 90 COBLATION WAND: Brand: COBLATION

## (undated) DEVICE — PENCIL ES CRD L10FT HND SWCHING ROCK SWCH W/ EDGE COAT BLDE

## (undated) DEVICE — 3M™ MICROFOAM™ SURGICAL TAPE 4 ROLLS/CARTON 6 CARTONS/CASE 1528-3: Brand: 3M™ MICROFOAM™

## (undated) DEVICE — SPONGE LAP W18XL18IN WHT COT 4 PLY FLD STRUNG RADPQ DISP ST

## (undated) DEVICE — SUTURE MCRYL SZ 4-0 L18IN ABSRB UD L19MM PS-2 3/8 CIR PRIM Y496G

## (undated) DEVICE — 3M™ IOBAN™ 2 ANTIMICROBIAL INCISE DRAPE 6640EZ: Brand: IOBAN™ 2

## (undated) DEVICE — PAD FELT FOR ARTHROVAC FLR SUCT SYS